# Patient Record
Sex: FEMALE | Race: OTHER | HISPANIC OR LATINO | URBAN - METROPOLITAN AREA
[De-identification: names, ages, dates, MRNs, and addresses within clinical notes are randomized per-mention and may not be internally consistent; named-entity substitution may affect disease eponyms.]

---

## 2022-04-08 ENCOUNTER — INPATIENT (INPATIENT)
Facility: HOSPITAL | Age: 77
LOS: 7 days | Discharge: HOME | DRG: 375 | End: 2022-04-16
Attending: INTERNAL MEDICINE | Admitting: INTERNAL MEDICINE
Payer: MEDICARE

## 2022-04-08 VITALS
SYSTOLIC BLOOD PRESSURE: 119 MMHG | WEIGHT: 164.02 LBS | DIASTOLIC BLOOD PRESSURE: 57 MMHG | OXYGEN SATURATION: 98 % | HEART RATE: 77 BPM | RESPIRATION RATE: 18 BRPM | TEMPERATURE: 98 F

## 2022-04-08 DIAGNOSIS — D64.9 ANEMIA, UNSPECIFIED: ICD-10-CM

## 2022-04-08 LAB
ALBUMIN SERPL ELPH-MCNC: 3.5 G/DL — SIGNIFICANT CHANGE UP (ref 3.5–5)
ALP SERPL-CCNC: 35 U/L — LOW (ref 40–120)
ALT FLD-CCNC: 31 U/L DA — SIGNIFICANT CHANGE UP (ref 10–60)
ANION GAP SERPL CALC-SCNC: 7 MMOL/L — SIGNIFICANT CHANGE UP (ref 5–17)
APTT BLD: 31 SEC — SIGNIFICANT CHANGE UP (ref 27.5–35.5)
AST SERPL-CCNC: 14 U/L — SIGNIFICANT CHANGE UP (ref 10–40)
BASOPHILS # BLD AUTO: 0.03 K/UL — SIGNIFICANT CHANGE UP (ref 0–0.2)
BASOPHILS NFR BLD AUTO: 0.5 % — SIGNIFICANT CHANGE UP (ref 0–2)
BILIRUB SERPL-MCNC: 0.2 MG/DL — SIGNIFICANT CHANGE UP (ref 0.2–1.2)
BUN SERPL-MCNC: 18 MG/DL — SIGNIFICANT CHANGE UP (ref 7–18)
CALCIUM SERPL-MCNC: 9 MG/DL — SIGNIFICANT CHANGE UP (ref 8.4–10.5)
CHLORIDE SERPL-SCNC: 103 MMOL/L — SIGNIFICANT CHANGE UP (ref 96–108)
CO2 SERPL-SCNC: 28 MMOL/L — SIGNIFICANT CHANGE UP (ref 22–31)
CREAT SERPL-MCNC: 1.53 MG/DL — HIGH (ref 0.5–1.3)
EGFR: 35 ML/MIN/1.73M2 — LOW
EOSINOPHIL # BLD AUTO: 0.06 K/UL — SIGNIFICANT CHANGE UP (ref 0–0.5)
EOSINOPHIL NFR BLD AUTO: 1 % — SIGNIFICANT CHANGE UP (ref 0–6)
GLUCOSE SERPL-MCNC: 296 MG/DL — HIGH (ref 70–99)
HCT VFR BLD CALC: 20.7 % — CRITICAL LOW (ref 34.5–45)
HGB BLD-MCNC: 6.2 G/DL — CRITICAL LOW (ref 11.5–15.5)
IMM GRANULOCYTES NFR BLD AUTO: 0.3 % — SIGNIFICANT CHANGE UP (ref 0–1.5)
INR BLD: 1.13 RATIO — SIGNIFICANT CHANGE UP (ref 0.88–1.16)
IRON SATN MFR SERPL: 108 UG/DL — SIGNIFICANT CHANGE UP (ref 40–150)
IRON SATN MFR SERPL: 32 % — SIGNIFICANT CHANGE UP (ref 15–50)
LACTATE SERPL-SCNC: 1.9 MMOL/L — SIGNIFICANT CHANGE UP (ref 0.7–2)
LIDOCAIN IGE QN: 289 U/L — SIGNIFICANT CHANGE UP (ref 73–393)
LYMPHOCYTES # BLD AUTO: 1.61 K/UL — SIGNIFICANT CHANGE UP (ref 1–3.3)
LYMPHOCYTES # BLD AUTO: 26 % — SIGNIFICANT CHANGE UP (ref 13–44)
MCHC RBC-ENTMCNC: 28.2 PG — SIGNIFICANT CHANGE UP (ref 27–34)
MCHC RBC-ENTMCNC: 30 GM/DL — LOW (ref 32–36)
MCV RBC AUTO: 94.1 FL — SIGNIFICANT CHANGE UP (ref 80–100)
MONOCYTES # BLD AUTO: 0.57 K/UL — SIGNIFICANT CHANGE UP (ref 0–0.9)
MONOCYTES NFR BLD AUTO: 9.2 % — SIGNIFICANT CHANGE UP (ref 2–14)
NEUTROPHILS # BLD AUTO: 3.91 K/UL — SIGNIFICANT CHANGE UP (ref 1.8–7.4)
NEUTROPHILS NFR BLD AUTO: 63 % — SIGNIFICANT CHANGE UP (ref 43–77)
NRBC # BLD: 0 /100 WBCS — SIGNIFICANT CHANGE UP (ref 0–0)
PLATELET # BLD AUTO: 330 K/UL — SIGNIFICANT CHANGE UP (ref 150–400)
POTASSIUM SERPL-MCNC: 4.5 MMOL/L — SIGNIFICANT CHANGE UP (ref 3.5–5.3)
POTASSIUM SERPL-SCNC: 4.5 MMOL/L — SIGNIFICANT CHANGE UP (ref 3.5–5.3)
PROT SERPL-MCNC: 6.9 G/DL — SIGNIFICANT CHANGE UP (ref 6–8.3)
PROTHROM AB SERPL-ACNC: 13.5 SEC — HIGH (ref 10.5–13.4)
RBC # BLD: 2.2 M/UL — LOW (ref 3.8–5.2)
RBC # FLD: 16.3 % — HIGH (ref 10.3–14.5)
SARS-COV-2 RNA SPEC QL NAA+PROBE: SIGNIFICANT CHANGE UP
SODIUM SERPL-SCNC: 138 MMOL/L — SIGNIFICANT CHANGE UP (ref 135–145)
TIBC SERPL-MCNC: 339 UG/DL — SIGNIFICANT CHANGE UP (ref 250–450)
UIBC SERPL-MCNC: 231 UG/DL — SIGNIFICANT CHANGE UP (ref 110–370)
WBC # BLD: 6.2 K/UL — SIGNIFICANT CHANGE UP (ref 3.8–10.5)
WBC # FLD AUTO: 6.2 K/UL — SIGNIFICANT CHANGE UP (ref 3.8–10.5)

## 2022-04-08 PROCEDURE — 99285 EMERGENCY DEPT VISIT HI MDM: CPT

## 2022-04-08 NOTE — ED ADULT NURSE NOTE - OBJECTIVE STATEMENT
Patient sent by PCP for abnormal labs. Patient reports weakness for weeks but unable to see doctor until today. NO signs active  bleeding noted.

## 2022-04-08 NOTE — ED PROVIDER NOTE - NS ED ROS FT
CONSTITUTIONAL: no fever  EYES: no eye pain   ENMT: no throat pain  CARDIOVASCULAR: no chest pain   RESPIRATORY: no shortness of breath   GASTROINTESTINAL: no abdominal pain   GENITOURINARY: no dysuria   SKIN: no rashes  NEUROLOGICAL: no headache, +weakness

## 2022-04-08 NOTE — ED PROVIDER NOTE - PHYSICAL EXAMINATION
GENERAL: well appearing, no acute distress   HEAD: atraumatic   EYES: EOMI   ENT: moist oral mucosa   CARDIAC: regular rate  RESPIRATORY: no increased work of breathing   GI: abdo soft nontender nondistended   MUSCULOSKELETAL: no deformity   NEUROLOGICAL: alert, spontaneous movement of extremities   SKIN: no visible rash  PSYCHIATRIC: cooperative

## 2022-04-08 NOTE — ED PROVIDER NOTE - OBJECTIVE STATEMENT
77 yo F pmh of thyroid disease, HTN, HLD, DM, presents from office of Dr Rollins stating Hgb in Dec 2021 11.4 and today is 6.8, requesting admission for EGD/colonsocpy. Pt has empty bottle of xarelto w/ her medications, but does not know the last time she took this or the indication for AC. c/o weakness. Denies bleeding, syncope, other acute complaints.  used.

## 2022-04-09 ENCOUNTER — TRANSCRIPTION ENCOUNTER (OUTPATIENT)
Age: 77
End: 2022-04-09

## 2022-04-09 DIAGNOSIS — I10 ESSENTIAL (PRIMARY) HYPERTENSION: ICD-10-CM

## 2022-04-09 DIAGNOSIS — I48.91 UNSPECIFIED ATRIAL FIBRILLATION: ICD-10-CM

## 2022-04-09 DIAGNOSIS — Z29.9 ENCOUNTER FOR PROPHYLACTIC MEASURES, UNSPECIFIED: ICD-10-CM

## 2022-04-09 DIAGNOSIS — I25.10 ATHEROSCLEROTIC HEART DISEASE OF NATIVE CORONARY ARTERY WITHOUT ANGINA PECTORIS: ICD-10-CM

## 2022-04-09 DIAGNOSIS — D64.9 ANEMIA, UNSPECIFIED: ICD-10-CM

## 2022-04-09 DIAGNOSIS — Z95.1 PRESENCE OF AORTOCORONARY BYPASS GRAFT: Chronic | ICD-10-CM

## 2022-04-09 DIAGNOSIS — E03.9 HYPOTHYROIDISM, UNSPECIFIED: ICD-10-CM

## 2022-04-09 DIAGNOSIS — E11.9 TYPE 2 DIABETES MELLITUS WITHOUT COMPLICATIONS: ICD-10-CM

## 2022-04-09 DIAGNOSIS — Z95.0 PRESENCE OF CARDIAC PACEMAKER: Chronic | ICD-10-CM

## 2022-04-09 DIAGNOSIS — E78.5 HYPERLIPIDEMIA, UNSPECIFIED: ICD-10-CM

## 2022-04-09 LAB
A1C WITH ESTIMATED AVERAGE GLUCOSE RESULT: 7.5 % — HIGH (ref 4–5.6)
ALBUMIN SERPL ELPH-MCNC: 3.2 G/DL — LOW (ref 3.5–5)
ALP SERPL-CCNC: 27 U/L — LOW (ref 40–120)
ALT FLD-CCNC: 30 U/L DA — SIGNIFICANT CHANGE UP (ref 10–60)
ANION GAP SERPL CALC-SCNC: 8 MMOL/L — SIGNIFICANT CHANGE UP (ref 5–17)
AST SERPL-CCNC: 17 U/L — SIGNIFICANT CHANGE UP (ref 10–40)
BASOPHILS # BLD AUTO: 0.04 K/UL — SIGNIFICANT CHANGE UP (ref 0–0.2)
BASOPHILS NFR BLD AUTO: 0.9 % — SIGNIFICANT CHANGE UP (ref 0–2)
BILIRUB SERPL-MCNC: 0.2 MG/DL — SIGNIFICANT CHANGE UP (ref 0.2–1.2)
BUN SERPL-MCNC: 16 MG/DL — SIGNIFICANT CHANGE UP (ref 7–18)
CALCIUM SERPL-MCNC: 9 MG/DL — SIGNIFICANT CHANGE UP (ref 8.4–10.5)
CHLORIDE SERPL-SCNC: 106 MMOL/L — SIGNIFICANT CHANGE UP (ref 96–108)
CHOLEST SERPL-MCNC: 108 MG/DL — SIGNIFICANT CHANGE UP
CO2 SERPL-SCNC: 27 MMOL/L — SIGNIFICANT CHANGE UP (ref 22–31)
CREAT SERPL-MCNC: 1.14 MG/DL — SIGNIFICANT CHANGE UP (ref 0.5–1.3)
DIR ANTIGLOB POLYSPECIFIC INTERPRETATION: SIGNIFICANT CHANGE UP
EGFR: 50 ML/MIN/1.73M2 — LOW
EOSINOPHIL # BLD AUTO: 0.12 K/UL — SIGNIFICANT CHANGE UP (ref 0–0.5)
EOSINOPHIL NFR BLD AUTO: 2.7 % — SIGNIFICANT CHANGE UP (ref 0–6)
ERYTHROCYTE [SEDIMENTATION RATE] IN BLOOD: 42 MM/HR — HIGH (ref 0–20)
ESTIMATED AVERAGE GLUCOSE: 169 MG/DL — HIGH (ref 68–114)
FERRITIN SERPL-MCNC: 27 NG/ML — SIGNIFICANT CHANGE UP (ref 15–150)
GLUCOSE BLDC GLUCOMTR-MCNC: 144 MG/DL — HIGH (ref 70–99)
GLUCOSE BLDC GLUCOMTR-MCNC: 259 MG/DL — HIGH (ref 70–99)
GLUCOSE BLDC GLUCOMTR-MCNC: 293 MG/DL — HIGH (ref 70–99)
GLUCOSE BLDC GLUCOMTR-MCNC: 337 MG/DL — HIGH (ref 70–99)
GLUCOSE SERPL-MCNC: 123 MG/DL — HIGH (ref 70–99)
HAPTOGLOB SERPL-MCNC: 162 MG/DL — SIGNIFICANT CHANGE UP (ref 34–200)
HCT VFR BLD CALC: 23.3 % — LOW (ref 34.5–45)
HCT VFR BLD CALC: 25.3 % — LOW (ref 34.5–45)
HCV AB S/CO SERPL IA: 0.1 S/CO — SIGNIFICANT CHANGE UP (ref 0–0.99)
HCV AB SERPL-IMP: SIGNIFICANT CHANGE UP
HDLC SERPL-MCNC: 36 MG/DL — LOW
HGB BLD-MCNC: 7.2 G/DL — LOW (ref 11.5–15.5)
HGB BLD-MCNC: 7.5 G/DL — LOW (ref 11.5–15.5)
IMM GRANULOCYTES NFR BLD AUTO: 0.2 % — SIGNIFICANT CHANGE UP (ref 0–1.5)
LDH SERPL L TO P-CCNC: 194 U/L — SIGNIFICANT CHANGE UP (ref 120–225)
LIPID PNL WITH DIRECT LDL SERPL: 48 MG/DL — SIGNIFICANT CHANGE UP
LYMPHOCYTES # BLD AUTO: 1.22 K/UL — SIGNIFICANT CHANGE UP (ref 1–3.3)
LYMPHOCYTES # BLD AUTO: 27.1 % — SIGNIFICANT CHANGE UP (ref 13–44)
MAGNESIUM SERPL-MCNC: 1.8 MG/DL — SIGNIFICANT CHANGE UP (ref 1.6–2.6)
MCHC RBC-ENTMCNC: 26.7 PG — LOW (ref 27–34)
MCHC RBC-ENTMCNC: 27.5 PG — SIGNIFICANT CHANGE UP (ref 27–34)
MCHC RBC-ENTMCNC: 29.6 GM/DL — LOW (ref 32–36)
MCHC RBC-ENTMCNC: 30.9 GM/DL — LOW (ref 32–36)
MCV RBC AUTO: 88.9 FL — SIGNIFICANT CHANGE UP (ref 80–100)
MCV RBC AUTO: 90 FL — SIGNIFICANT CHANGE UP (ref 80–100)
MONOCYTES # BLD AUTO: 0.55 K/UL — SIGNIFICANT CHANGE UP (ref 0–0.9)
MONOCYTES NFR BLD AUTO: 12.2 % — SIGNIFICANT CHANGE UP (ref 2–14)
NEUTROPHILS # BLD AUTO: 2.56 K/UL — SIGNIFICANT CHANGE UP (ref 1.8–7.4)
NEUTROPHILS NFR BLD AUTO: 56.9 % — SIGNIFICANT CHANGE UP (ref 43–77)
NON HDL CHOLESTEROL: 72 MG/DL — SIGNIFICANT CHANGE UP
NRBC # BLD: 0 /100 WBCS — SIGNIFICANT CHANGE UP (ref 0–0)
NRBC # BLD: 0 /100 WBCS — SIGNIFICANT CHANGE UP (ref 0–0)
PHOSPHATE SERPL-MCNC: 5.5 MG/DL — HIGH (ref 2.5–4.5)
PLATELET # BLD AUTO: 267 K/UL — SIGNIFICANT CHANGE UP (ref 150–400)
PLATELET # BLD AUTO: 308 K/UL — SIGNIFICANT CHANGE UP (ref 150–400)
POTASSIUM SERPL-MCNC: 4 MMOL/L — SIGNIFICANT CHANGE UP (ref 3.5–5.3)
POTASSIUM SERPL-SCNC: 4 MMOL/L — SIGNIFICANT CHANGE UP (ref 3.5–5.3)
PROT SERPL-MCNC: 6.3 G/DL — SIGNIFICANT CHANGE UP (ref 6–8.3)
RBC # BLD: 2.62 M/UL — LOW (ref 3.8–5.2)
RBC # BLD: 2.63 M/UL — LOW (ref 3.8–5.2)
RBC # BLD: 2.81 M/UL — LOW (ref 3.8–5.2)
RBC # FLD: 18.1 % — HIGH (ref 10.3–14.5)
RBC # FLD: 18.9 % — HIGH (ref 10.3–14.5)
RETICS #: 114.1 K/UL — SIGNIFICANT CHANGE UP (ref 25–125)
RETICS/RBC NFR: 4.3 % — HIGH (ref 0.5–2.5)
SODIUM SERPL-SCNC: 141 MMOL/L — SIGNIFICANT CHANGE UP (ref 135–145)
TRIGL SERPL-MCNC: 118 MG/DL — SIGNIFICANT CHANGE UP
TSH SERPL-MCNC: 3.05 UU/ML — SIGNIFICANT CHANGE UP (ref 0.34–4.82)
WBC # BLD: 4.5 K/UL — SIGNIFICANT CHANGE UP (ref 3.8–10.5)
WBC # BLD: 5.43 K/UL — SIGNIFICANT CHANGE UP (ref 3.8–10.5)
WBC # FLD AUTO: 4.5 K/UL — SIGNIFICANT CHANGE UP (ref 3.8–10.5)
WBC # FLD AUTO: 5.43 K/UL — SIGNIFICANT CHANGE UP (ref 3.8–10.5)

## 2022-04-09 RX ORDER — METOPROLOL TARTRATE 50 MG
50 TABLET ORAL
Refills: 0 | Status: DISCONTINUED | OUTPATIENT
Start: 2022-04-09 | End: 2022-04-16

## 2022-04-09 RX ORDER — GABAPENTIN 400 MG/1
100 CAPSULE ORAL THREE TIMES A DAY
Refills: 0 | Status: DISCONTINUED | OUTPATIENT
Start: 2022-04-09 | End: 2022-04-16

## 2022-04-09 RX ORDER — LEVOTHYROXINE SODIUM 125 MCG
0 TABLET ORAL
Qty: 0 | Refills: 0 | DISCHARGE

## 2022-04-09 RX ORDER — INSULIN LISPRO 100/ML
VIAL (ML) SUBCUTANEOUS
Refills: 0 | Status: DISCONTINUED | OUTPATIENT
Start: 2022-04-09 | End: 2022-04-16

## 2022-04-09 RX ORDER — LEVOTHYROXINE SODIUM 125 MCG
75 TABLET ORAL DAILY
Refills: 0 | Status: DISCONTINUED | OUTPATIENT
Start: 2022-04-09 | End: 2022-04-16

## 2022-04-09 RX ORDER — FENOFIBRATE,MICRONIZED 130 MG
0 CAPSULE ORAL
Qty: 0 | Refills: 0 | DISCHARGE

## 2022-04-09 RX ORDER — SITAGLIPTIN 50 MG/1
0 TABLET, FILM COATED ORAL
Qty: 0 | Refills: 0 | DISCHARGE

## 2022-04-09 RX ORDER — PANTOPRAZOLE SODIUM 20 MG/1
40 TABLET, DELAYED RELEASE ORAL DAILY
Refills: 0 | Status: DISCONTINUED | OUTPATIENT
Start: 2022-04-09 | End: 2022-04-16

## 2022-04-09 RX ORDER — FENOFIBRATE,MICRONIZED 130 MG
145 CAPSULE ORAL DAILY
Refills: 0 | Status: DISCONTINUED | OUTPATIENT
Start: 2022-04-09 | End: 2022-04-16

## 2022-04-09 RX ORDER — HYDRALAZINE HCL 50 MG
0 TABLET ORAL
Qty: 0 | Refills: 0 | DISCHARGE

## 2022-04-09 RX ORDER — GABAPENTIN 400 MG/1
0 CAPSULE ORAL
Qty: 0 | Refills: 0 | DISCHARGE

## 2022-04-09 RX ORDER — HYDRALAZINE HCL 50 MG
25 TABLET ORAL DAILY
Refills: 0 | Status: DISCONTINUED | OUTPATIENT
Start: 2022-04-09 | End: 2022-04-11

## 2022-04-09 RX ORDER — LOSARTAN POTASSIUM 100 MG/1
0 TABLET, FILM COATED ORAL
Qty: 0 | Refills: 0 | DISCHARGE

## 2022-04-09 RX ORDER — RIVAROXABAN 15 MG-20MG
0 KIT ORAL
Qty: 0 | Refills: 0 | DISCHARGE

## 2022-04-09 RX ORDER — ALENDRONATE SODIUM 70 MG/1
0 TABLET ORAL
Qty: 0 | Refills: 0 | DISCHARGE

## 2022-04-09 RX ORDER — ROSUVASTATIN CALCIUM 5 MG/1
0 TABLET ORAL
Qty: 0 | Refills: 0 | DISCHARGE

## 2022-04-09 RX ORDER — ATORVASTATIN CALCIUM 80 MG/1
40 TABLET, FILM COATED ORAL AT BEDTIME
Refills: 0 | Status: DISCONTINUED | OUTPATIENT
Start: 2022-04-09 | End: 2022-04-16

## 2022-04-09 RX ORDER — METOPROLOL TARTRATE 50 MG
0 TABLET ORAL
Qty: 0 | Refills: 0 | DISCHARGE

## 2022-04-09 RX ORDER — INSULIN LISPRO 100/ML
VIAL (ML) SUBCUTANEOUS AT BEDTIME
Refills: 0 | Status: DISCONTINUED | OUTPATIENT
Start: 2022-04-09 | End: 2022-04-16

## 2022-04-09 RX ADMIN — GABAPENTIN 100 MILLIGRAM(S): 400 CAPSULE ORAL at 13:58

## 2022-04-09 RX ADMIN — Medication 75 MICROGRAM(S): at 05:16

## 2022-04-09 RX ADMIN — Medication 4: at 16:26

## 2022-04-09 RX ADMIN — Medication 50 MILLIGRAM(S): at 05:16

## 2022-04-09 RX ADMIN — Medication 3: at 12:25

## 2022-04-09 RX ADMIN — ATORVASTATIN CALCIUM 40 MILLIGRAM(S): 80 TABLET, FILM COATED ORAL at 22:11

## 2022-04-09 RX ADMIN — GABAPENTIN 100 MILLIGRAM(S): 400 CAPSULE ORAL at 05:16

## 2022-04-09 RX ADMIN — Medication 25 MILLIGRAM(S): at 05:16

## 2022-04-09 RX ADMIN — GABAPENTIN 100 MILLIGRAM(S): 400 CAPSULE ORAL at 22:09

## 2022-04-09 RX ADMIN — PANTOPRAZOLE SODIUM 40 MILLIGRAM(S): 20 TABLET, DELAYED RELEASE ORAL at 13:57

## 2022-04-09 RX ADMIN — Medication 145 MILLIGRAM(S): at 13:58

## 2022-04-09 RX ADMIN — Medication 50 MILLIGRAM(S): at 18:20

## 2022-04-09 RX ADMIN — Medication 1: at 22:08

## 2022-04-09 NOTE — DISCHARGE NOTE PROVIDER - NSDCCPCAREPLAN_GEN_ALL_CORE_FT
PRINCIPAL DISCHARGE DIAGNOSIS  Diagnosis: Mass of hepatic flexure of colon  Assessment and Plan of Treatment: You presented to the hospital with anemia, you were followed by a gastroenterologist and had an endoscopy and colonoscopy on 4/14 which found a mass on your colon. A biopsy was taken and sent for pathology, you must follow up with your oncologist for the results. Christofer consulted and reccomend a colon rescection to remove the mass. Follow up with Dr Dee in 2 weeks      SECONDARY DISCHARGE DIAGNOSES  Diagnosis: Symptomatic anemia  Assessment and Plan of Treatment: You were found to have anemia, and required 1 blood transfusion. This is likely caused by the mass that was found in your colon. Follow up with your primary care doctor to have your blood work redrawn. Symptoms to report, bleeding, palpitations, fatigue, pale skin, cold skin, dizziness. Take medications as ordered by PCP      Diagnosis: Atrial fibrillation  Assessment and Plan of Treatment: Atrial fibrillation is the most common heart rhythm problem.  The condition puts you at risk for has stroke and heart attack  It helps if you control your blood pressure, not drink more than 1-2 alcohol drinks per day, cut down on caffeine, getting treatment for over active thyroid gland, and get regular exercise  Call your doctor if you feel your heart racing or beating unusually, chest tightness or pain, lightheaded, faint, shortness of breath especially with exercise  It is important to take your heart medication as prescribed  You may be on anticoagulation which is very important to take as directed - you may need blood work to monitor drug levels      Diagnosis: Diabetes mellitus  Assessment and Plan of Treatment: Your HgA1C was 7.5 this admission.  Make sure you get your HgA1c checked every three months.  If you take oral diabetes medications, check your blood glucose two times a day.  If you take insulin, check your blood glucose before meals and at bedtime.  It's important not to skip any meals.  Keep a log of your blood glucose results and always take it with you to your doctor appointments.  Keep a list of your current medications including injectables and over the counter medications and bring this medication list with you to all your doctor appointments.  If you have not seen your ophthalmologist this year call for appointment.  Check your feet daily for redness, sores, or openings. Do not self treat. If no improvement in two days call your primary care physician for an appointment.  Low blood sugar (hypoglycemia) is a blood sugar below 70mg/dl. Check your blood sugar if you feel signs/symptoms of hypoglycemia. If your blood sugar is below 70 take 15 grams of carbohydrates (ex 4 oz of apple juice, 3-4 glucose tablets, or 4-6 oz of regular soda) wait 15 minutes and repeat blood sugar to make sure it comes up above 70.  If your blood sugar is above 70 and you are due for a meal, have a meal.  If you are not due for a meal have a snack.  This snack helps keeps your blood sugar at a safe range.

## 2022-04-09 NOTE — H&P ADULT - NSHPPHYSICALEXAM_GEN_ALL_CORE
Vital Signs Last 24 Hrs  T(C): 36.6 (08 Apr 2022 23:48), Max: 37.1 (08 Apr 2022 21:00)  T(F): 97.9 (08 Apr 2022 23:48), Max: 98.7 (08 Apr 2022 21:00)  HR: 74 (08 Apr 2022 23:48) (74 - 81)  BP: 173/72 (08 Apr 2022 23:48) (119/57 - 173/72)  BP(mean): 105 (08 Apr 2022 23:48) (105 - 105)  RR: 17 (08 Apr 2022 23:48) (17 - 18)  SpO2: 96% (08 Apr 2022 23:48) (96% - 98%)    GENERAL: NAD, lying in bed comfortably  HEAD:  Atraumatic, Normocephalic  EYES: EOMI, PERRLA, conjunctiva and sclera clear  ENT: Moist mucous membranes  NECK: Supple, No JVD  CHEST/LUNG: Clear to auscultation bilaterally; No rales, rhonchi, wheezing, or rubs. Unlabored respirations  HEART: Regular rate and rhythm; No murmurs, rubs, or gallops  ABDOMEN: Bowel sounds present; Soft, Nontender, Nondistended. No hepatomegaly  EXTREMITIES:  2+ Peripheral Pulses, brisk capillary refill. No clubbing, cyanosis, or edema  NERVOUS SYSTEM:  Alert & Oriented X3, speech clear. No deficits   MSK: FROM all 4 extremities, full and equal strength  SKIN: No rashes or lesions

## 2022-04-09 NOTE — DISCHARGE NOTE PROVIDER - NSDCMRMEDTOKEN_GEN_ALL_CORE_FT
ALENDRONATE SODIUM 70MG TAB: tab(s) orally once a week  RAH LEVOTHYROXINE SODIUM 75MCG TAB: tab(s) orally once a day  FENOFIBRATE 160MG TAB: tab(s) orally once a day  GABAPENTIN 100MG CAP: cap(s) orally 3 times a day  HYDRALAZINE HYDROCHLORIDE 25MG TAB: tab(s) orally once a day  JANUVIA 100MG TAB: tab(s) orally once a day  LEVEMIR FLEXTOUCH 100U/ML PEN:   LOSARTAN POTASSIUM 50MG TAB: tab(s) orally once a day  METFORMIN HYDROCHLORIDE 1000MG TAB:   METOPROLOL SUCCINATE ER 100MG ER TAB: tab(s) orally once a day  PANTOPRAZOLE SODIUM 40MG DR TAB:   ROSUVASTATIN CALCIUM 10MG TAB: tab(s) orally once a day (at bedtime)  XARELTO 20MG TAB: tab(s) orally once a day   ALENDRONATE SODIUM 70MG TAB: tab(s) orally once a week  RAH LEVOTHYROXINE SODIUM 75MCG TAB: tab(s) orally once a day  FENOFIBRATE 160MG TAB: tab(s) orally once a day  GABAPENTIN 100MG CAP: cap(s) orally 3 times a day  HYDRALAZINE HYDROCHLORIDE 25MG TAB: tab(s) orally once a day  JANUVIA 100MG TAB: tab(s) orally once a day  LEVEMIR FLEXTOUCH 100U/ML PEN:   LOSARTAN POTASSIUM 50MG TAB: tab(s) orally once a day  METFORMIN HYDROCHLORIDE 1000MG TAB:   METOPROLOL SUCCINATE ER 100MG ER TAB: tab(s) orally once a day  PANTOPRAZOLE SODIUM 40MG DR TAB: 1 tab(s) orally once a day  ROSUVASTATIN CALCIUM 10MG TAB: tab(s) orally once a day (at bedtime)  XARELTO 20MG TAB: tab(s) orally once a day

## 2022-04-09 NOTE — H&P ADULT - ASSESSMENT
Pt is a 77 yo F from home, walks with a walker, with PMH of CAD (s/p CABG in 2016), Atrial Fibrillation (s/p PPM, on xarelto), Hypothyroidism HTN, HLD, DM, presents to the ED with complaints of fatiigue, weakness and SOB on exertion for 2 weeks. Admitted for symptomatic anemia requiring blood transfusion

## 2022-04-09 NOTE — DISCHARGE NOTE PROVIDER - HOSPITAL COURSE
Pt is a 77 yo F from home, walks with a walker, with PMH of CAD (s/p CABG in 2016), Atrial Fibrillation (s/p PPM, on xarelto), Hypothyroidism HTN, HLD, DM, presents to the ED with complaints of fatiigue, weakness and SOB on exertion for 2 weeks. H/H upon admission was 6.2/20.7. Admitted for symptomatic anemia requiring blood transfusion. Was transfused with 1 Unit of RPBC and post tarnsfusion H/H was 7.5/25.3.      >>>>>>>>>>>>>>>>>>>>>>INCOMPLETE>>>>>>>>>>>>>>>>>>>>>>>>   75 y/o female, from home, walks with a walker, with PMH of CAD (s/p CABG in 2016), Atrial Fibrillation (s/p PPM, on xarelto), Hypothyroidism HTN, HLD, DM, breast cancer s/p mastectomy   (12 years ago, not on chemo) presents to the ED with complaints of fatigue weakness and SOB on exertion for 2 weeks. Admitted to medicine for symptomatic anemia requiring 1U PRBCs.   Heme onc and GI Dr. Tapia following. Pt initially required cardiac workup for anesthesia during EGD and colonoscopy, ekg nsr and troponin negative x2. Colonoscopy revealed mass in hepatic flexure,   surgery Dr. Dee consulted. Pending pathology results from mass, will follow up with heme onc outpatient for results. CT chest and A/P showed no metastasis.   Surgery consulted and reccs outpatient colon resection, patient and family in agreement   Hospital course complicated by hyperglycemia for which dominic ray followed and insulin was adjusted   Discharge discussed with attending, this is only a brief summary of patient's hospital stay, for full course please see EMR

## 2022-04-09 NOTE — PATIENT PROFILE ADULT - FALL HARM RISK - FACTORS
Impaired gait/Impaired vision/IV and/or equipment tethered to patient/Medication side effects/Other medical problems/Poor balance/Weakness/Other

## 2022-04-09 NOTE — H&P ADULT - PROBLEM SELECTOR PLAN 1
Pt presented with fatigue, weakness and SOB on exertion for last two weeks  No h/o lidia, hematochezia, hematuria or any other sources of bleed  Was on xarelto for Afib  Vitals stable  Physical exam unremarkable  Hemoglobin was 6.2 on admission  received 1U PRBC  Repeat after transfusion, hemoglobin is 7.5  follow up FOBT  monitor CBC  GI Dr, *** consulted Pt presented with fatigue, weakness and SOB on exertion for last two weeks  No h/o lidia, hematochezia, hematuria or any other sources of bleed  Was on xarelto for Afib  Vitals stable  Physical exam unremarkable  Hemoglobin was 6.2 on admission  received 1U PRBC  Repeat after transfusion, hemoglobin is 7.5  follow up FOBT  monitor CBC AM  GI Dr, *** consulted Pt presented with fatigue, weakness and SOB on exertion for last two weeks  No h/o lidia, hematochezia, hematuria or any other sources of bleed  Was on xarelto for Afib  Vitals stable  Physical exam unremarkable  Hemoglobin was 6.2 on admission  received 1U PRBC  Repeat after transfusion, hemoglobin is 7.5  follow up FOBT  follow up LDH, fibrinogen, ESR, Direct Parmjit  monitor CBC AM  Heme/Onc Dr. Liu consulted Pt presented with fatigue, weakness and SOB on exertion for last two weeks  No h/o lidia, hematochezia, hematuria or any other sources of bleed  Was on xarelto for Afib  Vitals stable  Physical exam unremarkable  Hemoglobin was 6.2 on admission  received 1U PRBC  Repeat after transfusion, hemoglobin is 7.5  follow up FOBT  follow up LDH, fibrinogen, haptoglobin, ESR, Direct Parmjit  monitor CBC AM  transfuse if hemoglobin <7g/dl  Heme/Onc Dr. Liu consulted

## 2022-04-09 NOTE — DISCHARGE NOTE PROVIDER - PROVIDER TOKENS
PROVIDER:[TOKEN:[[id number]],FOLLOWUP:[2 weeks]],PROVIDER:[TOKEN:[[id number]],FOLLOWUP:[1 week]]

## 2022-04-09 NOTE — H&P ADULT - PROBLEM SELECTOR PLAN 2
Pt has history of atrial fibrillation   at home on xarelto   HELD Xarelto for severe anemia  started on lopressor 100mg BID with parameters

## 2022-04-09 NOTE — H&P ADULT - PROBLEM SELECTOR PLAN 4
Pt has h/o type 2 DM  at home on levemir, metformin and januvia  started on insulin HSS  follow up A1c

## 2022-04-09 NOTE — H&P ADULT - HISTORY OF PRESENT ILLNESS
Pt is a 75 yo F from home, walks with a walker, with PMH of CAD (s/p CABG in 2016), Atrial Fibrillation (s/p PPM, on xarelto), Hypothyroidism HTN, HLD, DM, presents to the ED with complaints of fatiigue, weakness and SOB on exertion for 2 weeks.     Patient was sent from office of Dr Rollins stating Hgb in Dec 2021 11.4 and today is 6.8. No c/o chest pain, fever, headache, N/V, abdominal pain, urinary complaints. No recent travel/sickness/ change in meds.   Pt is a 75 yo F from home, walks with a walker, with PMH of CAD (s/p CABG in 2016), Atrial Fibrillation (s/p PPM, on xarelto), Hypothyroidism HTN, HLD, DM, presents to the ED with complaints of fatiigue, weakness and SOB on exertion for 2 weeks. Pt said she has been feeling fatigue with her usual routine and generalized weakness wherein she is not moving around as much as before. This week she started having SOB on exertion whenever she walked couple of blocks. Today she went to her PCP Dr. Khan's office and her hemoglobin was around 5 there according to patient and was sent here. No c/o chest pain, fever, headache, N/V, abdominal pain, urinary complaints. No recent travel/sickness/ change in meds.   Pt is a 77 yo F from home, walks with a walker, with PMH of CAD (s/p CABG in 2016), Atrial Fibrillation (s/p PPM, on xarelto), Hypothyroidism HTN, HLD, DM, presents to the ED with complaints of fatiigue, weakness and SOB on exertion for 2 weeks. Pt said she has been feeling fatigue with her usual routine and generalized weakness wherein she is not moving around as much as before. This week she started having SOB on exertion whenever she walked couple of blocks. Today she went to her PCP Dr. Christopher's office and her hemoglobin was around 5 there according to patient and was sent here. No c/o chest pain, fever, headache, N/V, abdominal pain, urinary complaints. No recent travel/sickness/ change in meds.   Pt is a 75 yo F from home, walks with a walker, with PMH of CAD (s/p CABG in 2016), Atrial Fibrillation (s/p PPM, on xarelto), Hypothyroidism HTN, HLD, DM, presents to the ED with complaints of fatiigue, weakness and SOB on exertion for 2 weeks. Pt said she has been feeling fatigue with her usual routine and generalized weakness wherein she is not moving around as much as before. This week she started having SOB on exertion whenever she walked couple of blocks. Today she went to her Hematologist Dr. Christopher's office and her hemoglobin was around 5 (baseline [id number]) according to patient and was sent here. No c/o chest pain, fever, headache, N/V, abdominal pain, urinary complaints. No recent travel/sickness/ change in meds.

## 2022-04-09 NOTE — DISCHARGE NOTE PROVIDER - CARE PROVIDER_API CALL
Catrachito Zabala)  Surgery  [address] [facility], Division [place] [address]  Phone: [phone]  Fax: [phone]  Follow Up Time: 2 weeks    Terry Sandhu)  Internal Medicine  [address] [place] [address]  Phone: [phone]  Fax: [phone]  Follow Up Time: 1 week

## 2022-04-09 NOTE — H&P ADULT - NSICDXPASTMEDICALHX_GEN_ALL_CORE_FT
PAST MEDICAL HISTORY:  Atrial fibrillation and flutter     CAD (coronary artery disease)     DM (diabetes mellitus)     Hypertension     Hypothyroidism

## 2022-04-09 NOTE — PATIENT PROFILE ADULT - FALL HARM RISK - HARM RISK INTERVENTIONS
Assistance with ambulation/Assistance OOB with selected safe patient handling equipment/Communicate Risk of Fall with Harm to all staff/Discuss with provider need for PT consult/Monitor gait and stability/Provide patient with walking aids - walker, cane, crutches/Reinforce activity limits and safety measures with patient and family/Review medications for side effects contributing to fall risk/Sit up slowly, dangle for a short time, stand at bedside before walking/Tailored Fall Risk Interventions/Toileting schedule using arm’s reach rule for commode and bathroom/Visual Cue: Yellow wristband and red socks/Bed in lowest position, wheels locked, appropriate side rails in place/Call bell, personal items and telephone in reach/Instruct patient to call for assistance before getting out of bed or chair/Non-slip footwear when patient is out of bed/[place] to call system/Physically safe environment - no spills, clutter or unnecessary equipment/Purposeful Proactive Rounding/Room/bathroom lighting operational, light cord in reach

## 2022-04-10 LAB
ALBUMIN SERPL ELPH-MCNC: 3.6 G/DL — SIGNIFICANT CHANGE UP (ref 3.5–5)
ALP SERPL-CCNC: 30 U/L — LOW (ref 40–120)
ALT FLD-CCNC: 38 U/L DA — SIGNIFICANT CHANGE UP (ref 10–60)
ANION GAP SERPL CALC-SCNC: 5 MMOL/L — SIGNIFICANT CHANGE UP (ref 5–17)
AST SERPL-CCNC: 20 U/L — SIGNIFICANT CHANGE UP (ref 10–40)
BASOPHILS # BLD AUTO: 0.04 K/UL — SIGNIFICANT CHANGE UP (ref 0–0.2)
BASOPHILS NFR BLD AUTO: 0.9 % — SIGNIFICANT CHANGE UP (ref 0–2)
BILIRUB SERPL-MCNC: 0.3 MG/DL — SIGNIFICANT CHANGE UP (ref 0.2–1.2)
BUN SERPL-MCNC: 13 MG/DL — SIGNIFICANT CHANGE UP (ref 7–18)
CALCIUM SERPL-MCNC: 8.8 MG/DL — SIGNIFICANT CHANGE UP (ref 8.4–10.5)
CHLORIDE SERPL-SCNC: 104 MMOL/L — SIGNIFICANT CHANGE UP (ref 96–108)
CO2 SERPL-SCNC: 29 MMOL/L — SIGNIFICANT CHANGE UP (ref 22–31)
CREAT SERPL-MCNC: 1.18 MG/DL — SIGNIFICANT CHANGE UP (ref 0.5–1.3)
EGFR: 48 ML/MIN/1.73M2 — LOW
EOSINOPHIL # BLD AUTO: 0.12 K/UL — SIGNIFICANT CHANGE UP (ref 0–0.5)
EOSINOPHIL NFR BLD AUTO: 2.6 % — SIGNIFICANT CHANGE UP (ref 0–6)
GLUCOSE BLDC GLUCOMTR-MCNC: 202 MG/DL — HIGH (ref 70–99)
GLUCOSE BLDC GLUCOMTR-MCNC: 276 MG/DL — HIGH (ref 70–99)
GLUCOSE BLDC GLUCOMTR-MCNC: 297 MG/DL — HIGH (ref 70–99)
GLUCOSE BLDC GLUCOMTR-MCNC: 330 MG/DL — HIGH (ref 70–99)
GLUCOSE SERPL-MCNC: 254 MG/DL — HIGH (ref 70–99)
HCT VFR BLD CALC: 25.6 % — LOW (ref 34.5–45)
HGB BLD-MCNC: 7.8 G/DL — LOW (ref 11.5–15.5)
IMM GRANULOCYTES NFR BLD AUTO: 0.4 % — SIGNIFICANT CHANGE UP (ref 0–1.5)
LYMPHOCYTES # BLD AUTO: 1.37 K/UL — SIGNIFICANT CHANGE UP (ref 1–3.3)
LYMPHOCYTES # BLD AUTO: 29.8 % — SIGNIFICANT CHANGE UP (ref 13–44)
MAGNESIUM SERPL-MCNC: 1.9 MG/DL — SIGNIFICANT CHANGE UP (ref 1.6–2.6)
MCHC RBC-ENTMCNC: 27.2 PG — SIGNIFICANT CHANGE UP (ref 27–34)
MCHC RBC-ENTMCNC: 30.5 GM/DL — LOW (ref 32–36)
MCV RBC AUTO: 89.2 FL — SIGNIFICANT CHANGE UP (ref 80–100)
MONOCYTES # BLD AUTO: 0.44 K/UL — SIGNIFICANT CHANGE UP (ref 0–0.9)
MONOCYTES NFR BLD AUTO: 9.6 % — SIGNIFICANT CHANGE UP (ref 2–14)
NEUTROPHILS # BLD AUTO: 2.6 K/UL — SIGNIFICANT CHANGE UP (ref 1.8–7.4)
NEUTROPHILS NFR BLD AUTO: 56.7 % — SIGNIFICANT CHANGE UP (ref 43–77)
NRBC # BLD: 0 /100 WBCS — SIGNIFICANT CHANGE UP (ref 0–0)
PHOSPHATE SERPL-MCNC: 4.7 MG/DL — HIGH (ref 2.5–4.5)
PLATELET # BLD AUTO: 278 K/UL — SIGNIFICANT CHANGE UP (ref 150–400)
POTASSIUM SERPL-MCNC: 4.9 MMOL/L — SIGNIFICANT CHANGE UP (ref 3.5–5.3)
POTASSIUM SERPL-SCNC: 4.9 MMOL/L — SIGNIFICANT CHANGE UP (ref 3.5–5.3)
PROT SERPL-MCNC: 7 G/DL — SIGNIFICANT CHANGE UP (ref 6–8.3)
RBC # BLD: 2.87 M/UL — LOW (ref 3.8–5.2)
RBC # FLD: 19 % — HIGH (ref 10.3–14.5)
SODIUM SERPL-SCNC: 138 MMOL/L — SIGNIFICANT CHANGE UP (ref 135–145)
WBC # BLD: 4.59 K/UL — SIGNIFICANT CHANGE UP (ref 3.8–10.5)
WBC # FLD AUTO: 4.59 K/UL — SIGNIFICANT CHANGE UP (ref 3.8–10.5)

## 2022-04-10 RX ORDER — BENZOCAINE AND MENTHOL 5; 1 G/100ML; G/100ML
1 LIQUID ORAL THREE TIMES A DAY
Refills: 0 | Status: DISCONTINUED | OUTPATIENT
Start: 2022-04-10 | End: 2022-04-16

## 2022-04-10 RX ADMIN — GABAPENTIN 100 MILLIGRAM(S): 400 CAPSULE ORAL at 22:19

## 2022-04-10 RX ADMIN — Medication 3: at 07:47

## 2022-04-10 RX ADMIN — Medication 75 MICROGRAM(S): at 06:19

## 2022-04-10 RX ADMIN — GABAPENTIN 100 MILLIGRAM(S): 400 CAPSULE ORAL at 14:29

## 2022-04-10 RX ADMIN — Medication 3: at 11:58

## 2022-04-10 RX ADMIN — Medication 25 MILLIGRAM(S): at 06:20

## 2022-04-10 RX ADMIN — Medication 4: at 16:54

## 2022-04-10 RX ADMIN — Medication 50 MILLIGRAM(S): at 06:20

## 2022-04-10 RX ADMIN — PANTOPRAZOLE SODIUM 40 MILLIGRAM(S): 20 TABLET, DELAYED RELEASE ORAL at 11:58

## 2022-04-10 RX ADMIN — BENZOCAINE AND MENTHOL 1 LOZENGE: 5; 1 LIQUID ORAL at 14:30

## 2022-04-10 RX ADMIN — Medication 50 MILLIGRAM(S): at 17:31

## 2022-04-10 RX ADMIN — Medication 145 MILLIGRAM(S): at 11:59

## 2022-04-10 RX ADMIN — GABAPENTIN 100 MILLIGRAM(S): 400 CAPSULE ORAL at 06:20

## 2022-04-10 RX ADMIN — ATORVASTATIN CALCIUM 40 MILLIGRAM(S): 80 TABLET, FILM COATED ORAL at 22:19

## 2022-04-10 NOTE — PROGRESS NOTE ADULT - SUBJECTIVE AND OBJECTIVE BOX
SUBJECTIVE / OVERNIGHT EVENTS:pt seen and examined    MEDICATIONS  (STANDING):  atorvastatin 40 milliGRAM(s) Oral at bedtime  fenofibrate Tablet 145 milliGRAM(s) Oral daily  gabapentin 100 milliGRAM(s) Oral three times a day  hydrALAZINE 25 milliGRAM(s) Oral daily  insulin lispro (ADMELOG) corrective regimen sliding scale   SubCutaneous three times a day before meals  insulin lispro (ADMELOG) corrective regimen sliding scale   SubCutaneous at bedtime  levothyroxine 75 MICROGram(s) Oral daily  metoprolol tartrate 50 milliGRAM(s) Oral two times a day  pantoprazole  Injectable 40 milliGRAM(s) IV Push daily    MEDICATIONS  (PRN):  benzocaine 15 mG/menthol 3.6 mG Lozenge 1 Lozenge Oral three times a day PRN Sore Throat    T(C): 36.1 (04-10-22 @ 20:02), Max: 37.1 (04-10-22 @ 05:10)  HR: 66 (04-10-22 @ 20:02) (63 - 78)  BP: 152/68 (04-10-22 @ 20:02) (133/77 - 176/85)  RR: 16 (04-10-22 @ 20:02) (16 - 18)  SpO2: 97% (04-10-22 @ 20:02) (94% - 97%)    CAPILLARY BLOOD GLUCOSE      POCT Blood Glucose.: 202 mg/dL (10 Apr 2022 21:08)  POCT Blood Glucose.: 330 mg/dL (10 Apr 2022 16:27)  POCT Blood Glucose.: 297 mg/dL (10 Apr 2022 11:33)  POCT Blood Glucose.: 276 mg/dL (10 Apr 2022 07:40)    I&O's Summary      Constitutional: No fever, fatigue  Skin: No rash.  Eyes: No recent vision problems or eye pain.  ENT: No congestion, ear pain, or sore throat.  Cardiovascular: No chest pain or palpation.  Respiratory: No cough, shortness of breath, congestion, or wheezing.  Gastrointestinal: No abdominal pain, nausea, vomiting, or diarrhea.  Genitourinary: No dysuria.  Musculoskeletal: No joint swelling.  Neurologic: No headache.    PHYSICAL EXAM:  GENERAL: NAD  EYES: EOMI, PERRLA  NECK: Supple, No JVD  CHEST/LUNG: dec breath sounds rt base  HEART:  S1 , S2 +  ABDOMEN: soft, bs+  EXTREMITIES:  no edema  NEUROLOGY:alert awake      LABS:                        7.8    4.59  )-----------( 278      ( 10 Apr 2022 06:47 )             25.6     04-10    138  |  104  |  13  ----------------------------<  254<H>  4.9   |  29  |  1.18    Ca    8.8      10 Apr 2022 06:47  Phos  4.7     04-10  Mg     1.9     04-10    TPro  7.0  /  Alb  3.6  /  TBili  0.3  /  DBili  x   /  AST  20  /  ALT  38  /  AlkPhos  30<L>  04-10              RADIOLOGY & ADDITIONAL TESTS:    Imaging Personally Reviewed:    Consultant(s) Notes Reviewed:      Care Discussed with Consultants/Other Providers:

## 2022-04-10 NOTE — PROGRESS NOTE ADULT - PROBLEM SELECTOR PLAN 1
Pt presented with fatigue, weakness and SOB on exertion for last two weeks  No h/o lidia, hematochezia, hematuria or any other sources of bleed  Was on xarelto for Afib  Vitals stable  Physical exam unremarkable  Hemoglobin was 6.2 on admission  received 1U PRBC  Repeat after transfusion, hemoglobin is 7.5  follow up FOBT  follow up LDH, fibrinogen, haptoglobin, ESR, Direct Parmjit  monitor CBC AM  transfuse if hemoglobin <7g/dl  hem f/u

## 2022-04-10 NOTE — PROGRESS NOTE ADULT - ASSESSMENT
Pt is a 75 yo F from home, walks with a walker, with PMH of CAD (s/p CABG in 2016), Atrial Fibrillation (s/p PPM, on xarelto), Hypothyroidism HTN, HLD, DM, presents to the ED with complaints of fatiigue, weakness and SOB on exertion for 2 weeks. Admitted for symptomatic anemia requiring blood transfusion

## 2022-04-11 DIAGNOSIS — Z02.9 ENCOUNTER FOR ADMINISTRATIVE EXAMINATIONS, UNSPECIFIED: ICD-10-CM

## 2022-04-11 LAB
ALBUMIN SERPL ELPH-MCNC: 3.4 G/DL — LOW (ref 3.5–5)
ALP SERPL-CCNC: 33 U/L — LOW (ref 40–120)
ALT FLD-CCNC: 34 U/L DA — SIGNIFICANT CHANGE UP (ref 10–60)
ANION GAP SERPL CALC-SCNC: 7 MMOL/L — SIGNIFICANT CHANGE UP (ref 5–17)
AST SERPL-CCNC: 15 U/L — SIGNIFICANT CHANGE UP (ref 10–40)
BASOPHILS # BLD AUTO: 0.03 K/UL — SIGNIFICANT CHANGE UP (ref 0–0.2)
BASOPHILS NFR BLD AUTO: 0.8 % — SIGNIFICANT CHANGE UP (ref 0–2)
BILIRUB SERPL-MCNC: 0.3 MG/DL — SIGNIFICANT CHANGE UP (ref 0.2–1.2)
BUN SERPL-MCNC: 10 MG/DL — SIGNIFICANT CHANGE UP (ref 7–18)
CALCIUM SERPL-MCNC: 9.1 MG/DL — SIGNIFICANT CHANGE UP (ref 8.4–10.5)
CHLORIDE SERPL-SCNC: 106 MMOL/L — SIGNIFICANT CHANGE UP (ref 96–108)
CO2 SERPL-SCNC: 26 MMOL/L — SIGNIFICANT CHANGE UP (ref 22–31)
CREAT SERPL-MCNC: 1.05 MG/DL — SIGNIFICANT CHANGE UP (ref 0.5–1.3)
EGFR: 55 ML/MIN/1.73M2 — LOW
EOSINOPHIL # BLD AUTO: 0.11 K/UL — SIGNIFICANT CHANGE UP (ref 0–0.5)
EOSINOPHIL NFR BLD AUTO: 3.1 % — SIGNIFICANT CHANGE UP (ref 0–6)
GLUCOSE BLDC GLUCOMTR-MCNC: 232 MG/DL — HIGH (ref 70–99)
GLUCOSE BLDC GLUCOMTR-MCNC: 264 MG/DL — HIGH (ref 70–99)
GLUCOSE BLDC GLUCOMTR-MCNC: 387 MG/DL — HIGH (ref 70–99)
GLUCOSE BLDC GLUCOMTR-MCNC: 476 MG/DL — CRITICAL HIGH (ref 70–99)
GLUCOSE SERPL-MCNC: 248 MG/DL — HIGH (ref 70–99)
HCT VFR BLD CALC: 25.4 % — LOW (ref 34.5–45)
HGB BLD-MCNC: 7.9 G/DL — LOW (ref 11.5–15.5)
IMM GRANULOCYTES NFR BLD AUTO: 0.6 % — SIGNIFICANT CHANGE UP (ref 0–1.5)
LYMPHOCYTES # BLD AUTO: 0.96 K/UL — LOW (ref 1–3.3)
LYMPHOCYTES # BLD AUTO: 27.2 % — SIGNIFICANT CHANGE UP (ref 13–44)
MAGNESIUM SERPL-MCNC: 1.7 MG/DL — SIGNIFICANT CHANGE UP (ref 1.6–2.6)
MCHC RBC-ENTMCNC: 27.8 PG — SIGNIFICANT CHANGE UP (ref 27–34)
MCHC RBC-ENTMCNC: 31.1 GM/DL — LOW (ref 32–36)
MCV RBC AUTO: 89.4 FL — SIGNIFICANT CHANGE UP (ref 80–100)
MONOCYTES # BLD AUTO: 0.32 K/UL — SIGNIFICANT CHANGE UP (ref 0–0.9)
MONOCYTES NFR BLD AUTO: 9.1 % — SIGNIFICANT CHANGE UP (ref 2–14)
NEUTROPHILS # BLD AUTO: 2.09 K/UL — SIGNIFICANT CHANGE UP (ref 1.8–7.4)
NEUTROPHILS NFR BLD AUTO: 59.2 % — SIGNIFICANT CHANGE UP (ref 43–77)
NRBC # BLD: 0 /100 WBCS — SIGNIFICANT CHANGE UP (ref 0–0)
PHOSPHATE SERPL-MCNC: 4.6 MG/DL — HIGH (ref 2.5–4.5)
PLATELET # BLD AUTO: 266 K/UL — SIGNIFICANT CHANGE UP (ref 150–400)
POTASSIUM SERPL-MCNC: 4.4 MMOL/L — SIGNIFICANT CHANGE UP (ref 3.5–5.3)
POTASSIUM SERPL-SCNC: 4.4 MMOL/L — SIGNIFICANT CHANGE UP (ref 3.5–5.3)
PROT SERPL-MCNC: 6.8 G/DL — SIGNIFICANT CHANGE UP (ref 6–8.3)
RBC # BLD: 2.84 M/UL — LOW (ref 3.8–5.2)
RBC # FLD: 17.9 % — HIGH (ref 10.3–14.5)
SODIUM SERPL-SCNC: 139 MMOL/L — SIGNIFICANT CHANGE UP (ref 135–145)
WBC # BLD: 3.53 K/UL — LOW (ref 3.8–10.5)
WBC # FLD AUTO: 3.53 K/UL — LOW (ref 3.8–10.5)

## 2022-04-11 RX ORDER — IRON SUCROSE 20 MG/ML
200 INJECTION, SOLUTION INTRAVENOUS EVERY 24 HOURS
Refills: 0 | Status: DISCONTINUED | OUTPATIENT
Start: 2022-04-11 | End: 2022-04-11

## 2022-04-11 RX ORDER — HYDRALAZINE HCL 50 MG
50 TABLET ORAL
Refills: 0 | Status: DISCONTINUED | OUTPATIENT
Start: 2022-04-11 | End: 2022-04-12

## 2022-04-11 RX ADMIN — Medication 2: at 17:04

## 2022-04-11 RX ADMIN — GABAPENTIN 100 MILLIGRAM(S): 400 CAPSULE ORAL at 13:37

## 2022-04-11 RX ADMIN — Medication 3: at 08:16

## 2022-04-11 RX ADMIN — Medication 5: at 11:38

## 2022-04-11 RX ADMIN — Medication 4: at 22:41

## 2022-04-11 RX ADMIN — PANTOPRAZOLE SODIUM 40 MILLIGRAM(S): 20 TABLET, DELAYED RELEASE ORAL at 11:39

## 2022-04-11 RX ADMIN — Medication 50 MILLIGRAM(S): at 17:10

## 2022-04-11 RX ADMIN — Medication 50 MILLIGRAM(S): at 05:26

## 2022-04-11 RX ADMIN — GABAPENTIN 100 MILLIGRAM(S): 400 CAPSULE ORAL at 05:25

## 2022-04-11 RX ADMIN — Medication 145 MILLIGRAM(S): at 11:37

## 2022-04-11 RX ADMIN — GABAPENTIN 100 MILLIGRAM(S): 400 CAPSULE ORAL at 22:42

## 2022-04-11 RX ADMIN — ATORVASTATIN CALCIUM 40 MILLIGRAM(S): 80 TABLET, FILM COATED ORAL at 22:42

## 2022-04-11 RX ADMIN — Medication 25 MILLIGRAM(S): at 05:26

## 2022-04-11 RX ADMIN — Medication 75 MICROGRAM(S): at 05:26

## 2022-04-11 NOTE — PROGRESS NOTE ADULT - SUBJECTIVE AND OBJECTIVE BOX
CHIEF COMPLAINT  Anemia    HISTORY OF PRESENT ILLNESS  MALISSA CHAPARRO is a 76y Female who presents with a chief complaint of anemia.    No acute events. No complaints.    REVIEW OF SYSTEMS  A complete review of systems was performed; negative except per HPI    PHYSICAL EXAM  T(C): 36.8 (04-11-22 @ 05:15), Max: 36.8 (04-11-22 @ 05:15)  HR: 70 (04-11-22 @ 06:59) (64 - 78)  BP: 166/68 (04-11-22 @ 06:59) (133/77 - 187/60)  RR: 18 (04-11-22 @ 05:15) (16 - 18)  SpO2: 96% (04-11-22 @ 05:15) (96% - 97%)  Constitutional: alert, awake, in no acute distress  Eyes: PERRL, EOMI  HEENT: normocephalic, atraumatic  Neck: supple, non-tender  Cardiovascular: normal perfusion, no peripheral edema  Respiratory: normal respiratory efforts; no increased use of accessory muscles  Gastrointestinal: soft, non-tender  Musculoskeletal: normal range of motion, no deformities noted  Neurological: alert, CN II to XI grossly intact  Skin: warm, dry    LABORATORY DATA                        7.9    3.53  )-----------( 266      ( 11 Apr 2022 07:14 )             25.4     04-11    139  |  106  |  10  ----------------------------<  248<H>  4.4   |  26  |  1.05    Ca    9.1      11 Apr 2022 07:14  Phos  4.6     04-11  Mg     1.7     04-11    TPro  6.8  /  Alb  3.4<L>  /  TBili  0.3  /  DBili  x   /  AST  15  /  ALT  34  /  AlkPhos  33<L>  04-11

## 2022-04-11 NOTE — PROGRESS NOTE ADULT - PROBLEM SELECTOR PLAN 6
Pt has h/o hypothyroidism  started on home med of levothyroxine 75mcg  follow up TSH Pt has h/o hypothyroidism  continue home dose levothyroxine 75mcg

## 2022-04-11 NOTE — PROGRESS NOTE ADULT - ASSESSMENT
Pt is a 75 yo F from home, walks with a walker, with PMH of CAD (s/p CABG in 2016), Atrial Fibrillation (s/p PPM, on xarelto), Hypothyroidism HTN, HLD, DM, presents to the ED with complaints of fatiigue, weakness and SOB on exertion for 2 weeks. Admitted for symptomatic anemia requiring blood transfusion Pt is a 75 yo F from home, walks with a walker, with PMH of CAD (s/p CABG in 2016), Atrial Fibrillation (s/p PPM, on xarelto), Hypothyroidism HTN, HLD, DM, presents to the ED with complaints of fatigue weakness and SOB on exertion for 2 weeks. Admitted to medicine for symptomatic anemia requiring 1U PRBCs. Hemeonc and GI consulted   Holding all A/C pending GI reccs. Will start Iron IV and trend CBC

## 2022-04-11 NOTE — PROGRESS NOTE ADULT - SUBJECTIVE AND OBJECTIVE BOX
-*-*INCOMPLETE  NP Note discussed with  primary attending    Patient is a 76y old  Female who presents with a chief complaint of Symptomatic anemia (11 Apr 2022 13:38)      INTERVAL HPI/OVERNIGHT EVENTS: HTN -180    MEDICATIONS  (STANDING):  atorvastatin 40 milliGRAM(s) Oral at bedtime  fenofibrate Tablet 145 milliGRAM(s) Oral daily  gabapentin 100 milliGRAM(s) Oral three times a day  hydrALAZINE 25 milliGRAM(s) Oral daily  insulin lispro (ADMELOG) corrective regimen sliding scale   SubCutaneous three times a day before meals  insulin lispro (ADMELOG) corrective regimen sliding scale   SubCutaneous at bedtime  levothyroxine 75 MICROGram(s) Oral daily  metoprolol tartrate 50 milliGRAM(s) Oral two times a day  pantoprazole  Injectable 40 milliGRAM(s) IV Push daily    MEDICATIONS  (PRN):  benzocaine 15 mG/menthol 3.6 mG Lozenge 1 Lozenge Oral three times a day PRN Sore Throat      __________________________________________________  REVIEW OF SYSTEMS:    CONSTITUTIONAL: No fever,   EYES: no acute visual disturbances  NECK: No pain or stiffness  RESPIRATORY: No cough; No shortness of breath  CARDIOVASCULAR: No chest pain, no palpitations  GASTROINTESTINAL: No pain. No nausea or vomiting; No diarrhea   NEUROLOGICAL: No headache or numbness, no tremors  MUSCULOSKELETAL: No joint pain, no muscle pain  GENITOURINARY: no dysuria, no frequency, no hesitancy  PSYCHIATRY: no depression , no anxiety  ALL OTHER  ROS negative        Vital Signs Last 24 Hrs  T(C): 36.8 (11 Apr 2022 05:15), Max: 36.8 (11 Apr 2022 05:15)  T(F): 98.2 (11 Apr 2022 05:15), Max: 98.2 (11 Apr 2022 05:15)  HR: 70 (11 Apr 2022 06:59) (65 - 78)  BP: 166/68 (11 Apr 2022 06:59) (152/68 - 187/60)  BP(mean): --  RR: 18 (11 Apr 2022 05:15) (16 - 18)  SpO2: 96% (11 Apr 2022 05:15) (96% - 97%)    ________________________________________________  PHYSICAL EXAM:  GENERAL: NAD  HEENT: Normocephalic;  conjunctivae and sclerae clear  NECK : supple  CHEST/LUNG: Clear to auscultation bilaterally with good air entry   HEART: S1 S2  regular; no murmurs, gallops or rubs  ABDOMEN: Soft, Nontender, Nondistended; Bowel sounds present  EXTREMITIES: no cyanosis; no edema; no calf tenderness  SKIN: warm and dry; no rash  NERVOUS SYSTEM:  Awake and alert; Oriented  to place, person and time  _________________________________________________  LABS:                        7.9    3.53  )-----------( 266      ( 11 Apr 2022 07:14 )             25.4     04-11    139  |  106  |  10  ----------------------------<  248<H>  4.4   |  26  |  1.05    Ca    9.1      11 Apr 2022 07:14  Phos  4.6     04-11  Mg     1.7     04-11    TPro  6.8  /  Alb  3.4<L>  /  TBili  0.3  /  DBili  x   /  AST  15  /  ALT  34  /  AlkPhos  33<L>  04-11        CAPILLARY BLOOD GLUCOSE      POCT Blood Glucose.: 387 mg/dL (11 Apr 2022 11:13)  POCT Blood Glucose.: 264 mg/dL (11 Apr 2022 07:42)  POCT Blood Glucose.: 202 mg/dL (10 Apr 2022 21:08)  POCT Blood Glucose.: 330 mg/dL (10 Apr 2022 16:27)      Imaging Personally Reviewed:  YES    Consultant(s) Notes Reviewed:   YES      Plan of care was discussed with patient and /or primary care giver; all questions and concerns were addressed and care was aligned with patient's wishes.

## 2022-04-11 NOTE — PROGRESS NOTE ADULT - PROBLEM SELECTOR PLAN 4
Pt has h/o type 2 DM  at home on levemir, metformin and januvia  started on insulin HSS  follow up A1c A1C 7.5   at home on levemir, metformin and januvia  continue insulin HSS  monitor bs

## 2022-04-11 NOTE — PROGRESS NOTE ADULT - ASSESSMENT
MALISSA CHAPARRO is a 76y Female who presents with a chief complaint of anemia.    Anemia  - Patient presented with severe anemia. Likely multifactorial.  - Ferritin is low at 27 indicating iron deficiency. Recommend IV iron sucrose 200 mg for 3 days.  - Evaluate for GI bleed with FOBT. Recommend gastroenterology consultation for possible endoscopy/colonoscopy.  - Monitor CBC. Transfuse to maintain hemoglobin goal of 8 given history of coronary artery disease.    Atrial Fibrillation  - Continue to hold rivaroxaban given concern for acute gastrointestinal bleeding.    Will continue to follow.    Terry Sandhu MD  Hematology/Oncology  C: [phone]  O: [phone] MALISSA CHAPARRO is a 76y Female who presents with a chief complaint of anemia.    Anemia  - Patient presented with severe anemia. Likely multifactorial.  - Ferritin is low at 27 indicating iron deficiency. Recommend IV iron sucrose 200 mg for 3 days.  - Evaluate for GI bleed with FOBT. Recommend gastroenterology consultation for possible endoscopy/colonoscopy.  - Monitor CBC. Transfuse to maintain hemoglobin goal of 8 given history of coronary artery disease.    Atrial Fibrillation  - Continue to hold rivaroxaban given concern for acute gastrointestinal bleeding.    Will continue to follow.    Terry Sandhu MD  Hematology/Oncology  O: [phone]

## 2022-04-11 NOTE — PROGRESS NOTE ADULT - PROBLEM SELECTOR PLAN 2
Pt has history of atrial fibrillation   at home on xarelto   HELD Xarelto   started on lopressor 100mg BID with parameters

## 2022-04-11 NOTE — PROGRESS NOTE ADULT - ASSESSMENT
Pt is a 77 yo F from home, walks with a walker, with PMH of CAD (s/p CABG in 2016), Atrial Fibrillation (s/p PPM, on xarelto), Hypothyroidism HTN, HLD, DM, presents to the ED with complaints of fatigue weakness and SOB on exertion for 2 weeks. Admitted to medicine for symptomatic anemia requiring 1U PRBCs. Hemeonc and GI consulted   Holding all A/C pending GI reccs. Will start Iron IV and trend CBC

## 2022-04-11 NOTE — PROGRESS NOTE ADULT - PROBLEM SELECTOR PLAN 1
normochromic anemia   No h/o melena, hematochezia, hematuria or any other sources of bleed  Was on xarelto for Afib- will hold  s/p 1U PRBC  follow up FOBT  follow up LDH, fibrinogen, haptoglobin, ESR, Direct Parmjit  monitor CBC AM  transfuse if hemoglobin <7g/dl  heme f/u

## 2022-04-11 NOTE — PROGRESS NOTE ADULT - SUBJECTIVE AND OBJECTIVE BOX
Patient is a 76y old  Female who presents with a chief complaint of Symptomatic anemia (11 Apr 2022 13:38)      INTERVAL HPI/OVERNIGHT EVENTS: HTN -180    MEDICATIONS  (STANDING):  atorvastatin 40 milliGRAM(s) Oral at bedtime  fenofibrate Tablet 145 milliGRAM(s) Oral daily  gabapentin 100 milliGRAM(s) Oral three times a day  hydrALAZINE 25 milliGRAM(s) Oral daily  insulin lispro (ADMELOG) corrective regimen sliding scale   SubCutaneous three times a day before meals  insulin lispro (ADMELOG) corrective regimen sliding scale   SubCutaneous at bedtime  levothyroxine 75 MICROGram(s) Oral daily  metoprolol tartrate 50 milliGRAM(s) Oral two times a day  pantoprazole  Injectable 40 milliGRAM(s) IV Push daily    MEDICATIONS  (PRN):  benzocaine 15 mG/menthol 3.6 mG Lozenge 1 Lozenge Oral three times a day PRN Sore Throat      __________________________________________________  REVIEW OF SYSTEMS:    CONSTITUTIONAL: No fever,   EYES: no acute visual disturbances  NECK: No pain or stiffness  RESPIRATORY: No cough; No shortness of breath  CARDIOVASCULAR: No chest pain, no palpitations  GASTROINTESTINAL: No pain. No nausea or vomiting; No diarrhea   NEUROLOGICAL: No headache or numbness, no tremors  MUSCULOSKELETAL: No joint pain, no muscle pain  GENITOURINARY: no dysuria, no frequency, no hesitancy  PSYCHIATRY: no depression , no anxiety  ALL OTHER  ROS negative        Vital Signs Last 24 Hrs  T(C): 36.8 (11 Apr 2022 05:15), Max: 36.8 (11 Apr 2022 05:15)  T(F): 98.2 (11 Apr 2022 05:15), Max: 98.2 (11 Apr 2022 05:15)  HR: 70 (11 Apr 2022 06:59) (65 - 78)  BP: 166/68 (11 Apr 2022 06:59) (152/68 - 187/60)  BP(mean): --  RR: 18 (11 Apr 2022 05:15) (16 - 18)  SpO2: 96% (11 Apr 2022 05:15) (96% - 97%)    ________________________________________________  PHYSICAL EXAM:  GENERAL: NAD  HEENT: Normocephalic;  conjunctivae and sclerae clear  NECK : supple  CHEST/LUNG: Clear to auscultation bilaterally with good air entry   HEART: S1 S2  regular; no murmurs, gallops or rubs  ABDOMEN: Soft, Nontender, Nondistended; Bowel sounds present  EXTREMITIES: no cyanosis; no edema; no calf tenderness  SKIN: warm and dry; no rash  NERVOUS SYSTEM:  Awake and alert; Oriented  to place, person and time  _________________________________________________  LABS:                        7.9    3.53  )-----------( 266      ( 11 Apr 2022 07:14 )             25.4     04-11    139  |  106  |  10  ----------------------------<  248<H>  4.4   |  26  |  1.05    Ca    9.1      11 Apr 2022 07:14  Phos  4.6     04-11  Mg     1.7     04-11    TPro  6.8  /  Alb  3.4<L>  /  TBili  0.3  /  DBili  x   /  AST  15  /  ALT  34  /  AlkPhos  33<L>  04-11        CAPILLARY BLOOD GLUCOSE      POCT Blood Glucose.: 387 mg/dL (11 Apr 2022 11:13)  POCT Blood Glucose.: 264 mg/dL (11 Apr 2022 07:42)  POCT Blood Glucose.: 202 mg/dL (10 Apr 2022 21:08)  POCT Blood Glucose.: 330 mg/dL (10 Apr 2022 16:27)      Imaging Personally Reviewed:  YES    Consultant(s) Notes Reviewed:   YES      Plan of care was discussed with patient and /or primary care giver; all questions and concerns were addressed and care was aligned with patient's wishes.

## 2022-04-11 NOTE — PROGRESS NOTE ADULT - PROBLEM SELECTOR PLAN 2
Pt has history of atrial fibrillation   at home on xarelto   HELD Xarelto for severe anemia  started on lopressor 100mg BID with parameters Pt has history of atrial fibrillation   at home on xarelto   HELD Xarelto   started on lopressor 100mg BID with parameters

## 2022-04-11 NOTE — PROGRESS NOTE ADULT - PROBLEM SELECTOR PLAN 1
Pt presented with fatigue, weakness and SOB on exertion for last two weeks  No h/o lidia, hematochezia, hematuria or any other sources of bleed  Was on xarelto for Afib  Vitals stable  Physical exam unremarkable  Hemoglobin was 6.2 on admission  received 1U PRBC  Repeat after transfusion, hemoglobin is 7.5  follow up FOBT  follow up LDH, fibrinogen, haptoglobin, ESR, Direct Parmjit  monitor CBC AM  transfuse if hemoglobin <7g/dl  Heme/Onc Dr. Liu consulted normochromic anemia   No h/o melena, hematochezia, hematuria or any other sources of bleed  Was on xarelto for Afib- will hold  s/p 1U PRBC  follow up FOBT  follow up LDH, fibrinogen, haptoglobin, ESR, Direct Parmjit  monitor CBC AM  transfuse if hemoglobin <7g/dl  Heme/Onc Dr. Liu follows

## 2022-04-11 NOTE — PROGRESS NOTE ADULT - PROBLEM SELECTOR PLAN 3
Pt has h/o HTN   started on hydralazine and lopressor with parameters uncontrolled  's - 180's   hydralazine increased  continue lopressor with parameters  monitor BP

## 2022-04-11 NOTE — PROGRESS NOTE ADULT - PROBLEM SELECTOR PLAN 5
Pt has HLD  started on home meds of atorvastatin and fenofibrate   follow up lipid profile Pt has HLD  started on home meds of atorvastatin and fenofibrate

## 2022-04-12 LAB
ANION GAP SERPL CALC-SCNC: 9 MMOL/L — SIGNIFICANT CHANGE UP (ref 5–17)
BUN SERPL-MCNC: 17 MG/DL — SIGNIFICANT CHANGE UP (ref 7–18)
CALCIUM SERPL-MCNC: 9.2 MG/DL — SIGNIFICANT CHANGE UP (ref 8.4–10.5)
CHLORIDE SERPL-SCNC: 105 MMOL/L — SIGNIFICANT CHANGE UP (ref 96–108)
CO2 SERPL-SCNC: 25 MMOL/L — SIGNIFICANT CHANGE UP (ref 22–31)
CREAT SERPL-MCNC: 1.23 MG/DL — SIGNIFICANT CHANGE UP (ref 0.5–1.3)
EGFR: 46 ML/MIN/1.73M2 — LOW
FIBRINOGEN AG PPP IA-MCNC: 485 MG/DL — HIGH (ref 180–350)
GLUCOSE BLDC GLUCOMTR-MCNC: 259 MG/DL — HIGH (ref 70–99)
GLUCOSE BLDC GLUCOMTR-MCNC: 269 MG/DL — HIGH (ref 70–99)
GLUCOSE BLDC GLUCOMTR-MCNC: 304 MG/DL — HIGH (ref 70–99)
GLUCOSE BLDC GLUCOMTR-MCNC: 371 MG/DL — HIGH (ref 70–99)
GLUCOSE SERPL-MCNC: 274 MG/DL — HIGH (ref 70–99)
HCT VFR BLD CALC: 26 % — LOW (ref 34.5–45)
HGB BLD-MCNC: 8.2 G/DL — LOW (ref 11.5–15.5)
MCHC RBC-ENTMCNC: 27.8 PG — SIGNIFICANT CHANGE UP (ref 27–34)
MCHC RBC-ENTMCNC: 31.5 GM/DL — LOW (ref 32–36)
MCV RBC AUTO: 88.1 FL — SIGNIFICANT CHANGE UP (ref 80–100)
NRBC # BLD: 0 /100 WBCS — SIGNIFICANT CHANGE UP (ref 0–0)
PLATELET # BLD AUTO: 285 K/UL — SIGNIFICANT CHANGE UP (ref 150–400)
POTASSIUM SERPL-MCNC: 4.5 MMOL/L — SIGNIFICANT CHANGE UP (ref 3.5–5.3)
POTASSIUM SERPL-SCNC: 4.5 MMOL/L — SIGNIFICANT CHANGE UP (ref 3.5–5.3)
RBC # BLD: 2.95 M/UL — LOW (ref 3.8–5.2)
RBC # FLD: 17.1 % — HIGH (ref 10.3–14.5)
SODIUM SERPL-SCNC: 139 MMOL/L — SIGNIFICANT CHANGE UP (ref 135–145)
WBC # BLD: 3.94 K/UL — SIGNIFICANT CHANGE UP (ref 3.8–10.5)
WBC # FLD AUTO: 3.94 K/UL — SIGNIFICANT CHANGE UP (ref 3.8–10.5)

## 2022-04-12 RX ORDER — SOD SULF/SODIUM/NAHCO3/KCL/PEG
4000 SOLUTION, RECONSTITUTED, ORAL ORAL ONCE
Refills: 0 | Status: COMPLETED | OUTPATIENT
Start: 2022-04-12 | End: 2022-04-12

## 2022-04-12 RX ORDER — IOHEXOL 300 MG/ML
30 INJECTION, SOLUTION INTRAVENOUS ONCE
Refills: 0 | Status: DISCONTINUED | OUTPATIENT
Start: 2022-04-12 | End: 2022-04-14

## 2022-04-12 RX ORDER — HYDRALAZINE HCL 50 MG
50 TABLET ORAL THREE TIMES A DAY
Refills: 0 | Status: DISCONTINUED | OUTPATIENT
Start: 2022-04-12 | End: 2022-04-16

## 2022-04-12 RX ADMIN — Medication 3: at 16:50

## 2022-04-12 RX ADMIN — Medication 5: at 11:52

## 2022-04-12 RX ADMIN — Medication 1: at 22:09

## 2022-04-12 RX ADMIN — Medication 50 MILLIGRAM(S): at 18:07

## 2022-04-12 RX ADMIN — Medication 50 MILLIGRAM(S): at 14:04

## 2022-04-12 RX ADMIN — PANTOPRAZOLE SODIUM 40 MILLIGRAM(S): 20 TABLET, DELAYED RELEASE ORAL at 11:53

## 2022-04-12 RX ADMIN — Medication 75 MICROGRAM(S): at 06:23

## 2022-04-12 RX ADMIN — Medication 50 MILLIGRAM(S): at 22:11

## 2022-04-12 RX ADMIN — Medication 20 MILLIGRAM(S): at 22:11

## 2022-04-12 RX ADMIN — ATORVASTATIN CALCIUM 40 MILLIGRAM(S): 80 TABLET, FILM COATED ORAL at 22:11

## 2022-04-12 RX ADMIN — Medication 145 MILLIGRAM(S): at 11:52

## 2022-04-12 RX ADMIN — Medication 50 MILLIGRAM(S): at 06:22

## 2022-04-12 RX ADMIN — Medication 4000 MILLILITER(S): at 14:04

## 2022-04-12 RX ADMIN — GABAPENTIN 100 MILLIGRAM(S): 400 CAPSULE ORAL at 06:22

## 2022-04-12 RX ADMIN — GABAPENTIN 100 MILLIGRAM(S): 400 CAPSULE ORAL at 22:12

## 2022-04-12 RX ADMIN — GABAPENTIN 100 MILLIGRAM(S): 400 CAPSULE ORAL at 14:04

## 2022-04-12 RX ADMIN — Medication 4: at 08:18

## 2022-04-12 NOTE — PROGRESS NOTE ADULT - SUBJECTIVE AND OBJECTIVE BOX
-*-*- INCOMNPLETRE  NP Note discussed with  primary attending    Patient is a 76y old  Female who presents with a chief complaint of Symptomatic anemia (12 Apr 2022 12:23)      INTERVAL HPI/OVERNIGHT EVENTS: no acute medical complaints     MEDICATIONS  (STANDING):  atorvastatin 40 milliGRAM(s) Oral at bedtime  bisacodyl 20 milliGRAM(s) Oral at bedtime  fenofibrate Tablet 145 milliGRAM(s) Oral daily  gabapentin 100 milliGRAM(s) Oral three times a day  hydrALAZINE 50 milliGRAM(s) Oral three times a day  insulin lispro (ADMELOG) corrective regimen sliding scale   SubCutaneous three times a day before meals  insulin lispro (ADMELOG) corrective regimen sliding scale   SubCutaneous at bedtime  iohexol 300 mG (iodine)/mL Oral Solution 30 milliLiter(s) Oral once  levothyroxine 75 MICROGram(s) Oral daily  metoprolol tartrate 50 milliGRAM(s) Oral two times a day  pantoprazole  Injectable 40 milliGRAM(s) IV Push daily    MEDICATIONS  (PRN):  benzocaine 15 mG/menthol 3.6 mG Lozenge 1 Lozenge Oral three times a day PRN Sore Throat      __________________________________________________  REVIEW OF SYSTEMS:    CONSTITUTIONAL: No fever,   EYES: no acute visual disturbances  NECK: No pain or stiffness  RESPIRATORY: No cough; No shortness of breath  CARDIOVASCULAR: No chest pain, no palpitations  GASTROINTESTINAL: No pain. No nausea or vomiting; No diarrhea   NEUROLOGICAL: No headache or numbness, no tremors  MUSCULOSKELETAL: No joint pain, no muscle pain  GENITOURINARY: no dysuria, no frequency, no hesitancy  PSYCHIATRY: no depression , no anxiety  ALL OTHER  ROS negative        Vital Signs Last 24 Hrs  T(C): 36.7 (12 Apr 2022 13:24), Max: 36.7 (11 Apr 2022 20:30)  T(F): 98.1 (12 Apr 2022 13:24), Max: 98.1 (12 Apr 2022 05:25)  HR: 70 (12 Apr 2022 13:24) (64 - 70)  BP: 143/58 (12 Apr 2022 13:24) (143/58 - 179/66)  BP(mean): --  RR: 18 (12 Apr 2022 13:24) (17 - 18)  SpO2: 99% (12 Apr 2022 13:24) (94% - 99%)    ________________________________________________  PHYSICAL EXAM:  GENERAL: NAD  HEENT: Normocephalic;  conjunctivae and sclerae clear  NECK : supple  CHEST/LUNG: Clear to ausculitation bilaterally with good air entry   HEART: S1 S2  regular; no murmurs, gallops or rubs  ABDOMEN: Soft, Nontender, Nondistended; Bowel sounds present  EXTREMITIES: no cyanosis; no edema; no calf tenderness  SKIN: warm and dry; no rash  NERVOUS SYSTEM:  Awake and alert; Oriented  to place, person and time  _________________________________________________  LABS:                        8.2    3.94  )-----------( 285      ( 12 Apr 2022 07:51 )             26.0     04-12    139  |  105  |  17  ----------------------------<  274<H>  4.5   |  25  |  1.23    Ca    9.2      12 Apr 2022 07:51  Phos  4.6     04-11  Mg     1.7     04-11    TPro  6.8  /  Alb  3.4<L>  /  TBili  0.3  /  DBili  x   /  AST  15  /  ALT  34  /  AlkPhos  33<L>  04-11        CAPILLARY BLOOD GLUCOSE      POCT Blood Glucose.: 371 mg/dL (12 Apr 2022 11:15)  POCT Blood Glucose.: 304 mg/dL (12 Apr 2022 07:33)  POCT Blood Glucose.: 476 mg/dL (11 Apr 2022 22:08)  POCT Blood Glucose.: 232 mg/dL (11 Apr 2022 16:31)        Imaging Personally Reviewed:  YES    Consultant(s) Notes Reviewed:   YES    Care Discussed with Consultants : GI    Plan of care was discussed with patient and /or primary care giver; all questions and concerns were addressed and care was aligned with patient's wishes.

## 2022-04-12 NOTE — PROGRESS NOTE ADULT - PROBLEM SELECTOR PLAN 1
normochromic anemia   No h/o melena, hematochezia, hematuria or any other sources of bleed  Was on xarelto for Afib- will hold  s/p 1U PRBC  follow up FOBT  follow up LDH, fibrinogen, haptoglobin, ESR, Direct Parmjit  monitor CBC AM  transfuse if hemoglobin <7g/dl  Heme/Onc Dr. Liu follows normochromic anemia   No h/o melena, hematochezia, hematuria or any other sources of bleed  Was on xarelto for Afib- will hold  s/p 1U PRBC  now stable   trend CBC   transfuse if hemoglobin <7g/dl  Heme/Onc Dr. Liu follows

## 2022-04-12 NOTE — PROGRESS NOTE ADULT - PROBLEM SELECTOR PLAN 8
Pending clinical course   f/u GI consult   trend CBC F/U CT Abd  pending EGD / colonoscopy in AM   will likely d/c back home if stable

## 2022-04-12 NOTE — PROGRESS NOTE ADULT - PROBLEM SELECTOR PLAN 2
Pt has history of atrial fibrillation   at home on xarelto   HELD Xarelto   started on lopressor 100mg BID with parameters rate controlled  at home on xarelto   hold Xarelto   started on lopressor 100mg BID with parameters

## 2022-04-12 NOTE — PROGRESS NOTE ADULT - ASSESSMENT
Pt is a 77 yo F from home, walks with a walker, with PMH of CAD (s/p CABG in 2016), Atrial Fibrillation (s/p PPM, on xarelto), Hypothyroidism HTN, HLD, DM, presents to the ED with complaints of fatigue weakness and SOB on exertion for 2 weeks. Admitted to medicine for symptomatic anemia requiring 1U PRBCs. Hemeonc and GI consulted   Holding all A/C, plan for CT abd, EGD/colonoscopy in AM

## 2022-04-12 NOTE — PROGRESS NOTE ADULT - PROBLEM SELECTOR PLAN 1
normochromic anemia   No h/o melena, hematochezia, hematuria or any other sources of bleed  Was on xarelto for Afib- will hold  s/p 1U PRBC  now stable   trend CBC   transfuse if hemoglobin <7g/dl

## 2022-04-12 NOTE — PROGRESS NOTE ADULT - ASSESSMENT
Pt is a 77 yo F from home, walks with a walker, with PMH of CAD (s/p CABG in 2016), Atrial Fibrillation (s/p PPM, on xarelto), Hypothyroidism HTN, HLD, DM, presents to the ED with complaints of fatigue weakness and SOB on exertion for 2 weeks. Admitted to medicine for symptomatic anemia requiring 1U PRBCs. Hemeonc and GI consulted   Holding all A/C pending GI reccs. Will start Iron IV and trend CBC  Pt is a 75 yo F from home, walks with a walker, with PMH of CAD (s/p CABG in 2016), Atrial Fibrillation (s/p PPM, on xarelto), Hypothyroidism HTN, HLD, DM, presents to the ED with complaints of fatigue weakness and SOB on exertion for 2 weeks. Admitted to medicine for symptomatic anemia requiring 1U PRBCs. Hemeonc and GI consulted   Holding all A/C, plan for CT abd, EGD/colonoscopy in AM

## 2022-04-12 NOTE — PROGRESS NOTE ADULT - SUBJECTIVE AND OBJECTIVE BOX
Patient is a 76y old  Female who presents with a chief complaint of Symptomatic anemia (12 Apr 2022 12:23)      INTERVAL HPI/OVERNIGHT EVENTS: pt seen and examined    MEDICATIONS  (STANDING):  atorvastatin 40 milliGRAM(s) Oral at bedtime  bisacodyl 20 milliGRAM(s) Oral at bedtime  fenofibrate Tablet 145 milliGRAM(s) Oral daily  gabapentin 100 milliGRAM(s) Oral three times a day  hydrALAZINE 50 milliGRAM(s) Oral three times a day  insulin lispro (ADMELOG) corrective regimen sliding scale   SubCutaneous three times a day before meals  insulin lispro (ADMELOG) corrective regimen sliding scale   SubCutaneous at bedtime  iohexol 300 mG (iodine)/mL Oral Solution 30 milliLiter(s) Oral once  levothyroxine 75 MICROGram(s) Oral daily  metoprolol tartrate 50 milliGRAM(s) Oral two times a day  pantoprazole  Injectable 40 milliGRAM(s) IV Push daily    MEDICATIONS  (PRN):  benzocaine 15 mG/menthol 3.6 mG Lozenge 1 Lozenge Oral three times a day PRN Sore Throat      __________________________________________________  REVIEW OF SYSTEMS:    CONSTITUTIONAL: No fever,   EYES: no acute visual disturbances  NECK: No pain or stiffness  RESPIRATORY: No cough; No shortness of breath  CARDIOVASCULAR: No chest pain, no palpitations  GASTROINTESTINAL: No pain. No nausea or vomiting; No diarrhea   NEUROLOGICAL: No headache or numbness, no tremors  MUSCULOSKELETAL: No joint pain, no muscle pain  GENITOURINARY: no dysuria, no frequency, no hesitancy  PSYCHIATRY: no depression , no anxiety  ALL OTHER  ROS negative        Vital Signs Last 24 Hrs  T(C): 36.7 (12 Apr 2022 13:24), Max: 36.7 (11 Apr 2022 20:30)  T(F): 98.1 (12 Apr 2022 13:24), Max: 98.1 (12 Apr 2022 05:25)  HR: 70 (12 Apr 2022 13:24) (64 - 70)  BP: 143/58 (12 Apr 2022 13:24) (143/58 - 179/66)  BP(mean): --  RR: 18 (12 Apr 2022 13:24) (17 - 18)  SpO2: 99% (12 Apr 2022 13:24) (94% - 99%)    ________________________________________________  PHYSICAL EXAM:  GENERAL: NAD  HEENT: Normocephalic;  conjunctivae and sclerae clear  NECK : supple  CHEST/LUNG: dec breath sounds at bases  HEART: S1 S2  regular; no murmurs, gallops or rubs  ABDOMEN: Soft, Nontender, Nondistended; Bowel sounds present  EXTREMITIES: no cyanosis; no edema; no calf tenderness  SKIN: warm and dry; no rash  NERVOUS SYSTEM:  Awake and alert; Oriented  to place, person and time  _________________________________________________  LABS:                        8.2    3.94  )-----------( 285      ( 12 Apr 2022 07:51 )             26.0     04-12    139  |  105  |  17  ----------------------------<  274<H>  4.5   |  25  |  1.23    Ca    9.2      12 Apr 2022 07:51  Phos  4.6     04-11  Mg     1.7     04-11    TPro  6.8  /  Alb  3.4<L>  /  TBili  0.3  /  DBili  x   /  AST  15  /  ALT  34  /  AlkPhos  33<L>  04-11        CAPILLARY BLOOD GLUCOSE      POCT Blood Glucose.: 371 mg/dL (12 Apr 2022 11:15)  POCT Blood Glucose.: 304 mg/dL (12 Apr 2022 07:33)  POCT Blood Glucose.: 476 mg/dL (11 Apr 2022 22:08)  POCT Blood Glucose.: 232 mg/dL (11 Apr 2022 16:31)        Imaging Personally Reviewed:  YES    Consultant(s) Notes Reviewed:   YES    Care Discussed with Consultants : GI    Plan of care was discussed with patient and /or primary care giver; all questions and concerns were addressed and care was aligned with patient's wishes.

## 2022-04-12 NOTE — PROGRESS NOTE ADULT - PROBLEM SELECTOR PLAN 3
uncontrolled  's - 180's   hydralazine increased  continue lopressor with parameters  monitor BP uncontrolled - improving  hydralazine increased  continue lopressor with parameters  monitor BP

## 2022-04-13 LAB
ANION GAP SERPL CALC-SCNC: 9 MMOL/L — SIGNIFICANT CHANGE UP (ref 5–17)
BUN SERPL-MCNC: 16 MG/DL — SIGNIFICANT CHANGE UP (ref 7–18)
CALCIUM SERPL-MCNC: 9.1 MG/DL — SIGNIFICANT CHANGE UP (ref 8.4–10.5)
CHLORIDE SERPL-SCNC: 105 MMOL/L — SIGNIFICANT CHANGE UP (ref 96–108)
CO2 SERPL-SCNC: 26 MMOL/L — SIGNIFICANT CHANGE UP (ref 22–31)
CREAT SERPL-MCNC: 1.25 MG/DL — SIGNIFICANT CHANGE UP (ref 0.5–1.3)
EGFR: 45 ML/MIN/1.73M2 — LOW
GLUCOSE BLDC GLUCOMTR-MCNC: 208 MG/DL — HIGH (ref 70–99)
GLUCOSE BLDC GLUCOMTR-MCNC: 267 MG/DL — HIGH (ref 70–99)
GLUCOSE BLDC GLUCOMTR-MCNC: 272 MG/DL — HIGH (ref 70–99)
GLUCOSE BLDC GLUCOMTR-MCNC: 320 MG/DL — HIGH (ref 70–99)
GLUCOSE SERPL-MCNC: 279 MG/DL — HIGH (ref 70–99)
HCT VFR BLD CALC: 26.8 % — LOW (ref 34.5–45)
HGB BLD-MCNC: 8.3 G/DL — LOW (ref 11.5–15.5)
MCHC RBC-ENTMCNC: 27.9 PG — SIGNIFICANT CHANGE UP (ref 27–34)
MCHC RBC-ENTMCNC: 31 GM/DL — LOW (ref 32–36)
MCV RBC AUTO: 89.9 FL — SIGNIFICANT CHANGE UP (ref 80–100)
NRBC # BLD: 0 /100 WBCS — SIGNIFICANT CHANGE UP (ref 0–0)
PLATELET # BLD AUTO: 297 K/UL — SIGNIFICANT CHANGE UP (ref 150–400)
POTASSIUM SERPL-MCNC: 4.7 MMOL/L — SIGNIFICANT CHANGE UP (ref 3.5–5.3)
POTASSIUM SERPL-SCNC: 4.7 MMOL/L — SIGNIFICANT CHANGE UP (ref 3.5–5.3)
RBC # BLD: 2.98 M/UL — LOW (ref 3.8–5.2)
RBC # FLD: 17 % — HIGH (ref 10.3–14.5)
SARS-COV-2 RNA SPEC QL NAA+PROBE: SIGNIFICANT CHANGE UP
SODIUM SERPL-SCNC: 140 MMOL/L — SIGNIFICANT CHANGE UP (ref 135–145)
TROPONIN I, HIGH SENSITIVITY RESULT: 16.5 NG/L — SIGNIFICANT CHANGE UP
TROPONIN I, HIGH SENSITIVITY RESULT: 17.4 NG/L — SIGNIFICANT CHANGE UP
WBC # BLD: 3.97 K/UL — SIGNIFICANT CHANGE UP (ref 3.8–10.5)
WBC # FLD AUTO: 3.97 K/UL — SIGNIFICANT CHANGE UP (ref 3.8–10.5)

## 2022-04-13 RX ORDER — LOSARTAN POTASSIUM 100 MG/1
50 TABLET, FILM COATED ORAL DAILY
Refills: 0 | Status: DISCONTINUED | OUTPATIENT
Start: 2022-04-13 | End: 2022-04-16

## 2022-04-13 RX ORDER — INSULIN GLARGINE 100 [IU]/ML
15 INJECTION, SOLUTION SUBCUTANEOUS AT BEDTIME
Refills: 0 | Status: DISCONTINUED | OUTPATIENT
Start: 2022-04-13 | End: 2022-04-14

## 2022-04-13 RX ADMIN — Medication 75 MICROGRAM(S): at 06:12

## 2022-04-13 RX ADMIN — Medication 50 MILLIGRAM(S): at 22:16

## 2022-04-13 RX ADMIN — GABAPENTIN 100 MILLIGRAM(S): 400 CAPSULE ORAL at 06:12

## 2022-04-13 RX ADMIN — Medication 4: at 08:01

## 2022-04-13 RX ADMIN — Medication 50 MILLIGRAM(S): at 06:12

## 2022-04-13 RX ADMIN — Medication 3: at 16:47

## 2022-04-13 RX ADMIN — Medication 145 MILLIGRAM(S): at 14:20

## 2022-04-13 RX ADMIN — GABAPENTIN 100 MILLIGRAM(S): 400 CAPSULE ORAL at 22:11

## 2022-04-13 RX ADMIN — Medication 50 MILLIGRAM(S): at 18:45

## 2022-04-13 RX ADMIN — ATORVASTATIN CALCIUM 40 MILLIGRAM(S): 80 TABLET, FILM COATED ORAL at 22:11

## 2022-04-13 RX ADMIN — Medication 1: at 22:10

## 2022-04-13 RX ADMIN — Medication 50 MILLIGRAM(S): at 14:21

## 2022-04-13 RX ADMIN — GABAPENTIN 100 MILLIGRAM(S): 400 CAPSULE ORAL at 14:20

## 2022-04-13 RX ADMIN — PANTOPRAZOLE SODIUM 40 MILLIGRAM(S): 20 TABLET, DELAYED RELEASE ORAL at 14:20

## 2022-04-13 RX ADMIN — INSULIN GLARGINE 15 UNIT(S): 100 INJECTION, SOLUTION SUBCUTANEOUS at 22:11

## 2022-04-13 NOTE — PROGRESS NOTE ADULT - SUBJECTIVE AND OBJECTIVE BOX
[   ] ICU                                          [   ] CCU                                      [  X ] Medical Floor    Patient is a 76 year old female with symptomatic anemia, GI consulted to evaluate.        Pt is a 76 year old female from home, walks with a walker, with past medical history significant for HTN, CAD (s/p CABG in 2016), Atrial Fibrillation (s/p PPM, on Xarelto, Hypothyroidism, HLD, DM, presented to emergency room with 2 weeks history of worsening fatigue and weakness associated with sever anemia.    Patient c/o constipation but denies abdominal pain, nausea, vomiting, hematemesis, hematochezia, melena, fever, chills, chest pain, cough, epistaxis, hemoptysis, hematuria, dysuria or diarrhea.        Patient appears comfortable. No new complaints reported, No abdominal pain, N/V, hematemesis, hematochezia, melena, fever, chills, chest pain, SOB, cough or diarrhea reported.      PAIN MANAGEMENT:  Pain Scale:                 0/10  Pain Location:        Prior Colonoscopy:  No prior colonoscopy      PAST MEDICAL HISTORY  Atrial fibrillation and flutter  CAD   Hypertension  DM   Hypothyroidism  Hyperlipidemia        PAST SURGICAL HISTORY  CABG x 1  Cardiac pacemaker        Allergies    No Known Allergies    Intolerances  None       MEDICATIONS  (STANDING):  atorvastatin 40 milliGRAM(s) Oral at bedtime  fenofibrate Tablet 145 milliGRAM(s) Oral daily  gabapentin 100 milliGRAM(s) Oral three times a day  hydrALAZINE 50 milliGRAM(s) Oral three times a day  insulin lispro (ADMELOG) corrective regimen sliding scale   SubCutaneous three times a day before meals  insulin lispro (ADMELOG) corrective regimen sliding scale   SubCutaneous at bedtime  levothyroxine 75 MICROGram(s) Oral daily  metoprolol tartrate 50 milliGRAM(s) Oral two times a day  pantoprazole  Injectable 40 milliGRAM(s) IV Push daily    MEDICATIONS  (PRN):  benzocaine 15 mG/menthol 3.6 mG Lozenge 1 Lozenge Oral three times a day PRN Sore Throat      SOCIAL HISTORY  Advanced Directives:       [ X ] Full Code       [  ] DNR  Marital Status:         [  ] M      [ X ] S      [  ] D       [  ] W  Children:       [ X ] Yes      [  ] No  Occupation:        [  ] Employed       [ X ] Unemployed       [  ] Retired  Diet:       [ X ] Regular       [  ] PEG feeding          [  ] NG tube feeding  Drug Use:           [ X ] Patient denied          [  ] Yes  Alcohol:           [ X ] No             [  ] Yes (socially)         [  ] Yes (chronic)  Tobacco:           [  ] Yes           [ X ] No      FAMILY HISTORY  [ X ] Heart Disease            [ X ] Diabetes             [ X ] HTN             [  ] Colon Cancer             [  ] Stomach Cancer              [  ] Pancreatic Cancer         VITALS  Vital Signs Last 24 Hrs  T(C): 36.8 (13 Apr 2022 05:46), Max: 36.8 (13 Apr 2022 05:46)  T(F): 98.3 (13 Apr 2022 05:46), Max: 98.3 (13 Apr 2022 05:46)  HR: 83 (13 Apr 2022 05:46) (61 - 83)  BP: 156/57 (13 Apr 2022 05:46) (143/58 - 159/63)  BP(mean): 80 (12 Apr 2022 20:52) (80 - 87)  RR: 17 (13 Apr 2022 05:46) (17 - 18)  SpO2: 95% (13 Apr 2022 05:46) (95% - 99%)       MEDICATIONS  (STANDING):  atorvastatin 40 milliGRAM(s) Oral at bedtime  fenofibrate Tablet 145 milliGRAM(s) Oral daily  gabapentin 100 milliGRAM(s) Oral three times a day  hydrALAZINE 50 milliGRAM(s) Oral three times a day  insulin lispro (ADMELOG) corrective regimen sliding scale   SubCutaneous three times a day before meals  insulin lispro (ADMELOG) corrective regimen sliding scale   SubCutaneous at bedtime  iohexol 300 mG (iodine)/mL Oral Solution 30 milliLiter(s) Oral once  levothyroxine 75 MICROGram(s) Oral daily  metoprolol tartrate 50 milliGRAM(s) Oral two times a day  pantoprazole  Injectable 40 milliGRAM(s) IV Push daily    MEDICATIONS  (PRN):  benzocaine 15 mG/menthol 3.6 mG Lozenge 1 Lozenge Oral three times a day PRN Sore Throat                            8.3    3.97  )-----------( 297      ( 13 Apr 2022 06:33 )             26.8       04-13    140  |  105  |  16  ----------------------------<  279<H>  4.7   |  26  |  1.25    Ca    9.1      13 Apr 2022 06:33

## 2022-04-13 NOTE — PROGRESS NOTE ADULT - PROBLEM SELECTOR PLAN 1
normochromic anemia   No h/o melena, hematochezia, hematuria or any other sources of bleed  Was on xarelto for Afib- will hold  s/p 1U PRBC  now stable   trend CBC   f/u troponin to r/o demand ischemia  transfuse if hemoglobin <7g/dl  Heme/Onc Dr. Liu follows

## 2022-04-13 NOTE — PROGRESS NOTE ADULT - ASSESSMENT
Pt is a 75 yo F from home, walks with a walker, with PMH of CAD (s/p CABG in 2016), Atrial Fibrillation (s/p PPM, on xarelto), Hypothyroidism HTN, HLD, DM, presents to the ED with complaints of fatigue weakness and SOB on exertion for 2 weeks. Admitted to medicine for symptomatic anemia requiring 1U PRBCs. Heme onc and GI Dr. Tapia following.     Pt is aox3, afebrile, c/o dizziness, denies any shortness of breath, chest pain, nausea, vomiting or any bloody bowel movements. EGD and colonoscopy on hold due to pending cardiac workup. EKG NSR. Pending troponin results. Pt is also pending for CT abd/pelv.  Pt is a 77 yo F from home, walks with a walker, with PMH of CAD (s/p CABG in 2016), Atrial Fibrillation (s/p PPM, on xarelto), Hypothyroidism HTN, HLD, DM, presents to the ED with complaints of fatigue weakness and SOB on exertion for 2 weeks. Admitted to medicine for symptomatic anemia requiring 1U PRBCs. Heme onc and GI Dr. Tapia following.     Pt is aox3, afebrile, c/o dizziness, denies any shortness of breath, chest pain, nausea, vomiting or any bloody bowel movements. EGD and colonoscopy moved to tomorrow, due to pending cardiac workup. Cardiology Dr. Sharif consulted. EKG NSR. Pending troponin results. Pt is also pending for CT abd/pelv.  Pt is a 77 yo F from home, walks with a walker, with PMH of CAD (s/p CABG in 2016), Atrial Fibrillation (s/p PPM, on xarelto), Hypothyroidism HTN, HLD, DM, presents to the ED with complaints of fatigue weakness and SOB on exertion for 2 weeks. Admitted to medicine for symptomatic anemia requiring 1U PRBCs. Heme onc and GI Dr. Tapia following.     Pt is aox3, afebrile, c/o dizziness, denies any shortness of breath, chest pain, nausea, vomiting or any bloody bowel movements. EGD and colonoscopy moved to tomorrow due to pending cardiac workup. Continue Clear liquid diet. Cardiology Dr. Sharif consulted. EKG NSR. Pending troponin results. Pt is also pending for CT abd/pelv.

## 2022-04-13 NOTE — PROGRESS NOTE ADULT - SUBJECTIVE AND OBJECTIVE BOX
Patient is a 76y old  Female who presents with a chief complaint of Symptomatic anemia (13 Apr 2022 12:24)    OVERNIGHT EVENTS: no acute changes    REVIEW OF SYSTEMS:  used Eugene #[id number]  CONSTITUTIONAL: No fever, chills  NECK: No pain or stiffness  RESPIRATORY: No cough, SOB  CARDIOVASCULAR: No chest pain, palpitations  GASTROINTESTINAL: No abdominal pain. No nausea, vomiting, or diarrhea  GENITOURINARY: No dysuria  NEUROLOGICAL: +dizziness. No HA  SKIN: No itching, burning, rashes, or lesions   MUSCULOSKELETAL: No joint pain or swelling; No muscle, back, or extremity pain    T(C): 36.8 (04-13-22 @ 05:46), Max: 36.8 (04-13-22 @ 05:46)  HR: 83 (04-13-22 @ 05:46) (61 - 83)  BP: 156/57 (04-13-22 @ 05:46) (143/58 - 159/63)  RR: 17 (04-13-22 @ 05:46) (17 - 18)  SpO2: 95% (04-13-22 @ 05:46) (95% - 99%)  Wt(kg): --Vital Signs Last 24 Hrs  T(C): 36.8 (13 Apr 2022 05:46), Max: 36.8 (13 Apr 2022 05:46)  T(F): 98.3 (13 Apr 2022 05:46), Max: 98.3 (13 Apr 2022 05:46)  HR: 83 (13 Apr 2022 05:46) (61 - 83)  BP: 156/57 (13 Apr 2022 05:46) (143/58 - 159/63)  BP(mean): 80 (12 Apr 2022 20:52) (80 - 87)  RR: 17 (13 Apr 2022 05:46) (17 - 18)  SpO2: 95% (13 Apr 2022 05:46) (95% - 99%)    MEDICATIONS  (STANDING):  atorvastatin 40 milliGRAM(s) Oral at bedtime  fenofibrate Tablet 145 milliGRAM(s) Oral daily  gabapentin 100 milliGRAM(s) Oral three times a day  hydrALAZINE 50 milliGRAM(s) Oral three times a day  insulin lispro (ADMELOG) corrective regimen sliding scale   SubCutaneous three times a day before meals  insulin lispro (ADMELOG) corrective regimen sliding scale   SubCutaneous at bedtime  iohexol 300 mG (iodine)/mL Oral Solution 30 milliLiter(s) Oral once  levothyroxine 75 MICROGram(s) Oral daily  metoprolol tartrate 50 milliGRAM(s) Oral two times a day  pantoprazole  Injectable 40 milliGRAM(s) IV Push daily    MEDICATIONS  (PRN):  benzocaine 15 mG/menthol 3.6 mG Lozenge 1 Lozenge Oral three times a day PRN Sore Throat      PHYSICAL EXAM:  GENERAL: NAD  EYES: clear conjunctiva  ENMT: Moist mucous membranes  NECK: Supple, No JVD  CHEST/LUNG: Clear to auscultation bilaterally; No rales, rhonchi, wheezing, or rubs  HEART: S1, S2, Regular rate and rhythm  ABDOMEN: Soft, Nontender, Nondistended; Bowel sounds present  NEURO: Alert & Oriented X3  EXTREMITIES: No LE edema, no calf tenderness  SKIN: No rashes or lesions    Consultant(s) Notes Reviewed:  [x ] YES  [ ] NO  Care Discussed with Consultants/Other Providers [ x] YES  [ ] NO    LABS:                        8.3    3.97  )-----------( 297      ( 13 Apr 2022 06:33 )             26.8     04-13    140  |  105  |  16  ----------------------------<  279<H>  4.7   |  26  |  1.25    Ca    9.1      13 Apr 2022 06:33        CAPILLARY BLOOD GLUCOSE      POCT Blood Glucose.: 208 mg/dL (13 Apr 2022 11:21)  POCT Blood Glucose.: 320 mg/dL (13 Apr 2022 07:47)  POCT Blood Glucose.: 269 mg/dL (12 Apr 2022 21:29)  POCT Blood Glucose.: 259 mg/dL (12 Apr 2022 16:25)            RADIOLOGY & ADDITIONAL TESTS:      Imaging Personally Reviewed:  [ ] YES  [ ] NO

## 2022-04-13 NOTE — PROGRESS NOTE ADULT - PROBLEM SELECTOR PLAN 2
rate controlled  ekg- shows sinus rhythm  at home on xarelto   hold Xarelto   continue metoprolol 50 mg BID with parameters

## 2022-04-13 NOTE — PROGRESS NOTE ADULT - PROBLEM SELECTOR PLAN 8
F/U CT Abd  pending EGD / colonoscopy in AM   will likely d/c back home if stable F/U CT Abd  pending EGD / colonoscopy after cardiac workup   will likely d/c back home if stable

## 2022-04-13 NOTE — PROGRESS NOTE ADULT - SUBJECTIVE AND OBJECTIVE BOX
Patient is a 76y old  Female who presents with a chief complaint of Symptomatic anemia (13 Apr 2022 12:24)    OVERNIGHT EVENTS: no acute changes    MEDICATIONS  (STANDING):  atorvastatin 40 milliGRAM(s) Oral at bedtime  fenofibrate Tablet 145 milliGRAM(s) Oral daily  gabapentin 100 milliGRAM(s) Oral three times a day  hydrALAZINE 50 milliGRAM(s) Oral three times a day  insulin glargine Injectable (LANTUS) 15 Unit(s) SubCutaneous at bedtime  insulin lispro (ADMELOG) corrective regimen sliding scale   SubCutaneous three times a day before meals  insulin lispro (ADMELOG) corrective regimen sliding scale   SubCutaneous at bedtime  iohexol 300 mG (iodine)/mL Oral Solution 30 milliLiter(s) Oral once  levothyroxine 75 MICROGram(s) Oral daily  losartan 50 milliGRAM(s) Oral daily  metoprolol tartrate 50 milliGRAM(s) Oral two times a day  pantoprazole  Injectable 40 milliGRAM(s) IV Push daily    MEDICATIONS  (PRN):  benzocaine 15 mG/menthol 3.6 mG Lozenge 1 Lozenge Oral three times a day PRN Sore Throat    Vital Signs Last 24 Hrs  T(C): 37.1 (04-13-22 @ 13:55), Max: 37.1 (04-13-22 @ 13:55)  T(F): 98.8 (04-13-22 @ 13:55), Max: 98.8 (04-13-22 @ 13:55)  HR: 71 (04-13-22 @ 13:55) (61 - 83)  BP: 154/54 (04-13-22 @ 13:55) (150/49 - 159/63)  BP(mean): 79 (04-13-22 @ 13:55) (79 - 87)  RR: 17 (04-13-22 @ 13:55) (17 - 18)  SpO2: 96% (04-13-22 @ 13:55) (95% - 97%)        REVIEW OF SYSTEMS:  used Eugene #[id number]  CONSTITUTIONAL: No fever, chills  NECK: No pain or stiffness  RESPIRATORY: No cough, SOB  CARDIOVASCULAR: No chest pain, palpitations  GASTROINTESTINAL: No abdominal pain. No nausea, vomiting, or diarrhea  GENITOURINARY: No dysuria  NEUROLOGICAL: +dizziness. No HA  SKIN: No itching, burning, rashes, or lesions   MUSCULOSKELETAL: No joint pain or swelling; No muscle, back, or extremity pain    PHYSICAL EXAM:  GENERAL: NAD  EYES: clear conjunctiva  ENMT: Moist mucous membranes  NECK: Supple, No JVD  CHEST/LUNG: Clear to auscultation bilaterally; No rales, rhonchi, wheezing, or rubs  HEART: S1, S2, Regular rate and rhythm  ABDOMEN: Soft, Nontender, Nondistended; Bowel sounds present  NEURO: Alert & Oriented X3  EXTREMITIES: No LE edema, no calf tenderness  SKIN: No rashes or lesions    Consultant(s) Notes Reviewed:  [x ] YES  [ ] NO  Care Discussed with Consultants/Other Providers [ x] YES  [ ] NO    LABS:                        8.3    3.97  )-----------( 297      ( 13 Apr 2022 06:33 )             26.8     04-13    140  |  105  |  16  ----------------------------<  279<H>  4.7   |  26  |  1.25    Ca    9.1      13 Apr 2022 06:33        CAPILLARY BLOOD GLUCOSE      POCT Blood Glucose.: 208 mg/dL (13 Apr 2022 11:21)  POCT Blood Glucose.: 320 mg/dL (13 Apr 2022 07:47)  POCT Blood Glucose.: 269 mg/dL (12 Apr 2022 21:29)  POCT Blood Glucose.: 259 mg/dL (12 Apr 2022 16:25)            RADIOLOGY & ADDITIONAL TESTS:      Imaging Personally Reviewed:  [ ] YES  [ ] NO

## 2022-04-13 NOTE — PROGRESS NOTE ADULT - PROBLEM SELECTOR PLAN 4
A1C 7.5   at home on levemir, metformin and januvia  hold home med while inpatient  glucose 200-300s  FS ACHS  continue ISS  start lantus 15 units daily  glucose goal 140-180  diabetic diet

## 2022-04-13 NOTE — PROGRESS NOTE ADULT - PROBLEM SELECTOR PLAN 3
uncontrolled - improving  continue hydralazine 50 mg TID  continue lopressor with parameters  continue home med losartan 50 mg daily  DM goal <140/90  monitor BP

## 2022-04-13 NOTE — PROGRESS NOTE ADULT - ASSESSMENT
1. Symptomatic anemia  2. No evidence of acute GI bleeding  3. R/o chronic GI bleeding  4. R/o Colorectal neoplasm  5. R/o silent peptic ulcer disease  6. R/o AVM's    Suggestions:    1. Monitor H/H  2. Transfuse PRBC as needed  3. Protonix daily  4. EGD and colonoscopy cancelled today by anesthesia (require cardiac work up)  5. CT-Scan of abdomen and pelvis  6. Avoid NSAID  7. DVT prophylaxis

## 2022-04-13 NOTE — PROGRESS NOTE ADULT - ASSESSMENT
Pt is a 75 yo F from home, walks with a walker, with PMH of CAD (s/p CABG in 2016), Atrial Fibrillation (s/p PPM, on xarelto), Hypothyroidism HTN, HLD, DM, presents to the ED with complaints of fatigue weakness and SOB on exertion for 2 weeks. Admitted to medicine for symptomatic anemia requiring 1U PRBCs. Heme onc and GI Dr. Tapia following.     Pt is aox3, afebrile, c/o dizziness, denies any shortness of breath, chest pain, nausea, vomiting or any bloody bowel movements. EGD and colonoscopy moved to tomorrow due to pending cardiac workup. Continue Clear liquid diet. Cardiology Dr. Sharif consulted. EKG NSR. Pending troponin results. Pt is also pending for CT abd/pelv.

## 2022-04-13 NOTE — PROGRESS NOTE ADULT - PROBLEM SELECTOR PLAN 4
A1C 7.5   at home on levemir, metformin and januvia  continue insulin HSS  monitor bs A1C 7.5   at home on levemir, metformin and januvia  hold home med while inpatient  glucose 200-300s  FS ACHS  continue ISS  start lantus 15 units daily  glucose goal 140-180  diabetic diet

## 2022-04-14 ENCOUNTER — RESULT REVIEW (OUTPATIENT)
Age: 77
End: 2022-04-14

## 2022-04-14 ENCOUNTER — TRANSCRIPTION ENCOUNTER (OUTPATIENT)
Age: 77
End: 2022-04-14

## 2022-04-14 DIAGNOSIS — K63.89 OTHER SPECIFIED DISEASES OF INTESTINE: ICD-10-CM

## 2022-04-14 LAB
ALBUMIN SERPL ELPH-MCNC: 3.6 G/DL — SIGNIFICANT CHANGE UP (ref 3.5–5)
ALP SERPL-CCNC: 32 U/L — LOW (ref 40–120)
ALT FLD-CCNC: 25 U/L DA — SIGNIFICANT CHANGE UP (ref 10–60)
ANION GAP SERPL CALC-SCNC: 6 MMOL/L — SIGNIFICANT CHANGE UP (ref 5–17)
AST SERPL-CCNC: 8 U/L — LOW (ref 10–40)
BILIRUB SERPL-MCNC: 0.3 MG/DL — SIGNIFICANT CHANGE UP (ref 0.2–1.2)
BUN SERPL-MCNC: 15 MG/DL — SIGNIFICANT CHANGE UP (ref 7–18)
CALCIUM SERPL-MCNC: 9 MG/DL — SIGNIFICANT CHANGE UP (ref 8.4–10.5)
CHLORIDE SERPL-SCNC: 104 MMOL/L — SIGNIFICANT CHANGE UP (ref 96–108)
CO2 SERPL-SCNC: 28 MMOL/L — SIGNIFICANT CHANGE UP (ref 22–31)
CREAT SERPL-MCNC: 1.33 MG/DL — HIGH (ref 0.5–1.3)
EGFR: 42 ML/MIN/1.73M2 — LOW
GLUCOSE BLDC GLUCOMTR-MCNC: 156 MG/DL — HIGH (ref 70–99)
GLUCOSE BLDC GLUCOMTR-MCNC: 201 MG/DL — HIGH (ref 70–99)
GLUCOSE BLDC GLUCOMTR-MCNC: 373 MG/DL — HIGH (ref 70–99)
GLUCOSE BLDC GLUCOMTR-MCNC: 401 MG/DL — HIGH (ref 70–99)
GLUCOSE BLDC GLUCOMTR-MCNC: 415 MG/DL — HIGH (ref 70–99)
GLUCOSE SERPL-MCNC: 213 MG/DL — HIGH (ref 70–99)
HCT VFR BLD CALC: 27.7 % — LOW (ref 34.5–45)
HGB BLD-MCNC: 8.5 G/DL — LOW (ref 11.5–15.5)
MCHC RBC-ENTMCNC: 27.5 PG — SIGNIFICANT CHANGE UP (ref 27–34)
MCHC RBC-ENTMCNC: 30.7 GM/DL — LOW (ref 32–36)
MCV RBC AUTO: 89.6 FL — SIGNIFICANT CHANGE UP (ref 80–100)
NRBC # BLD: 0 /100 WBCS — SIGNIFICANT CHANGE UP (ref 0–0)
PLATELET # BLD AUTO: 285 K/UL — SIGNIFICANT CHANGE UP (ref 150–400)
POTASSIUM SERPL-MCNC: 4.4 MMOL/L — SIGNIFICANT CHANGE UP (ref 3.5–5.3)
POTASSIUM SERPL-SCNC: 4.4 MMOL/L — SIGNIFICANT CHANGE UP (ref 3.5–5.3)
PROT SERPL-MCNC: 6.9 G/DL — SIGNIFICANT CHANGE UP (ref 6–8.3)
RBC # BLD: 3.09 M/UL — LOW (ref 3.8–5.2)
RBC # FLD: 16.4 % — HIGH (ref 10.3–14.5)
SODIUM SERPL-SCNC: 138 MMOL/L — SIGNIFICANT CHANGE UP (ref 135–145)
WBC # BLD: 3.5 K/UL — LOW (ref 3.8–10.5)
WBC # FLD AUTO: 3.5 K/UL — LOW (ref 3.8–10.5)

## 2022-04-14 PROCEDURE — 88312 SPECIAL STAINS GROUP 1: CPT | Mod: 26

## 2022-04-14 PROCEDURE — 99223 1ST HOSP IP/OBS HIGH 75: CPT

## 2022-04-14 PROCEDURE — 74177 CT ABD & PELVIS W/CONTRAST: CPT | Mod: 26

## 2022-04-14 PROCEDURE — 88305 TISSUE EXAM BY PATHOLOGIST: CPT | Mod: 26

## 2022-04-14 PROCEDURE — 71260 CT THORAX DX C+: CPT | Mod: 26

## 2022-04-14 DEVICE — CATH ESOPH DIL 8 ATM 6FR10-12M: Type: IMPLANTABLE DEVICE | Status: FUNCTIONAL

## 2022-04-14 DEVICE — CATH BALLOON DIL 6-8MM: Type: IMPLANTABLE DEVICE | Status: FUNCTIONAL

## 2022-04-14 DEVICE — CATH ESOPH DIL 9 ATM 6FR 8-10MM: Type: IMPLANTABLE DEVICE | Status: FUNCTIONAL

## 2022-04-14 RX ORDER — SODIUM CHLORIDE 9 MG/ML
1000 INJECTION, SOLUTION INTRAVENOUS
Refills: 0 | Status: DISCONTINUED | OUTPATIENT
Start: 2022-04-14 | End: 2022-04-16

## 2022-04-14 RX ORDER — INSULIN LISPRO 100/ML
7 VIAL (ML) SUBCUTANEOUS
Refills: 0 | Status: DISCONTINUED | OUTPATIENT
Start: 2022-04-14 | End: 2022-04-16

## 2022-04-14 RX ORDER — IOHEXOL 300 MG/ML
30 INJECTION, SOLUTION INTRAVENOUS ONCE
Refills: 0 | Status: COMPLETED | OUTPATIENT
Start: 2022-04-14 | End: 2022-04-14

## 2022-04-14 RX ORDER — INSULIN GLARGINE 100 [IU]/ML
22 INJECTION, SOLUTION SUBCUTANEOUS AT BEDTIME
Refills: 0 | Status: DISCONTINUED | OUTPATIENT
Start: 2022-04-14 | End: 2022-04-15

## 2022-04-14 RX ADMIN — Medication 50 MILLIGRAM(S): at 05:53

## 2022-04-14 RX ADMIN — SODIUM CHLORIDE 75 MILLILITER(S): 9 INJECTION, SOLUTION INTRAVENOUS at 22:50

## 2022-04-14 RX ADMIN — Medication 7 UNIT(S): at 17:09

## 2022-04-14 RX ADMIN — ATORVASTATIN CALCIUM 40 MILLIGRAM(S): 80 TABLET, FILM COATED ORAL at 22:50

## 2022-04-14 RX ADMIN — Medication 50 MILLIGRAM(S): at 16:36

## 2022-04-14 RX ADMIN — GABAPENTIN 100 MILLIGRAM(S): 400 CAPSULE ORAL at 22:50

## 2022-04-14 RX ADMIN — Medication 50 MILLIGRAM(S): at 17:10

## 2022-04-14 RX ADMIN — Medication 145 MILLIGRAM(S): at 11:12

## 2022-04-14 RX ADMIN — INSULIN GLARGINE 22 UNIT(S): 100 INJECTION, SOLUTION SUBCUTANEOUS at 22:50

## 2022-04-14 RX ADMIN — Medication 50 MILLIGRAM(S): at 22:50

## 2022-04-14 RX ADMIN — Medication 5: at 16:37

## 2022-04-14 RX ADMIN — GABAPENTIN 100 MILLIGRAM(S): 400 CAPSULE ORAL at 16:37

## 2022-04-14 RX ADMIN — Medication 75 MICROGRAM(S): at 05:56

## 2022-04-14 RX ADMIN — Medication 6: at 11:28

## 2022-04-14 RX ADMIN — GABAPENTIN 100 MILLIGRAM(S): 400 CAPSULE ORAL at 05:53

## 2022-04-14 RX ADMIN — SODIUM CHLORIDE 75 MILLILITER(S): 9 INJECTION, SOLUTION INTRAVENOUS at 14:05

## 2022-04-14 RX ADMIN — IOHEXOL 30 MILLILITER(S): 300 INJECTION, SOLUTION INTRAVENOUS at 14:01

## 2022-04-14 RX ADMIN — PANTOPRAZOLE SODIUM 40 MILLIGRAM(S): 20 TABLET, DELAYED RELEASE ORAL at 11:12

## 2022-04-14 RX ADMIN — LOSARTAN POTASSIUM 50 MILLIGRAM(S): 100 TABLET, FILM COATED ORAL at 05:53

## 2022-04-14 NOTE — PROGRESS NOTE ADULT - SUBJECTIVE AND OBJECTIVE BOX
Patient is a 76y old  Female who presents with a chief complaint of Symptomatic anemia (2022 13:41)    OVERNIGHT EVENTS: no acute changes    REVIEW OF SYSTEMS:  CONSTITUTIONAL: No fever, chills  ENMT:  No difficulty hearing, no change in vision  NECK: No pain or stiffness  RESPIRATORY: No cough, SOB  CARDIOVASCULAR: No chest pain, palpitations  GASTROINTESTINAL: No abdominal pain. No nausea, vomiting, or diarrhea  GENITOURINARY: No dysuria  NEUROLOGICAL: No HA  SKIN: No itching, burning, rashes, or lesions   MUSCULOSKELETAL: No joint pain or swelling; No muscle, back, or extremity pain  HEME/LYMPH: No easy bruising, or bleeding gums    T(C): 36.8 (22 @ 13:05), Max: 36.8 (22 @ 05:30)  HR: 70 (22 @ 13:05) (65 - 74)  BP: 133/46 (22 @ 13:05) (128/48 - 151/61)  RR: 17 (22 @ 13:05) (16 - 17)  SpO2: 98% (22 @ 13:05) (94% - 100%)  Wt(kg): --Vital Signs Last 24 Hrs  T(C): 36.8 (2022 13:05), Max: 36.8 (2022 05:30)  T(F): 98.2 (2022 13:05), Max: 98.2 (2022 05:30)  HR: 70 (2022 13:05) (65 - 74)  BP: 133/46 (2022 13:05) (128/48 - 151/61)  BP(mean): 67 (2022 13:05) (67 - 69)  RR: 17 (2022 13:05) (16 - 17)  SpO2: 98% (2022 13:05) (94% - 100%)    MEDICATIONS  (STANDING):  atorvastatin 40 milliGRAM(s) Oral at bedtime  fenofibrate Tablet 145 milliGRAM(s) Oral daily  gabapentin 100 milliGRAM(s) Oral three times a day  hydrALAZINE 50 milliGRAM(s) Oral three times a day  insulin glargine Injectable (LANTUS) 15 Unit(s) SubCutaneous at bedtime  insulin lispro (ADMELOG) corrective regimen sliding scale   SubCutaneous three times a day before meals  insulin lispro (ADMELOG) corrective regimen sliding scale   SubCutaneous at bedtime  lactated ringers. 1000 milliLiter(s) (75 mL/Hr) IV Continuous <Continuous>  levothyroxine 75 MICROGram(s) Oral daily  losartan 50 milliGRAM(s) Oral daily  metoprolol tartrate 50 milliGRAM(s) Oral two times a day  pantoprazole  Injectable 40 milliGRAM(s) IV Push daily    MEDICATIONS  (PRN):  benzocaine 15 mG/menthol 3.6 mG Lozenge 1 Lozenge Oral three times a day PRN Sore Throat    PHYSICAL EXAM:  GENERAL: well-appearing, NAD  EYES: clear conjunctiva  ENMT: Moist mucous membranes  NECK: Supple, No JVD  CHEST/LUNG: dec breath sounds at bases  HEART: S1, S2, Regular rate and rhythm  ABDOMEN: Soft, Nontender, Nondistended; Bowel sounds present  NEURO: Alert awake  EXTREMITIES: No LE edema, no calf tenderness  SKIN: No rashes or lesions    Consultant(s) Notes Reviewed:  [x ] YES  [ ] NO  Care Discussed with Consultants/Other Providers [ x] YES  [ ] NO    LABS:                        8.5    3.50  )-----------( 285      ( 2022 07:23 )             27.7         138  |  104  |  15  ----------------------------<  213<H>  4.4   |  28  |  1.33<H>    Ca    9.0      2022 07:23    TPro  6.9  /  Alb  3.6  /  TBili  0.3  /  DBili  x   /  AST  8<L>  /  ALT  25  /  AlkPhos  32<L>        CAPILLARY BLOOD GLUCOSE      POCT Blood Glucose.: 415 mg/dL (2022 11:25)  POCT Blood Glucose.: 401 mg/dL (2022 11:23)  POCT Blood Glucose.: 272 mg/dL (2022 21:25)  POCT Blood Glucose.: 267 mg/dL (2022 16:29)            RADIOLOGY & ADDITIONAL TESTS:  < from: EGD-Colonoscopy (22 @ 08:15) >       EGD-Colonoscopy Report Date: 2022 8:15 AM     Patient Name: MALISSA CHAPARRO     MRN: [id number]     Account Number: [id number]    Gender: Female      (age): 1945 (76)     EGD Instrument(s): [id number] ([id number])([id number])    Colonoscopy Instrument(s): [id number] ([id number])([id number])        Attending/Fellow:     Alejandro Tapia         Technician:     Elyssa Viera RN             Procedure Room #: PROCEDURE ROOM 2    PROCEDURE ROOM 2        Nurse(s):     Maria T Vanegas RN                     History of Present Illness:     H&P was previously reviewed.             Administered Medications: As per Anesthesiology Record         EGD Indications: Anemia and GI bleeding    Colonoscopy Indications: Anemia and GI bleeding        General Procedure:    The procedure, indications, preparation and potential complications were    explained to the patient, who indicated understanding and signed the    corresponding consent forms. MAC was administered by anesthesiologist.    Continuous pulse oximetry and blood pressure monitoring were used throughout the    procedure. Supplemental oxygen was used.            EGDEGD Procedure:     Patient was placed in left lateral decubitus position. The endoscope was    introduced through mouth and advanced under direct visualization until second    part of the duodenum reached. Patient tolerance to the procedure was good. The    procedure was not difficult. Blood loss was none.        EGD Findings:     Esophagus Mucosa Erythema of the mucosa with no bleeding was noted in the    gastroesophageal junction.     Stomach Lumen A medium size hiatal hernia was seen. Retroflexion view in the    stomach confirmed the size and morphology of the hernia.     Mucosa Patchy erythema of the mucosa with no bleeding was noted in the antrum. A    cold forceps biopsy was performed for histology in the antrum.     Duodenum Mucosa Normal mucosa was noted in the whole examined duodenum.         EGD Impressions:      Erythema in the gastroesophageal junction.      HiatalHernia.      Erythema in the antrum. (Biopsy).      Normal mucosa in the whole examined duodenum.             EGD Limitations/Complications:     There were no apparent limitations or complications    Colonoscopy                  Colonoscopy Procedure:     This is a  average risk patient  undergoing diagnostic colonoscopy. The quality    of preparation was fair. Patient was placed in left lateral decubitus position.    The colonoscope was introduced through rectum and advanced under direct    visualization until cecum reached. The appendiceal orifice and the ileo-cecal    valve were identified. The colonoscope was retroflexed within the rectum.    Careful visualization was performed as the instrument was withdrawn. Patient    tolerance to the procedure was excellent. The procedure was not difficult.    Digital exam was normal. Blood loss was none.        Colonoscopy Findings:     Excavated lesions A few non-bleeding diverticula with small openings were seen    in the sigmoid colon. Diverticulosis appeared to be of mild severity.     Protruding lesions A single sessile 4 mm non-bleeding polyp of benign appearance    was found in the rectum. A single-piece polypectomy was performed using a cold    forceps. The polyp was completely removed.     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 Multiple cold forceps biopsies were performed for histology.     Medium non-bleeding internal hemorrhoids were noted.         Colonoscopy Impressions:      Mild diverticulosis of the sigmoid colon.      Polyp (4 mm) in the rectum. (Polypectomy).      Mass in the hepatic flexure. (Biopsy).      Internal hemorrhoids.         Colonoscopy Limitations/Complications:     There were no apparent limitations or complications        Plan: Await pathology results.    High Fiber Diet    CT-Scan of abdomen and pelvis    Surgical evaluation    Oncology evaluation    Protonix 40mg PO daily    Treat for H. Pylori if positive    Avoid NSAID    Anti reflux measure                     Alejandro Tapia      Version 1, Electronically signed on 2022 8:34:02 AM by Alejandro Tapia    < end of copied text >      Imaging Personally Reviewed:  [ ] YES  [ ] NO

## 2022-04-14 NOTE — PROGRESS NOTE ADULT - PROBLEM SELECTOR PLAN 3
pt p/w uncontrolled htn  now improving  continue hydralazine 50 mg TID  continue lopressor with parameters  continue home med losartan 50 mg daily  DM goal <140/90  monitor BP rate controlled  ekg- shows sinus rhythm  home xarelto currently on hold  continue metoprolol 50 mg BID with parameters

## 2022-04-14 NOTE — PROGRESS NOTE ADULT - PROBLEM SELECTOR PLAN 7
on SCD for DVT ppx  on PPI for GI ppx Pt has h/o hypothyroidism  continue home dose levothyroxine 75mcg

## 2022-04-14 NOTE — PROGRESS NOTE ADULT - PROBLEM SELECTOR PLAN 2
colonoscopy reveal mass near hepatic flexure  surgery consulted  f/u CT abd and chest  plan as above

## 2022-04-14 NOTE — PROGRESS NOTE ADULT - PROBLEM SELECTOR PLAN 5
A1C 7.5   at home on levemir, metformin and januvia  hold home med while inpatient  FS ACHS  glucose 400s  continue ISS  change to lantus 22 u and 7 unit TID before meals  glucose goal 140-180  diabetic diet

## 2022-04-14 NOTE — PROGRESS NOTE ADULT - PROBLEM SELECTOR PLAN 4
A1C 7.5   at home on levemir, metformin and januvia  hold home med while inpatient  glucose 200-300s  FS ACHS  continue ISS  start lantus 15 units daily  glucose goal 140-180  diabetic diet A1C 7.5   at home on levemir, metformin and januvia  hold home med while inpatient  FS ACHS  glucose 400s  continue ISS  change to lantus 22 u and 7 unit TID before meals  glucose goal 140-180  diabetic diet pt p/w uncontrolled htn  now improving  continue hydralazine 50 mg TID  continue lopressor with parameters  continue home med losartan 50 mg daily  DM goal <140/90  monitor BP

## 2022-04-14 NOTE — PROGRESS NOTE ADULT - PROBLEM SELECTOR PLAN 5
Pt has HLD  started on home meds of atorvastatin and fenofibrate A1C 7.5   at home on levemir, metformin and januvia  hold home med while inpatient  FS ACHS  glucose 400s  continue ISS  change to lantus 22 u and 7 unit TID before meals  glucose goal 140-180  diabetic diet

## 2022-04-14 NOTE — DIETITIAN INITIAL EVALUATION ADULT - OTHER INFO
Patient from home with walker. Visited pt. alert but weak, offered  phone #ID [phone], declined, stated in English "I have a cold & still coughing", requesting "meds", referred RN. Also pt. was able to state having poor po inake  Patient from home with walker. Visited pt. alert but weak, offered  phone #ID [phone], declined, stated in English "I have a cold & still coughing", requesting "meds", referred RN. Also pt. was able to stated with "no appetite " & "always tired" at home, attempted to obtained food preferences, provided "few choices", encourage po intake w/meal set up, observed breakfast tray at beside, intake >50% & tolerating, s/p EGD w/colonoscopy in am, GI team following, d/w NP recommend to add consistent carbohydrate to diet as ordered.  Patient from home with walker. Visited pt. alert but weak, offered  phone #ID [phone], declined, stated in English "I have a cold & still coughing", requesting "meds", referred RN. Also pt. was able to stated with "no appetite " & "always tired" at home, attempted to obtained food preferences, provided "few choices", encourage po intake w/meal set up, observed breakfast tray at beside, intake >50% & tolerating, s/p EGD w/colonoscopy in am, GI team following, d/w NP recommend to add consistent carbohydrate to diet as ordered. Heme/Oncology team following.

## 2022-04-14 NOTE — DIETITIAN INITIAL EVALUATION ADULT - NS FNS DIET ORDER
Diet, Consistent Carbohydrate w/Evening Snack:   High Fiber  DASH/TLC {Sodium & Cholesterol Restricted} (04-14-22 @ 13:10)

## 2022-04-14 NOTE — PROGRESS NOTE ADULT - PROBLEM SELECTOR PLAN 3
rate controlled  ekg- shows sinus rhythm  home xarelto currently on hold  continue metoprolol 50 mg BID with parameters

## 2022-04-14 NOTE — PROGRESS NOTE ADULT - ASSESSMENT
MALISSA CHAPARRO is a 76y Female who presents with a chief complaint of anemia.    Anemia  - Patient presented with severe anemia, now s/p PRBC  - Ferritin is low at 27 indicating iron deficiency. S/p IV iron sucrose 200 mg for 3 days.  - hgb now stable  -- likely due to erythematous GEJ and hepatic flexure mass    GEJ erythema and colon mass -- noted on EGD/colonoscopy  -- f/u bx results  -- surg eval noted  -- pls check CT c/a/p with PO and IV contrast. With mild TAYA at this time, may need renal eval for IV contrast dosing possibility    Atrial Fibrillation  - Continue to hold rivaroxaban given concern for acute gastrointestinal bleeding  - defer to GI and primary team    Will follow.

## 2022-04-14 NOTE — CONSULT NOTE ADULT - SUBJECTIVE AND OBJECTIVE BOX
HPI:  76 year old female with PMH CAD, DM, Afib on Xarelto, HTN, HLD, breast CA presented to the hospital with 2 weeks of dyspnea on exertion, fatigue and weakness and was found to be anemic. Patient was transfused 1 unit PRBC and responded appropriately. Patient was suspected to be anemic from possible GI bleed and underwent endoscopy and colonoscopy with GI. Colonoscopy revealed hepatic flexure lesion which was biopsied and surgical consult was called. She denies constipation, diarrhea, melena, or hematochezia, denies nausea, vomiting, abdominal pain, family history of colon cancers.     Pt is a 75 yo F from home, walks with a walker, with PMH of CAD (s/p CABG in 2016), Atrial Fibrillation (s/p PPM, on xarelto), Hypothyroidism HTN, HLD, DM, presents to the ED with complaints of fatiigue, weakness and SOB on exertion for 2 weeks. Pt said she has been feeling fatigue with her usual routine and generalized weakness wherein she is not moving around as much as before. This week she started having SOB on exertion whenever she walked couple of blocks. Today she went to her Hematologist Dr. Christopher's office and her hemoglobin was around 5 (baseline [id number]) according to patient and was sent here. No c/o chest pain, fever, headache, N/V, abdominal pain, urinary complaints. No recent travel/sickness/ change in meds.   (09 Apr 2022 00:09)    PAST MEDICAL & SURGICAL HISTORY:  Atrial fibrillation and flutter  CAD (coronary artery disease)  Hypertension  DM (diabetes mellitus)  Hypothyroidism  S/P CABG x 1  Cardiac pacemaker    Review of Systems: Contained within HPI     MEDICATIONS  (STANDING):  atorvastatin 40 milliGRAM(s) Oral at bedtime  fenofibrate Tablet 145 milliGRAM(s) Oral daily  gabapentin 100 milliGRAM(s) Oral three times a day  hydrALAZINE 50 milliGRAM(s) Oral three times a day  insulin glargine Injectable (LANTUS) 15 Unit(s) SubCutaneous at bedtime  insulin lispro (ADMELOG) corrective regimen sliding scale   SubCutaneous three times a day before meals  insulin lispro (ADMELOG) corrective regimen sliding scale   SubCutaneous at bedtime  iohexol 300 mG (iodine)/mL Oral Solution 30 milliLiter(s) Oral once  lactated ringers. 1000 milliLiter(s) (75 mL/Hr) IV Continuous <Continuous>  levothyroxine 75 MICROGram(s) Oral daily  losartan 50 milliGRAM(s) Oral daily  metoprolol tartrate 50 milliGRAM(s) Oral two times a day  pantoprazole  Injectable 40 milliGRAM(s) IV Push daily    MEDICATIONS  (PRN):  benzocaine 15 mG/menthol 3.6 mG Lozenge 1 Lozenge Oral three times a day PRN Sore Throat    Allergies  No Known Allergies    FAMILY HISTORY:    Vital Signs Last 24 Hrs  T(C): 36.8 (14 Apr 2022 13:05), Max: 37.1 (13 Apr 2022 13:55)  T(F): 98.2 (14 Apr 2022 13:05), Max: 98.8 (13 Apr 2022 13:55)  HR: 70 (14 Apr 2022 13:05) (65 - 74)  BP: 133/46 (14 Apr 2022 13:05) (128/48 - 154/54)  BP(mean): 67 (14 Apr 2022 13:05) (67 - 79)  RR: 17 (14 Apr 2022 13:05) (16 - 17)  SpO2: 98% (14 Apr 2022 13:05) (94% - 100%)    Physical Exam:  General:  Appears stated age, well-groomed, well-nourished, no distress  Eyes: EOMI  HENT:  WNL, no JVD  Chest: respirations nonlabored  Cardiovascular:  Regular rate & rhythm  Abdomen: mild distention, soft, nontender, mild distention. No scars noted  Extremities: no edema bilaterally  Skin: warm and dry  Musculoskeletal: no calf tenderness  Neuro:  Alert, oriented to time, place and person   Psych: normal affect    LABS:                        8.5    3.50  )-----------( 285      ( 14 Apr 2022 07:23 )             27.7     04-14    138  |  104  |  15  ----------------------------<  213<H>  4.4   |  28  |  1.33<H>    Ca    9.0      14 Apr 2022 07:23    TPro  6.9  /  Alb  3.6  /  TBili  0.3  /  DBili  x   /  AST  8<L>  /  ALT  25  /  AlkPhos  32<L>  04-14    RADIOLOGY & ADDITIONAL STUDIES:  CT scan of chest and abdomen pending    < from: EGD-Colonoscopy (04.14.22 @ 08:15) >  EGD Impressions:    Erythema in the gastroesophageal junction.    HiatalHernia.    Erythema in the antrum. (Biopsy).    Normal mucosa in the whole examined duodenum.   < end of copied text >    Colonoscopy Findings:   Excavated lesions A few non-bleeding diverticula with small openings were seen  in the sigmoid colon. Diverticulosis appeared to be of mild severity.   Protruding lesions A single sessile 4 mm non-bleeding polyp of benign appearance  was found in the rectum. A single-piece polypectomy was performed using a cold  forceps. The polyp was completely removed.  A large semi circumferential mass of malignant appearance was found in the  hepatic flexure. Multiple cold forceps biopsies were performed for histology.   Medium non-bleeding internal hemorrhoids were noted.     Colonoscopy Impressions:    Mild diverticulosis of the sigmoid colon.    Polyp (4 mm) in the rectum. (Polypectomy).    Mass in the hepatic flexure. (Biopsy).    Internal hemorrhoids.   < end of copied text >  
Pt known to our service, pt of Dr Rollins. Briefly, 75 yo F from home, walks with a walker, with PMH of CAD (s/p CABG in 2016), Atrial Fibrillation (s/p PPM, on xarelto), Hypothyroidism HTN, HLD, DM, presents to the ED with complaints of fatiigue, weakness and SOB on exertion for 2 weeks. Pt said she has been feeling fatigue with her usual routine and generalized weakness wherein she is not moving around as much as before. This week she started having SOB on exertion whenever she walked couple of blocks. Today she went to her Hematologist Dr. Christopher's office and her hemoglobin was around 5 (baseline [id number]) according to patient and was sent here. No c/o chest pain, fever, headache, N/V, abdominal pain, urinary complaints. No recent travel/sickness/ change in meds.          PAST MEDICAL & SURGICAL HISTORY:  Atrial fibrillation and flutter    CAD (coronary artery disease)    Hypertension    DM (diabetes mellitus)    Hypothyroidism    S/P CABG x 1    Cardiac pacemaker        FAMILY HISTORY:      Alochol: Denied  Smoking: Nonsmoker  Drug Use: Denied  Marital Status:         Allergies    No Known Allergies    Intolerances        MEDICATIONS  (STANDING):  atorvastatin 40 milliGRAM(s) Oral at bedtime  fenofibrate Tablet 145 milliGRAM(s) Oral daily  gabapentin 100 milliGRAM(s) Oral three times a day  hydrALAZINE 25 milliGRAM(s) Oral daily  insulin lispro (ADMELOG) corrective regimen sliding scale   SubCutaneous three times a day before meals  insulin lispro (ADMELOG) corrective regimen sliding scale   SubCutaneous at bedtime  levothyroxine 75 MICROGram(s) Oral daily  metoprolol tartrate 50 milliGRAM(s) Oral two times a day  pantoprazole  Injectable 40 milliGRAM(s) IV Push daily    MEDICATIONS  (PRN):      ROS  No fever, sweats, chills  No epistaxis, HA, sore throat  No CP, SOB, cough, sputum  No n/v/d, abd pain, melena, hematochezia  No edema  No rash  No anxiety  No back pain, joint pain  No bleeding, bruising  No dysuria, hematuria    T(C): 37.1 (04-09-22 @ 14:26), Max: 37.1 (04-08-22 @ 21:00)  HR: 72 (04-09-22 @ 16:33) (71 - 81)  BP: 140/51 (04-09-22 @ 16:33) (140/51 - 181/57)  RR: 16 (04-09-22 @ 16:33) (16 - 18)  SpO2: 100% (04-09-22 @ 16:33) (96% - 100%)  Wt(kg): --    PE  NAD  Awake, alert  Anicteric  limited 2/2 covid pandemic                          7.2    4.50  )-----------( 267      ( 09 Apr 2022 07:08 )             23.3       04-09    141  |  106  |  16  ----------------------------<  123<H>  4.0   |  27  |  1.14    Ca    9.0      09 Apr 2022 07:08  Phos  5.5     04-09  Mg     1.8     04-09    TPro  6.3  /  Alb  3.2<L>  /  TBili  0.2  /  DBili  x   /  AST  17  /  ALT  30  /  AlkPhos  27<L>  04-09  
[  ] STAT REQUEST              [ X ] ROUTINE REQUEST    Patient is a 76 year old female with symptomatic anemia, GI consulted to evaluate.       HPI:  Pt is a 76 year old female from home, walks with a walker, with past medical history significant for HTN, CAD (s/p CABG in 2016), Atrial Fibrillation (s/p PPM, on Xarelto, Hypothyroidism, HLD, DM, presented to emergency room with 2 weeks history of worsening fatigue and weakness associated with sever anemia.    Patient c/o constipation but denies abdominal pain, nausea, vomiting, hematemesis, hematochezia, melena, fever, chills, chest pain, cough, epistaxis, hemoptysis, hematuria, dysuria or diarrhea.          PAIN MANAGEMENT:  Pain Scale:                 0/10  Pain Location:        Prior Colonoscopy:  No prior colonoscopy      PAST MEDICAL HISTORY  Atrial fibrillation and flutter  CAD   Hypertension  DM   Hypothyroidism  Hyperlipidemia        PAST SURGICAL HISTORY  CABG x 1  Cardiac pacemaker        Allergies    No Known Allergies    Intolerances  None       MEDICATIONS  (STANDING):  atorvastatin 40 milliGRAM(s) Oral at bedtime  fenofibrate Tablet 145 milliGRAM(s) Oral daily  gabapentin 100 milliGRAM(s) Oral three times a day  hydrALAZINE 50 milliGRAM(s) Oral three times a day  insulin lispro (ADMELOG) corrective regimen sliding scale   SubCutaneous three times a day before meals  insulin lispro (ADMELOG) corrective regimen sliding scale   SubCutaneous at bedtime  levothyroxine 75 MICROGram(s) Oral daily  metoprolol tartrate 50 milliGRAM(s) Oral two times a day  pantoprazole  Injectable 40 milliGRAM(s) IV Push daily    MEDICATIONS  (PRN):  benzocaine 15 mG/menthol 3.6 mG Lozenge 1 Lozenge Oral three times a day PRN Sore Throat      SOCIAL HISTORY  Advanced Directives:       [ X ] Full Code       [  ] DNR  Marital Status:         [  ] M      [ X ] S      [  ] D       [  ] W  Children:       [ X ] Yes      [  ] No  Occupation:        [  ] Employed       [ X ] Unemployed       [  ] Retired  Diet:       [ X ] Regular       [  ] PEG feeding          [  ] NG tube feeding  Drug Use:           [ X ] Patient denied          [  ] Yes  Alcohol:           [ X ] No             [  ] Yes (socially)         [  ] Yes (chronic)  Tobacco:           [  ] Yes           [ X ] No      FAMILY HISTORY  [ X ] Heart Disease            [ X ] Diabetes             [ X ] HTN             [  ] Colon Cancer             [  ] Stomach Cancer              [  ] Pancreatic Cancer       VITAL SIGNS   Vital Signs Last 24 Hrs  T(C): 36.7 (12 Apr 2022 05:25), Max: 36.7 (11 Apr 2022 20:30)  T(F): 98.1 (12 Apr 2022 05:25), Max: 98.1 (12 Apr 2022 05:25)  HR: 69 (12 Apr 2022 05:25) (64 - 69)  BP: 179/66 (12 Apr 2022 05:25) (162/61 - 179/66)   RR: 17 (12 Apr 2022 05:25) (17 - 18)  SpO2: 99% (12 Apr 2022 05:25) (94% - 99%)        CBC Full  -  ( 12 Apr 2022 07:51 )  WBC Count : 3.94 K/uL  RBC Count : 2.95 M/uL  Hemoglobin : 8.2 g/dL  Hematocrit : 26.0 %  Platelet Count - Automated : 285 K/uL  Mean Cell Volume : 88.1 fl  Mean Cell Hemoglobin : 27.8 pg  Mean Cell Hemoglobin Concentration : 31.5 gm/dL  Auto Neutrophil # : x  Auto Lymphocyte # : x  Auto Monocyte # : x  Auto Eosinophil # : x  Auto Basophil # : x  Auto Neutrophil % : x  Auto Lymphocyte % : x  Auto Monocyte % : x  Auto Eosinophil % : x  Auto Basophil % : x      04-12    139  |  105  |  17  ----------------------------<  274<H>  4.5   |  25  |  1.23    Ca    9.2      12 Apr 2022 07:51  Phos  4.6     04-11  Mg     1.7     04-11    TPro  6.8  /  Alb  3.4<L>  /  TBili  0.3  /  DBili  x   /  AST  15  /  ALT  34  /  AlkPhos  33<L>  04-11      Iron with Total Binding Capacity (04.08.22 @ 19:09)   Iron - Total Binding Capacity.: 339 ug/dL   % Saturation, Iron: 32 %   Iron Total, Serum: 108 ug/dL   Unsaturated Iron Binding Capacity: 231 ug/dL

## 2022-04-14 NOTE — PROGRESS NOTE ADULT - ASSESSMENT
75 y/o female, from home, walks with a walker, with PMH of CAD (s/p CABG in 2016), Atrial Fibrillation (s/p PPM, on xarelto), Hypothyroidism HTN, HLD, DM, breast cancer s/p mastectomy (12 years ago, not on chemo) presents to the ED with complaints of fatigue weakness and SOB on exertion for 2 weeks. Admitted to medicine for symptomatic anemia requiring 1U PRBCs. Heme onc and GI Dr. Tapia following. EGD and colonoscopy revealed mass in hepatic flexure, surgery consulted. Pt is pending CT Chest and Abd for r/o metastasis. Pending pathology results from mass.     Pt is aox3, afebrile, denies any shortness of breath, chest pain, nausea, vomiting or any bloody bowel movements. daughter and patient both updated on results and current plan.  75 y/o female, from home, walks with a walker, with PMH of CAD (s/p CABG in 2016), Atrial Fibrillation (s/p PPM, on xarelto), Hypothyroidism HTN, HLD, DM, breast cancer s/p mastectomy (12 years ago, not on chemo) presents to the ED with complaints of fatigue weakness and SOB on exertion for 2 weeks. Admitted to medicine for symptomatic anemia requiring 1U PRBCs. Heme onc and GI Dr. Tapia following. Pt initially required cardiac clearance for anesthesia during EGD and colonoscopy, ekg nsr and troponin negative x2. Colonoscopy revealed mass in hepatic flexure, surgery consulted. Pt is pending CT Chest and Abd for r/o metastasis. Pending pathology results from mass.     Pt is aox3, afebrile, denies any shortness of breath, chest pain, nausea, vomiting or any bloody bowel movements. daughter and patient both updated on results and current plan.

## 2022-04-14 NOTE — PROGRESS NOTE ADULT - PROBLEM SELECTOR PLAN 8
F/U CT Abd  pending EGD / colonoscopy after cardiac workup   will likely d/c back home if stable on SCD for DVT ppx  on PPI for GI ppx

## 2022-04-14 NOTE — DIETITIAN INITIAL EVALUATION ADULT - DIET TYPE
DASH/TLC (sodium and cholesterol restricted diet)/high fiber/consistent carbohydrate (evening snack)

## 2022-04-14 NOTE — DIETITIAN INITIAL EVALUATION ADULT - PERTINENT MEDS FT
MEDICATIONS  (STANDING):  atorvastatin 40 milliGRAM(s) Oral at bedtime  fenofibrate Tablet 145 milliGRAM(s) Oral daily  gabapentin 100 milliGRAM(s) Oral three times a day  hydrALAZINE 50 milliGRAM(s) Oral three times a day  insulin glargine Injectable (LANTUS) 15 Unit(s) SubCutaneous at bedtime  insulin lispro (ADMELOG) corrective regimen sliding scale   SubCutaneous three times a day before meals  insulin lispro (ADMELOG) corrective regimen sliding scale   SubCutaneous at bedtime  iohexol 300 mG (iodine)/mL Oral Solution 30 milliLiter(s) Oral once  lactated ringers. 1000 milliLiter(s) (75 mL/Hr) IV Continuous <Continuous>  levothyroxine 75 MICROGram(s) Oral daily  losartan 50 milliGRAM(s) Oral daily  metoprolol tartrate 50 milliGRAM(s) Oral two times a day  pantoprazole  Injectable 40 milliGRAM(s) IV Push daily    MEDICATIONS  (PRN):  benzocaine 15 mG/menthol 3.6 mG Lozenge 1 Lozenge Oral three times a day PRN Sore Throat

## 2022-04-14 NOTE — PROGRESS NOTE ADULT - PROBLEM SELECTOR PLAN 6
Pt has h/o hypothyroidism  continue home dose levothyroxine 75mcg Pt has HLD  continue home meds of atorvastatin and fenofibrate

## 2022-04-14 NOTE — PROGRESS NOTE ADULT - PROBLEM SELECTOR PLAN 2
rate controlled  ekg- shows sinus rhythm  home xarelto currently on hold  continue metoprolol 50 mg BID with parameters colonoscopy reveal mass near hepatic flexure  surgery consulted  f/u CT abd and chest  plan as above

## 2022-04-14 NOTE — PROGRESS NOTE ADULT - PROBLEM SELECTOR PLAN 1
normochromic anemia   No h/o melena, hematochezia, hematuria or any other sources of bleed  xarelto currently on hold  s/p 1U PRBC  now stable   trend CBC   transfuse if hemoglobin <7g/dl  Heme/Onc Dr. Sandhu following

## 2022-04-14 NOTE — PROGRESS NOTE ADULT - SUBJECTIVE AND OBJECTIVE BOX
Pt had EGD and colonoscopy this am that found erythema of the GEJ and a mass at the hepatic flexure, both bx. Pt sleepy this am    MEDICATIONS  (STANDING):  atorvastatin 40 milliGRAM(s) Oral at bedtime  fenofibrate Tablet 145 milliGRAM(s) Oral daily  gabapentin 100 milliGRAM(s) Oral three times a day  hydrALAZINE 50 milliGRAM(s) Oral three times a day  insulin glargine Injectable (LANTUS) 22 Unit(s) SubCutaneous at bedtime  insulin lispro (ADMELOG) corrective regimen sliding scale   SubCutaneous three times a day before meals  insulin lispro (ADMELOG) corrective regimen sliding scale   SubCutaneous at bedtime  insulin lispro Injectable (ADMELOG) 7 Unit(s) SubCutaneous three times a day before meals  lactated ringers. 1000 milliLiter(s) (75 mL/Hr) IV Continuous <Continuous>  levothyroxine 75 MICROGram(s) Oral daily  losartan 50 milliGRAM(s) Oral daily  metoprolol tartrate 50 milliGRAM(s) Oral two times a day  pantoprazole  Injectable 40 milliGRAM(s) IV Push daily    MEDICATIONS  (PRN):  benzocaine 15 mG/menthol 3.6 mG Lozenge 1 Lozenge Oral three times a day PRN Sore Throat      ROS  UTO 2/2 lethargy    Vital Signs Last 24 Hrs  T(C): 36.8 (14 Apr 2022 13:05), Max: 36.8 (14 Apr 2022 05:30)  T(F): 98.2 (14 Apr 2022 13:05), Max: 98.2 (14 Apr 2022 05:30)  HR: 70 (14 Apr 2022 13:05) (65 - 71)  BP: 133/46 (14 Apr 2022 13:05) (128/48 - 151/61)  BP(mean): 67 (14 Apr 2022 13:05) (67 - 69)  RR: 17 (14 Apr 2022 13:05) (16 - 17)  SpO2: 98% (14 Apr 2022 13:05) (94% - 100%)    PE  NAD  asleep  limited 2/2 participation                          8.5    3.50  )-----------( 285      ( 14 Apr 2022 07:23 )             27.7       04-14    138  |  104  |  15  ----------------------------<  213<H>  4.4   |  28  |  1.33<H>    Ca    9.0      14 Apr 2022 07:23    TPro  6.9  /  Alb  3.6  /  TBili  0.3  /  DBili  x   /  AST  8<L>  /  ALT  25  /  AlkPhos  32<L>  04-14

## 2022-04-14 NOTE — CONSULT NOTE ADULT - ASSESSMENT
77 yo F from home, walks with a walker, with PMH of CAD (s/p CABG in 2016), Atrial Fibrillation (s/p PPM, on xarelto), Hypothyroidism HTN, HLD, DM, here with acute on chronic anemia    anemia -- ferritin 27, with iron def  -- hgb was nml in the 11s in the past  -- need to eval for GI loss, check FOBT  -- Dr Rollins requested for inpt GI eval, pls consult GI  -- hgb improved s/p transfusion  -- start venofer 200mg daily x3 for GERALDINE    CAD -- s/p CABG  -- would aim for higher hgb goal given CAD, goal hgb >8  -- monitor clinically    afib -- xarelto on hold given suspicion for GIB    D/w pt, will follow.
76 year old female with hepatic flexure mass seen on colonoscopy.   Presented with anemia possibly secondary to acute blood loss from GI bleed. Anemia improved s/p 1U PRBC.   PMH DM, FS currently 415. CAD, HTN, Afib on Eliquis which has been held  Patient does not appear to be currently bleeding    - CT scan chest, abdomen, pelvis pending: will follow up results  - follow up biopsy results from colonoscopy  - Please obtain tumor markers including CEA, CA 19-9, .   - From surgical standpoint no need to hold AC at this time, please evaluate from medical POV when safe to resume  - continue to trend H/H, and transfuse further if needed  - please provide medical and cardiac workup/clearance to determine if patient surgical candidate and stable for surgery  - continue medical management  - will follow  - discussed with Dr. Dee    
1. Symptomatic anemia  2. No evidence of acute GI bleeding  3. R/o chronic GI bleeding  4. R/o Colorectal neoplasm  5. R/o silent peptic ulcer disease  6. R/o AVM's    Suggestions:    1. Monitor H/H  2. Transfuse PRBC as needed  3. Protonix daily  4. EGD and colonoscopy  5. CT-Scan of abdomen and pelvis  6. Avoid NSAID  7. DVT prophylaxis

## 2022-04-14 NOTE — PROGRESS NOTE ADULT - SUBJECTIVE AND OBJECTIVE BOX
Patient is a 76y old  Female who presents with a chief complaint of Symptomatic anemia (2022 13:41)    OVERNIGHT EVENTS: no acute changes    REVIEW OF SYSTEMS:  CONSTITUTIONAL: No fever, chills  ENMT:  No difficulty hearing, no change in vision  NECK: No pain or stiffness  RESPIRATORY: No cough, SOB  CARDIOVASCULAR: No chest pain, palpitations  GASTROINTESTINAL: No abdominal pain. No nausea, vomiting, or diarrhea  GENITOURINARY: No dysuria  NEUROLOGICAL: No HA  SKIN: No itching, burning, rashes, or lesions   MUSCULOSKELETAL: No joint pain or swelling; No muscle, back, or extremity pain  HEME/LYMPH: No easy bruising, or bleeding gums    T(C): 36.8 (22 @ 13:05), Max: 36.8 (22 @ 05:30)  HR: 70 (22 @ 13:05) (65 - 74)  BP: 133/46 (22 @ 13:05) (128/48 - 151/61)  RR: 17 (22 @ 13:05) (16 - 17)  SpO2: 98% (22 @ 13:05) (94% - 100%)  Wt(kg): --Vital Signs Last 24 Hrs  T(C): 36.8 (2022 13:05), Max: 36.8 (2022 05:30)  T(F): 98.2 (2022 13:05), Max: 98.2 (2022 05:30)  HR: 70 (2022 13:05) (65 - 74)  BP: 133/46 (2022 13:05) (128/48 - 151/61)  BP(mean): 67 (2022 13:05) (67 - 69)  RR: 17 (2022 13:05) (16 - 17)  SpO2: 98% (2022 13:05) (94% - 100%)    MEDICATIONS  (STANDING):  atorvastatin 40 milliGRAM(s) Oral at bedtime  fenofibrate Tablet 145 milliGRAM(s) Oral daily  gabapentin 100 milliGRAM(s) Oral three times a day  hydrALAZINE 50 milliGRAM(s) Oral three times a day  insulin glargine Injectable (LANTUS) 15 Unit(s) SubCutaneous at bedtime  insulin lispro (ADMELOG) corrective regimen sliding scale   SubCutaneous three times a day before meals  insulin lispro (ADMELOG) corrective regimen sliding scale   SubCutaneous at bedtime  lactated ringers. 1000 milliLiter(s) (75 mL/Hr) IV Continuous <Continuous>  levothyroxine 75 MICROGram(s) Oral daily  losartan 50 milliGRAM(s) Oral daily  metoprolol tartrate 50 milliGRAM(s) Oral two times a day  pantoprazole  Injectable 40 milliGRAM(s) IV Push daily    MEDICATIONS  (PRN):  benzocaine 15 mG/menthol 3.6 mG Lozenge 1 Lozenge Oral three times a day PRN Sore Throat    PHYSICAL EXAM:  GENERAL: well-appearing, NAD  EYES: clear conjunctiva  ENMT: Moist mucous membranes  NECK: Supple, No JVD  CHEST/LUNG: Clear to auscultation bilaterally; No rales, rhonchi, wheezing, or rubs  HEART: S1, S2, Regular rate and rhythm  ABDOMEN: Soft, Nontender, Nondistended; Bowel sounds present  NEURO: Alert & Oriented X3  EXTREMITIES: No LE edema, no calf tenderness  SKIN: No rashes or lesions    Consultant(s) Notes Reviewed:  [x ] YES  [ ] NO  Care Discussed with Consultants/Other Providers [ x] YES  [ ] NO    LABS:                        8.5    3.50  )-----------( 285      ( 2022 07:23 )             27.7     -14    138  |  104  |  15  ----------------------------<  213<H>  4.4   |  28  |  1.33<H>    Ca    9.0      2022 07:23    TPro  6.9  /  Alb  3.6  /  TBili  0.3  /  DBili  x   /  AST  8<L>  /  ALT  25  /  AlkPhos  32<L>        CAPILLARY BLOOD GLUCOSE      POCT Blood Glucose.: 415 mg/dL (2022 11:25)  POCT Blood Glucose.: 401 mg/dL (2022 11:23)  POCT Blood Glucose.: 272 mg/dL (2022 21:25)  POCT Blood Glucose.: 267 mg/dL (2022 16:29)            RADIOLOGY & ADDITIONAL TESTS:  < from: EGD-Colonoscopy (22 @ 08:15) >       EGD-Colonoscopy Report Date: 2022 8:15 AM     Patient Name: MALISSA CHAPARRO     MRN: [id number]     Account Number: [id number]    Gender: Female      (age): 1945 (76)     EGD Instrument(s): [id number] ([id number])([id number])    Colonoscopy Instrument(s): [id number] 190 ([id number])([id number])        Attending/Fellow:     Alejandro Tapia         Technician:     Elyssa Viera RN             Procedure Room #: PROCEDURE ROOM 2    PROCEDURE ROOM 2        Nurse(s):     Maria T Vanegas RN                     History of Present Illness:     H&P was previously reviewed.             Administered Medications: As per Anesthesiology Record         EGD Indications: Anemia and GI bleeding    Colonoscopy Indications: Anemia and GI bleeding        General Procedure:    The procedure, indications, preparation and potential complications were    explained to the patient, who indicated understanding and signed the    corresponding consent forms. MAC was administered by anesthesiologist.    Continuous pulse oximetry and blood pressure monitoring were used throughout the    procedure. Supplemental oxygen was used.            EGDEGD Procedure:     Patient was placed in left lateral decubitus position. The endoscope was    introduced through mouth and advanced under direct visualization until second    part of the duodenum reached. Patient tolerance to the procedure was good. The    procedure was not difficult. Blood loss was none.        EGD Findings:     Esophagus Mucosa Erythema of the mucosa with no bleeding was noted in the    gastroesophageal junction.     Stomach Lumen A medium size hiatal hernia was seen. Retroflexion view in the    stomach confirmed the size and morphology of the hernia.     Mucosa Patchy erythema of the mucosa with no bleeding was noted in the antrum. A    cold forceps biopsy was performed for histology in the antrum.     Duodenum Mucosa Normal mucosa was noted in the whole examined duodenum.         EGD Impressions:      Erythema in the gastroesophageal junction.      HiatalHernia.      Erythema in the antrum. (Biopsy).      Normal mucosa in the whole examined duodenum.             EGD Limitations/Complications:     There were no apparent limitations or complications    Colonoscopy                  Colonoscopy Procedure:     This is a  average risk patient  undergoing diagnostic colonoscopy. The quality    of preparation was fair. Patient was placed in left lateral decubitus position.    The colonoscope was introduced through rectum and advanced under direct    visualization until cecum reached. The appendiceal orifice and the ileo-cecal    valve were identified. The colonoscope was retroflexed within the rectum.    Careful visualization was performed as the instrument was withdrawn. Patient    tolerance to the procedure was excellent. The procedure was not difficult.    Digital exam was normal. Blood loss was none.        Colonoscopy Findings:     Excavated lesions A few non-bleeding diverticula with small openings were seen    in the sigmoid colon. Diverticulosis appeared to be of mild severity.     Protruding lesions A single sessile 4 mm non-bleeding polyp of benign appearance    was found in the rectum. A single-piece polypectomy was performed using a cold    forceps. The polyp was completely removed.     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 Multiple cold forceps biopsies were performed for histology.     Medium non-bleeding internal hemorrhoids were noted.         Colonoscopy Impressions:      Mild diverticulosis of the sigmoid colon.      Polyp (4 mm) in the rectum. (Polypectomy).      Mass in the hepatic flexure. (Biopsy).      Internal hemorrhoids.         Colonoscopy Limitations/Complications:     There were no apparent limitations or complications        Plan: Await pathology results.    High Fiber Diet    CT-Scan of abdomen and pelvis    Surgical evaluation    Oncology evaluation    Protonix 40mg PO daily    Treat for H. Pylori if positive    Avoid NSAID    Anti reflux measure                     Alejandro Tapia      Version 1, Electronically signed on 2022 8:34:02 AM by Alejandro Tapia    < end of copied text >      Imaging Personally Reviewed:  [ ] YES  [ ] NO

## 2022-04-14 NOTE — PROGRESS NOTE ADULT - ASSESSMENT
75 y/o female, from home, walks with a walker, with PMH of CAD (s/p CABG in 2016), Atrial Fibrillation (s/p PPM, on xarelto), Hypothyroidism HTN, HLD, DM, breast cancer s/p mastectomy (12 years ago, not on chemo) presents to the ED with complaints of fatigue weakness and SOB on exertion for 2 weeks. Admitted to medicine for symptomatic anemia requiring 1U PRBCs. Heme onc and GI Dr. Tapia following. Pt initially required cardiac clearance for anesthesia during EGD and colonoscopy, ekg nsr and troponin negative x2. Colonoscopy revealed mass in hepatic flexure, surgery consulted. Pt is pending CT Chest and Abd for r/o metastasis. Pending pathology results from mass.     Pt is aox3, afebrile, denies any shortness of breath, chest pain, nausea, vomiting or any bloody bowel movements. daughter and patient both updated on results and current plan.

## 2022-04-14 NOTE — DIETITIAN INITIAL EVALUATION ADULT - PERTINENT LABORATORY DATA
04-14    138  |  104  |  15  ----------------------------<  213<H>  4.4   |  28  |  1.33<H>    Ca    9.0      14 Apr 2022 07:23    TPro  6.9  /  Alb  3.6  /  TBili  0.3  /  DBili  x   /  AST  8<L>  /  ALT  25  /  AlkPhos  32<L>  04-14  POCT Blood Glucose.: 415 mg/dL (04-14-22 @ 11:25)  A1C with Estimated Average Glucose Result: 7.5 % (04-09-22 @ 09:45)

## 2022-04-14 NOTE — PROGRESS NOTE ADULT - PROBLEM SELECTOR PLAN 4
pt p/w uncontrolled htn  now improving  continue hydralazine 50 mg TID  continue lopressor with parameters  continue home med losartan 50 mg daily  DM goal <140/90  monitor BP

## 2022-04-15 LAB
CANCER AG125 SERPL-ACNC: 78 U/ML — HIGH
CANCER AG19-9 SERPL-ACNC: 41 U/ML — HIGH
CEA SERPL-MCNC: 4.9 NG/ML — HIGH (ref 0–3.8)
GLUCOSE BLDC GLUCOMTR-MCNC: 229 MG/DL — HIGH (ref 70–99)
GLUCOSE BLDC GLUCOMTR-MCNC: 240 MG/DL — HIGH (ref 70–99)
GLUCOSE BLDC GLUCOMTR-MCNC: 256 MG/DL — HIGH (ref 70–99)
GLUCOSE BLDC GLUCOMTR-MCNC: 323 MG/DL — HIGH (ref 70–99)
GLUCOSE BLDC GLUCOMTR-MCNC: 341 MG/DL — HIGH (ref 70–99)
HCT VFR BLD CALC: 29.2 % — LOW (ref 34.5–45)
HGB BLD-MCNC: 8.8 G/DL — LOW (ref 11.5–15.5)
MCHC RBC-ENTMCNC: 27.3 PG — SIGNIFICANT CHANGE UP (ref 27–34)
MCHC RBC-ENTMCNC: 30.1 GM/DL — LOW (ref 32–36)
MCV RBC AUTO: 90.7 FL — SIGNIFICANT CHANGE UP (ref 80–100)
NRBC # BLD: 0 /100 WBCS — SIGNIFICANT CHANGE UP (ref 0–0)
PLATELET # BLD AUTO: 289 K/UL — SIGNIFICANT CHANGE UP (ref 150–400)
RBC # BLD: 3.22 M/UL — LOW (ref 3.8–5.2)
RBC # FLD: 16.4 % — HIGH (ref 10.3–14.5)
WBC # BLD: 7.47 K/UL — SIGNIFICANT CHANGE UP (ref 3.8–10.5)
WBC # FLD AUTO: 7.47 K/UL — SIGNIFICANT CHANGE UP (ref 3.8–10.5)

## 2022-04-15 PROCEDURE — 99232 SBSQ HOSP IP/OBS MODERATE 35: CPT

## 2022-04-15 RX ORDER — INSULIN GLARGINE 100 [IU]/ML
35 INJECTION, SOLUTION SUBCUTANEOUS AT BEDTIME
Refills: 0 | Status: DISCONTINUED | OUTPATIENT
Start: 2022-04-15 | End: 2022-04-16

## 2022-04-15 RX ORDER — DEXTROSE 50 % IN WATER 50 %
15 SYRINGE (ML) INTRAVENOUS ONCE
Refills: 0 | Status: DISCONTINUED | OUTPATIENT
Start: 2022-04-15 | End: 2022-04-16

## 2022-04-15 RX ORDER — ENOXAPARIN SODIUM 100 MG/ML
75 INJECTION SUBCUTANEOUS EVERY 12 HOURS
Refills: 0 | Status: DISCONTINUED | OUTPATIENT
Start: 2022-04-15 | End: 2022-04-16

## 2022-04-15 RX ORDER — SODIUM CHLORIDE 9 MG/ML
1000 INJECTION, SOLUTION INTRAVENOUS
Refills: 0 | Status: DISCONTINUED | OUTPATIENT
Start: 2022-04-15 | End: 2022-04-16

## 2022-04-15 RX ADMIN — Medication 50 MILLIGRAM(S): at 06:49

## 2022-04-15 RX ADMIN — Medication 2: at 12:51

## 2022-04-15 RX ADMIN — Medication 7 UNIT(S): at 08:02

## 2022-04-15 RX ADMIN — Medication 4: at 08:01

## 2022-04-15 RX ADMIN — GABAPENTIN 100 MILLIGRAM(S): 400 CAPSULE ORAL at 22:45

## 2022-04-15 RX ADMIN — LOSARTAN POTASSIUM 50 MILLIGRAM(S): 100 TABLET, FILM COATED ORAL at 06:50

## 2022-04-15 RX ADMIN — Medication 75 MICROGRAM(S): at 06:49

## 2022-04-15 RX ADMIN — Medication 50 MILLIGRAM(S): at 22:45

## 2022-04-15 RX ADMIN — ENOXAPARIN SODIUM 75 MILLIGRAM(S): 100 INJECTION SUBCUTANEOUS at 17:38

## 2022-04-15 RX ADMIN — Medication 4: at 16:35

## 2022-04-15 RX ADMIN — Medication 7 UNIT(S): at 12:51

## 2022-04-15 RX ADMIN — PANTOPRAZOLE SODIUM 40 MILLIGRAM(S): 20 TABLET, DELAYED RELEASE ORAL at 14:02

## 2022-04-15 RX ADMIN — Medication 50 MILLIGRAM(S): at 14:02

## 2022-04-15 RX ADMIN — ATORVASTATIN CALCIUM 40 MILLIGRAM(S): 80 TABLET, FILM COATED ORAL at 22:45

## 2022-04-15 RX ADMIN — GABAPENTIN 100 MILLIGRAM(S): 400 CAPSULE ORAL at 14:03

## 2022-04-15 RX ADMIN — GABAPENTIN 100 MILLIGRAM(S): 400 CAPSULE ORAL at 06:49

## 2022-04-15 RX ADMIN — Medication 145 MILLIGRAM(S): at 12:50

## 2022-04-15 RX ADMIN — Medication 50 MILLIGRAM(S): at 17:38

## 2022-04-15 RX ADMIN — SODIUM CHLORIDE 75 MILLILITER(S): 9 INJECTION, SOLUTION INTRAVENOUS at 22:46

## 2022-04-15 RX ADMIN — Medication 7 UNIT(S): at 16:36

## 2022-04-15 RX ADMIN — INSULIN GLARGINE 35 UNIT(S): 100 INJECTION, SOLUTION SUBCUTANEOUS at 22:45

## 2022-04-15 NOTE — PROGRESS NOTE ADULT - SUBJECTIVE AND OBJECTIVE BOX
CAse was discussed with Dr Dee. He examined pt and reviewed chart. He was available to discuss case with pt's family today but daughter had stepped out and unable to reach her.  Surgery RAMA funez later called daughter and discussed recommended plan with her and patient.  Explained to daughter Shira at [phone]  that pt will need partial colon resection for the mass noted on colonoscopy.  Pt is surgically stable with no evidence of obstruction, and no GI bleeding.  Recommend pt to follow up in office with Dr Dee in 10-14 days and will schedule as OP for same day admit surgery.  Please include Dr Dee's office info from [place] discharge summary.  Daughter also given number for Dr Dee's office to schedule appt.

## 2022-04-15 NOTE — PROGRESS NOTE ADULT - PROBLEM SELECTOR PLAN 2
colonoscopy reveal mass near hepatic flexure  CT Chest and A/P showed no additional metastasis  surg onc f/u

## 2022-04-15 NOTE — PROGRESS NOTE ADULT - ASSESSMENT
77 y/o female, from home, walks with a walker, with PMH of CAD (s/p CABG in 2016), Atrial Fibrillation (s/p PPM, on xarelto), Hypothyroidism HTN, HLD, DM, breast cancer s/p mastectomy (12 years ago, not on chemo) presents to the ED with complaints of fatigue weakness and SOB on exertion for 2 weeks. Admitted to medicine for symptomatic anemia requiring 1U PRBCs. Heme onc and GI Dr. Tapia following. Pt initially required cardiac workup for anesthesia during EGD and colonoscopy, ekg nsr and troponin negative x2. Colonoscopy revealed mass in hepatic flexure, surgery Dr. Dee consulted. Pending pathology results from mass. CT chest and A/P showed no metastasis.     Pt is aox3, afebrile, reports only mild abdominal pain. Pt is a possible surgical candidate. Pt awaiting additional cardiac clearance from Cardiology Dr. Sharif. Endocrine Dr. Barfield consulted for elevated glucose.

## 2022-04-15 NOTE — PROGRESS NOTE ADULT - ASSESSMENT
1. Symptomatic anemia  2. No evidence of acute GI bleeding  3. S/p EGD and colonoscopy  4. Hepatic flexure mass  5. Gastritis    Suggestions:    1. Monitor H/H  2. Transfuse PRBC as needed  3. Protonix daily  4. Follow up path  5. Surgical follow up  6. Oncology evaluation  7. Avoid NSAID  8. DVT prophylaxis

## 2022-04-15 NOTE — PROGRESS NOTE ADULT - SUBJECTIVE AND OBJECTIVE BOX
Patient is a 76y old  Female who presents with a chief complaint of Symptomatic anemia (15 Apr 2022 13:29)    OVERNIGHT EVENTS: no acute changes    REVIEW OF SYSTEMS:  used Carly #[id number]  CONSTITUTIONAL: No fever, chills  NECK: No pain or stiffness  RESPIRATORY: No cough, SOB  CARDIOVASCULAR: No chest pain, palpitations  GASTROINTESTINAL: +mild abdominal pain. No nausea, vomiting, diarrhea  GENITOURINARY: No dysuria  NEUROLOGICAL: No HA  SKIN: No itching, burning, rashes, or lesions   MUSCULOSKELETAL: No joint pain or swelling; No muscle, back, or extremity pain    T(C): 37.3 (04-15-22 @ 14:00), Max: 37.3 (04-15-22 @ 14:00)  HR: 69 (04-15-22 @ 14:00) (69 - 83)  BP: 136/69 (04-15-22 @ 14:00) (124/64 - 156/62)  RR: 16 (04-15-22 @ 14:00) (16 - 17)  SpO2: 95% (04-15-22 @ 14:00) (94% - 97%)  Wt(kg): --Vital Signs Last 24 Hrs  T(C): 37.3 (15 Apr 2022 14:00), Max: 37.3 (15 Apr 2022 14:00)  T(F): 99.2 (15 Apr 2022 14:00), Max: 99.2 (15 Apr 2022 14:00)  HR: 69 (15 Apr 2022 14:00) (69 - 83)  BP: 136/69 (15 Apr 2022 14:00) (124/64 - 156/62)  BP(mean): --  RR: 16 (15 Apr 2022 14:00) (16 - 17)  SpO2: 95% (15 Apr 2022 14:00) (94% - 97%)    MEDICATIONS  (STANDING):  atorvastatin 40 milliGRAM(s) Oral at bedtime  enoxaparin Injectable 75 milliGRAM(s) SubCutaneous every 12 hours  fenofibrate Tablet 145 milliGRAM(s) Oral daily  gabapentin 100 milliGRAM(s) Oral three times a day  hydrALAZINE 50 milliGRAM(s) Oral three times a day  insulin glargine Injectable (LANTUS) 22 Unit(s) SubCutaneous at bedtime  insulin lispro (ADMELOG) corrective regimen sliding scale   SubCutaneous three times a day before meals  insulin lispro (ADMELOG) corrective regimen sliding scale   SubCutaneous at bedtime  insulin lispro Injectable (ADMELOG) 7 Unit(s) SubCutaneous three times a day before meals  lactated ringers. 1000 milliLiter(s) (75 mL/Hr) IV Continuous <Continuous>  levothyroxine 75 MICROGram(s) Oral daily  losartan 50 milliGRAM(s) Oral daily  metoprolol tartrate 50 milliGRAM(s) Oral two times a day  pantoprazole  Injectable 40 milliGRAM(s) IV Push daily    MEDICATIONS  (PRN):  benzocaine 15 mG/menthol 3.6 mG Lozenge 1 Lozenge Oral three times a day PRN Sore Throat    PHYSICAL EXAM:  GENERAL: well-appearing, NAD  EYES: clear conjunctiva  ENMT: Moist mucous membranes  NECK: Supple, No JVD  CHEST/LUNG: Clear to auscultation bilaterally; No rales, rhonchi, wheezing, or rubs  HEART: S1, S2, Regular rate and rhythm  ABDOMEN: Soft, Nontender, Nondistended; Bowel sounds present  NEURO: Alert & Oriented X3  EXTREMITIES: No LE edema, no calf tenderness  SKIN: No rashes or lesions    Consultant(s) Notes Reviewed:  [x ] YES  [ ] NO  Care Discussed with Consultants/Other Providers [ x] YES  [ ] NO    LABS:                        8.8    7.47  )-----------( 289      ( 15 Apr 2022 13:36 )             29.2     04-14    138  |  104  |  15  ----------------------------<  213<H>  4.4   |  28  |  1.33<H>    Ca    9.0      14 Apr 2022 07:23    TPro  6.9  /  Alb  3.6  /  TBili  0.3  /  DBili  x   /  AST  8<L>  /  ALT  25  /  AlkPhos  32<L>  04-14      CAPILLARY BLOOD GLUCOSE      POCT Blood Glucose.: 240 mg/dL (15 Apr 2022 12:46)  POCT Blood Glucose.: 256 mg/dL (15 Apr 2022 11:21)  POCT Blood Glucose.: 323 mg/dL (15 Apr 2022 07:36)  POCT Blood Glucose.: 201 mg/dL (14 Apr 2022 22:41)  POCT Blood Glucose.: 156 mg/dL (14 Apr 2022 21:03)  POCT Blood Glucose.: 373 mg/dL (14 Apr 2022 16:31)            RADIOLOGY & ADDITIONAL TESTS:  < from: CT Abdomen and Pelvis w/ Oral Cont and w/ IV Cont (04.14.22 @ 15:38) >  ACC: [id number] EXAM:  CT CHEST IC                        ACC: [id number] EXAM:  CT ABDOMEN AND PELVIS OC IC                          PROCEDURE DATE:  04/14/2022          INTERPRETATION:  CLINICAL INFORMATION: Known mass near hepatic flexure.   Historyof breast cancer 12 years prior    COMPARISON: None.    CONTRAST/COMPLICATIONS:  IV Contrast: IV contrast documented in associated exam (accession   [id number]), Omnipaque 350 (accession [id number])  90 cc administered   10 cc   discarded  Oral Contrast:NONE (accession [id number]), Omnipaque 300 (accession   [id number])  Complications: None reported at time of study completion    PROCEDURE:  CT of the Chest, Abdomen and Pelvis was performed.  Sagittal and coronal reformats were performed.    FINDINGS:  CHEST:  LUNGS AND LARGE AIRWAYS: Patent central airways. No pulmonary nodules.  PLEURA: No pleural effusion.  VESSELS: Within normal limits.  HEART: Sternotomy. Mild cardiomegaly. Pacemaker. Coronary artery   calcifications. No pericardial effusion.  MEDIASTINUM AND DENEEN: No lymphadenopathy.  CHEST WALL AND LOWER NECK: Small thyroid nodules and coarse   calcifications in the right lobe. The left lobe is not well visualized   and may be absent. Recommend nonemergent ultrasound for further   characterization.  Left mastectomy.  Small subcentimeter cervical lymph nodes.    ABDOMEN AND PELVIS:  LIVER: Within normal limits.  BILE DUCTS: Normal caliber.  GALLBLADDER: Contracted. Small gallstones.  SPLEEN: Within normal limits.  PANCREAS: Within normal limits.  ADRENALS: Within normal limits.  KIDNEYS/URETERS: Small Left renal cyst.    BLADDER: Within normal limits.  REPRODUCTIVE ORGANS: Uterus and adnexa within normal limits.    BOWEL: A known hepatic flexure lesion recently biopsied on colonoscopy is   not discretely visualized on this exam. There is thickening versus under   distention at the hepatic flexure. There are no inflammatory changes or   adjacent lymph nodes. No bowel obstruction. Appendix is normal.  PERITONEUM: No ascites.  VESSELS: Atherosclerotic changes.  RETROPERITONEUM/LYMPH NODES: No lymphadenopathy.  ABDOMINAL WALL: Minimal induration of the skin and fat just to the left   of the umbilicus, possibly an injection site.  BONES: Degenerative changes. Sternotomy. Changeslikely related to old   trauma at the left pubic symphysis. Marked compression deformity of the   L1 vertebral body of uncertain chronicity but appears chronic.    IMPRESSION:    A known hepatic flexure lesion recently biopsied on colonoscopy is not   discretely visualized on this exam. There is thickening versus under   distention at the hepatic flexure. There are no inflammatory changes or   adjacent lymph nodes. No evidence for distant metastases.      --- End of Report ---            SUMI WEINSTEIN MD; Attending Radiologist  This document has been electronically signed. Apr 14 2022  8:43PM    < end of copied text >      Imaging Personally Reviewed:  [ ] YES  [ ] NO

## 2022-04-15 NOTE — PROGRESS NOTE ADULT - GASTROINTESTINAL DETAILS
soft/nontender/no distention/no masses palpable/bowel sounds normal/no rebound tenderness/no guarding/no rigidity/no organomegaly
soft/nontender/no distention/no masses palpable/bowel sounds normal/no rebound tenderness/no guarding/no rigidity

## 2022-04-15 NOTE — CHART NOTE - NSCHARTNOTEFT_GEN_A_CORE
Received telephone call from day NP Vinnie Liu to change Lantus 22U to Lantus 35 Units which is pt's home dose and that endocrinologist Md Barfield will see pt in Am. Same was done.

## 2022-04-15 NOTE — PROGRESS NOTE ADULT - PROBLEM SELECTOR PLAN 5
A1C 7.5   at home on levemir, metformin and januvia  hold home med while inpatient  FS ACHS  glucose 400s  continue ISS  change to lantus 22 u and 7 unit TID before meals  glucose goal 140-180  diabetic diet  endocrine Dr. Barfield consulted

## 2022-04-15 NOTE — PROGRESS NOTE ADULT - SUBJECTIVE AND OBJECTIVE BOX
INTERVAL HPI/OVERNIGHT EVENTS:    Pt seen and examined at bedside. Offers no acute complaints at this time. Pt is NPO.    +BM/Flatus     Vital Signs Last 24 Hrs  T(C): 36.7 (15 Apr 2022 05:33), Max: 36.8 (14 Apr 2022 13:05)  T(F): 98 (15 Apr 2022 05:33), Max: 98.3 (14 Apr 2022 20:28)  HR: 83 (15 Apr 2022 05:33) (65 - 83)  BP: 156/62 (15 Apr 2022 05:33) (124/64 - 156/62)  BP(mean): 67 (14 Apr 2022 13:05) (67 - 67)  RR: 17 (15 Apr 2022 05:33) (16 - 17)  SpO2: 94% (15 Apr 2022 05:33) (94% - 100%)  I&O's Detail    pantoprazole  Injectable 40 milliGRAM(s) IV Push daily      Physical Exam  General: AAOx3, No acute distress  Skin: No jaundice, no icterus  Abdomen: soft,  nondistended, nontender, no rebound tenderness, no guarding, no palpable masses  Extremities: non edematous, no calf pain bilaterally        Labs:                        8.5    3.50  )-----------( 285      ( 14 Apr 2022 07:23 )             27.7     04-14    138  |  104  |  15  ----------------------------<  213<H>  4.4   |  28  |  1.33<H>    Ca    9.0      14 Apr 2022 07:23    TPro  6.9  /  Alb  3.6  /  TBili  0.3  /  DBili  x   /  AST  8<L>  /  ALT  25  /  AlkPhos  32<L>  04-14        RADIOLOGY & ADDITIONAL STUDIES:  < from: CT Abdomen and Pelvis w/ Oral Cont and w/ IV Cont (04.14.22 @ 15:38) >  FINDINGS:  CHEST:  LUNGS AND LARGE AIRWAYS: Patent central airways. No pulmonary nodules.  PLEURA: No pleural effusion.  VESSELS: Within normal limits.  HEART: Sternotomy. Mild cardiomegaly. Pacemaker. Coronary artery   calcifications. No pericardial effusion.  MEDIASTINUM AND DENEEN: No lymphadenopathy.  CHEST WALL AND LOWER NECK: Small thyroid nodules and coarse   calcifications in the right lobe. The left lobe is not well visualized   and may be absent. Recommend nonemergent ultrasound for further   characterization.  Left mastectomy.  Small subcentimeter cervical lymph nodes.    ABDOMEN AND PELVIS:  LIVER: Within normal limits.  BILE DUCTS: Normal caliber.  GALLBLADDER: Contracted. Small gallstones.  SPLEEN: Within normal limits.  PANCREAS: Within normal limits.  ADRENALS: Within normal limits.  KIDNEYS/URETERS: Small Left renal cyst.    BLADDER: Within normal limits.  REPRODUCTIVE ORGANS: Uterus and adnexa within normal limits.    BOWEL: A known hepatic flexure lesion recently biopsied on colonoscopy is   not discretely visualized on this exam. There is thickening versus under   distention at the hepatic flexure. There are no inflammatory changes or   adjacent lymph nodes. No bowel obstruction. Appendix is normal.  PERITONEUM: No ascites.  VESSELS: Atherosclerotic changes.  RETROPERITONEUM/LYMPH NODES: No lymphadenopathy.  ABDOMINAL WALL: Minimal induration of the skin and fat just to the left   of the umbilicus, possibly an injection site.  BONES: Degenerative changes. Sternotomy. Changeslikely related to old   trauma at the left pubic symphysis. Marked compression deformity of the   L1 vertebral body of uncertain chronicity but appears chronic.    IMPRESSION:    A known hepatic flexure lesion recently biopsied on colonoscopy is not   discretely visualized on this exam. There is thickening versus under   distention at the hepatic flexure. There are no inflammatory changes or   adjacent lymph nodes. No evidence for distant metastases.      --- End of Report ---      < end of copied text >   Patient's father notified and order is in Epic for the Ct Scan

## 2022-04-15 NOTE — PROGRESS NOTE ADULT - RS GEN PE MLT RESP DETAILS PC
airway patent/breath sounds equal/good air movement/respirations non-labored/clear to auscultation bilaterally/no rales/no rhonchi
airway patent/breath sounds equal/good air movement/respirations non-labored/clear to auscultation bilaterally/no rales/no rhonchi

## 2022-04-15 NOTE — PROGRESS NOTE ADULT - NEGATIVE GASTROINTESTINAL SYMPTOMS
no nausea/no vomiting/no diarrhea/no abdominal pain/no melena/no hematochezia/no steatorrhea/no jaundice/no hiccoughs
no nausea/no vomiting/no diarrhea/no abdominal pain/no melena/no hematochezia/no steatorrhea/no jaundice/no hiccoughs

## 2022-04-15 NOTE — PROGRESS NOTE ADULT - NEGATIVE ENMT SYMPTOMS
no hearing difficulty/no ear pain/no tinnitus/no vertigo/no nose bleeds/no gum bleeding/no dry mouth/no throat pain/no dysphagia
no hearing difficulty/no ear pain/no tinnitus/no vertigo/no nose bleeds/no gum bleeding/no dry mouth/no throat pain/no dysphagia

## 2022-04-15 NOTE — PROGRESS NOTE ADULT - PROBLEM SELECTOR PLAN 2
colonoscopy reveal mass near hepatic flexure  CT Chest and A/P showed no additional metastasis  surgery Dr. Dee following, possible surgery  cardiology Dr. Sharif consulted for cardiac clearance  f/u echo  plan as above

## 2022-04-15 NOTE — PROGRESS NOTE ADULT - SUBJECTIVE AND OBJECTIVE BOX
CHIEF COMPLAINT  Anemia    HISTORY OF PRESENT ILLNESS  MALISSA CHAPARRO is a 76y Female who presents with a chief complaint of anemia.    No acute events. No complaints.    REVIEW OF SYSTEMS  A complete review of systems was performed; negative except per HPI    PHYSICAL EXAM  T(C): 36.7 (04-15-22 @ 05:33), Max: 36.8 (04-14-22 @ 13:05)  HR: 83 (04-15-22 @ 05:33) (65 - 83)  BP: 156/62 (04-15-22 @ 05:33) (124/64 - 156/62)  RR: 17 (04-15-22 @ 05:33) (16 - 17)  SpO2: 94% (04-15-22 @ 05:33) (94% - 100%)  Constitutional: alert, awake, in no acute distress  Eyes: PERRL, EOMI  HEENT: normocephalic, atraumatic  Neck: supple, non-tender  Cardiovascular: normal perfusion, no peripheral edema  Respiratory: normal respiratory efforts; no increased use of accessory muscles  Gastrointestinal: soft, non-tender  Musculoskeletal: normal range of motion, no deformities noted  Neurological: alert, CN II to XI grossly intact  Skin: warm, dry    LABORATORY DATA                        8.5    3.50  )-----------( 285      ( 14 Apr 2022 07:23 )             27.7     04-14    138  |  104  |  15  ----------------------------<  213<H>  4.4   |  28  |  1.33<H>    Ca    9.0      14 Apr 2022 07:23    TPro  6.9  /  Alb  3.6  /  TBili  0.3  /  DBili  x   /  AST  8<L>  /  ALT  25  /  AlkPhos  32<L>  04-14     CHIEF COMPLAINT  Anemia    HISTORY OF PRESENT ILLNESS  MALISSA CHAPARRO is a 76y Female who presents with a chief complaint of anemia.    No acute events. She endorses mild abdominal pain.     REVIEW OF SYSTEMS  A complete review of systems was performed; negative except per HPI    PHYSICAL EXAM  T(C): 36.7 (04-15-22 @ 05:33), Max: 36.8 (04-14-22 @ 13:05)  HR: 83 (04-15-22 @ 05:33) (65 - 83)  BP: 156/62 (04-15-22 @ 05:33) (124/64 - 156/62)  RR: 17 (04-15-22 @ 05:33) (16 - 17)  SpO2: 94% (04-15-22 @ 05:33) (94% - 100%)  Constitutional: alert, awake, in no acute distress  Eyes: PERRL, EOMI  HEENT: normocephalic, atraumatic  Neck: supple, non-tender  Cardiovascular: normal perfusion, no peripheral edema  Respiratory: normal respiratory efforts; no increased use of accessory muscles  Gastrointestinal: soft, non-tender  Musculoskeletal: normal range of motion, no deformities noted  Neurological: alert, CN II to XI grossly intact  Skin: warm, dry    LABORATORY DATA                        8.5    3.50  )-----------( 285      ( 14 Apr 2022 07:23 )             27.7     04-14    138  |  104  |  15  ----------------------------<  213<H>  4.4   |  28  |  1.33<H>    Ca    9.0      14 Apr 2022 07:23    TPro  6.9  /  Alb  3.6  /  TBili  0.3  /  DBili  x   /  AST  8<L>  /  ALT  25  /  AlkPhos  32<L>  04-14

## 2022-04-15 NOTE — PROGRESS NOTE ADULT - NEGATIVE PSYCHIATRIC SYMPTOMS
no suicidal ideation/no depression/no anxiety/no insomnia/no memory loss/no paranoia
no suicidal ideation/no depression/no anxiety/no insomnia/no memory loss/no paranoia

## 2022-04-15 NOTE — PROGRESS NOTE ADULT - ASSESSMENT
75 y/o female, from home, walks with a walker, with PMH of CAD (s/p CABG in 2016), Atrial Fibrillation (s/p PPM, on xarelto), Hypothyroidism HTN, HLD, DM, breast cancer s/p mastectomy (12 years ago, not on chemo) presents to the ED with complaints of fatigue weakness and SOB on exertion for 2 weeks. Admitted to medicine for symptomatic anemia requiring 1U PRBCs. Heme onc and GI Dr. Tapia following. Pt initially required cardiac workup for anesthesia during EGD and colonoscopy, ekg nsr and troponin negative x2. Colonoscopy revealed mass in hepatic flexure, surgery Dr. Dee consulted. Pending pathology results from mass. CT chest and A/P showed no metastasis.     Pt is aox3, afebrile, reports only mild abdominal pain. Pt is a possible surgical candidate. Pt awaiting additional cardiac clearance from Cardiology Dr. Sharif. Endocrine Dr. Barfield consulted for elevated glucose.

## 2022-04-15 NOTE — PROGRESS NOTE ADULT - SUBJECTIVE AND OBJECTIVE BOX
[   ] ICU                                          [   ] CCU                                      [ X  ] Medical Floor    Patient is a 76 year old female with symptomatic anemia, GI consulted to evaluate.        Pt is a 76 year old female from home, walks with a walker, with past medical history significant for HTN, CAD (s/p CABG in 2016), Atrial Fibrillation (s/p PPM, on Xarelto, Hypothyroidism, HLD, DM, presented to emergency room with 2 weeks history of worsening fatigue and weakness associated with sever anemia.    Patient c/o constipation but denies abdominal pain, nausea, vomiting, hematemesis, hematochezia, melena, fever, chills, chest pain, cough, epistaxis, hemoptysis, hematuria, dysuria or diarrhea.        Patient appears comfortable. No new complaints reported, No abdominal pain, N/V, hematemesis, hematochezia, melena, fever, chills, chest pain, SOB, cough or diarrhea reported.      PAIN MANAGEMENT:  Pain Scale:                 0/10  Pain Location:        Prior Colonoscopy:  No prior colonoscopy      PAST MEDICAL HISTORY  Atrial fibrillation and flutter  CAD   Hypertension  DM   Hypothyroidism  Hyperlipidemia        PAST SURGICAL HISTORY  CABG x 1  Cardiac pacemaker        Allergies    No Known Allergies    Intolerances  None       MEDICATIONS  (STANDING):  atorvastatin 40 milliGRAM(s) Oral at bedtime  fenofibrate Tablet 145 milliGRAM(s) Oral daily  gabapentin 100 milliGRAM(s) Oral three times a day  hydrALAZINE 50 milliGRAM(s) Oral three times a day  insulin lispro (ADMELOG) corrective regimen sliding scale   SubCutaneous three times a day before meals  insulin lispro (ADMELOG) corrective regimen sliding scale   SubCutaneous at bedtime  levothyroxine 75 MICROGram(s) Oral daily  metoprolol tartrate 50 milliGRAM(s) Oral two times a day  pantoprazole  Injectable 40 milliGRAM(s) IV Push daily    MEDICATIONS  (PRN):  benzocaine 15 mG/menthol 3.6 mG Lozenge 1 Lozenge Oral three times a day PRN Sore Throat      SOCIAL HISTORY  Advanced Directives:       [ X ] Full Code       [  ] DNR  Marital Status:         [  ] M      [ X ] S      [  ] D       [  ] W  Children:       [ X ] Yes      [  ] No  Occupation:        [  ] Employed       [ X ] Unemployed       [  ] Retired  Diet:       [ X ] Regular       [  ] PEG feeding          [  ] NG tube feeding  Drug Use:           [ X ] Patient denied          [  ] Yes  Alcohol:           [ X ] No             [  ] Yes (socially)         [  ] Yes (chronic)  Tobacco:           [  ] Yes           [ X ] No      FAMILY HISTORY  [ X ] Heart Disease            [ X ] Diabetes             [ X ] HTN             [  ] Colon Cancer             [  ] Stomach Cancer              [  ] Pancreatic Cancer      VITALS  Vital Signs Last 24 Hrs  T(C): 36.7 (15 Apr 2022 05:33), Max: 36.8 (14 Apr 2022 20:28)  T(F): 98 (15 Apr 2022 05:33), Max: 98.3 (14 Apr 2022 20:28)  HR: 83 (15 Apr 2022 05:33) (73 - 83)  BP: 156/62 (15 Apr 2022 05:33) (124/64 - 156/62)   RR: 17 (15 Apr 2022 05:33) (17 - 17)  SpO2: 94% (15 Apr 2022 05:33) (94% - 97%)       MEDICATIONS  (STANDING):  atorvastatin 40 milliGRAM(s) Oral at bedtime  enoxaparin Injectable 75 milliGRAM(s) SubCutaneous every 12 hours  fenofibrate Tablet 145 milliGRAM(s) Oral daily  gabapentin 100 milliGRAM(s) Oral three times a day  hydrALAZINE 50 milliGRAM(s) Oral three times a day  insulin glargine Injectable (LANTUS) 22 Unit(s) SubCutaneous at bedtime  insulin lispro (ADMELOG) corrective regimen sliding scale   SubCutaneous three times a day before meals  insulin lispro (ADMELOG) corrective regimen sliding scale   SubCutaneous at bedtime  insulin lispro Injectable (ADMELOG) 7 Unit(s) SubCutaneous three times a day before meals  lactated ringers. 1000 milliLiter(s) (75 mL/Hr) IV Continuous <Continuous>  levothyroxine 75 MICROGram(s) Oral daily  losartan 50 milliGRAM(s) Oral daily  metoprolol tartrate 50 milliGRAM(s) Oral two times a day  pantoprazole  Injectable 40 milliGRAM(s) IV Push daily    MEDICATIONS  (PRN):  benzocaine 15 mG/menthol 3.6 mG Lozenge 1 Lozenge Oral three times a day PRN Sore Throat                            8.5    3.50  )-----------( 285      ( 14 Apr 2022 07:23 )             27.7       04-14    138  |  104  |  15  ----------------------------<  213<H>  4.4   |  28  |  1.33<H>    Ca    9.0      14 Apr 2022 07:23    TPro  6.9  /  Alb  3.6  /  TBili  0.3  /  DBili  x   /  AST  8<L>  /  ALT  25  /  AlkPhos  32<L>  04-14

## 2022-04-15 NOTE — PROGRESS NOTE ADULT - PROBLEM SELECTOR PLAN 3
rate controlled  ekg- shows sinus rhythm  home xarelto currently on hold  start lovenox 75 mg BID  continue metoprolol 50 mg BID with parameters

## 2022-04-15 NOTE — PROGRESS NOTE ADULT - SUBJECTIVE AND OBJECTIVE BOX
Patient is a 76y old  Female who presents with a chief complaint of Symptomatic anemia (15 Apr 2022 13:29)    OVERNIGHT EVENTS: pt seen and examined    REVIEW OF SYSTEMS:  used Carly #[id number]  CONSTITUTIONAL: No fever, chills  NECK: No pain or stiffness  RESPIRATORY: No cough, SOB  CARDIOVASCULAR: No chest pain, palpitations  GASTROINTESTINAL: +mild abdominal pain. No nausea, vomiting, diarrhea  GENITOURINARY: No dysuria  NEUROLOGICAL: No HA  SKIN: No itching, burning, rashes, or lesions   MUSCULOSKELETAL: No joint pain or swelling; No muscle, back, or extremity pain    MEDICATIONS  (STANDING):  atorvastatin 40 milliGRAM(s) Oral at bedtime  enoxaparin Injectable 75 milliGRAM(s) SubCutaneous every 12 hours  fenofibrate Tablet 145 milliGRAM(s) Oral daily  gabapentin 100 milliGRAM(s) Oral three times a day  hydrALAZINE 50 milliGRAM(s) Oral three times a day  insulin glargine Injectable (LANTUS) 22 Unit(s) SubCutaneous at bedtime  insulin lispro (ADMELOG) corrective regimen sliding scale   SubCutaneous three times a day before meals  insulin lispro (ADMELOG) corrective regimen sliding scale   SubCutaneous at bedtime  insulin lispro Injectable (ADMELOG) 7 Unit(s) SubCutaneous three times a day before meals  lactated ringers. 1000 milliLiter(s) (75 mL/Hr) IV Continuous <Continuous>  levothyroxine 75 MICROGram(s) Oral daily  losartan 50 milliGRAM(s) Oral daily  metoprolol tartrate 50 milliGRAM(s) Oral two times a day  pantoprazole  Injectable 40 milliGRAM(s) IV Push daily    MEDICATIONS  (PRN):  benzocaine 15 mG/menthol 3.6 mG Lozenge 1 Lozenge Oral three times a day PRN Sore Throat    Vital Signs Last 24 Hrs  T(C): 37.3 (04-15-22 @ 14:00), Max: 37.3 (04-15-22 @ 14:00)  T(F): 99.2 (04-15-22 @ 14:00), Max: 99.2 (04-15-22 @ 14:00)  HR: 73 (04-15-22 @ 17:40) (69 - 83)  BP: 154/66 (04-15-22 @ 17:40) (124/64 - 156/62)  BP(mean): --  RR: 16 (04-15-22 @ 14:00) (16 - 17)  SpO2: 95% (04-15-22 @ 14:00) (94% - 97%)      PHYSICAL EXAM:  GENERAL: well-appearing, NAD  EYES: clear conjunctiva  ENMT: Moist mucous membranes  NECK: Supple, No JVD  CHEST/LUNG: Clear to auscultation bilaterally; No rales, rhonchi, wheezing, or rubs  HEART: S1, S2, Regular rate and rhythm  ABDOMEN: Soft, Nontender, Nondistended; Bowel sounds present  NEURO: Alert & Oriented X3  EXTREMITIES: No LE edema, no calf tenderness  SKIN: No rashes or lesions  LABS:  04-14    138  |  104  |  15  ----------------------------<  213<H>  4.4   |  28  |  1.33<H>    Ca    9.0      14 Apr 2022 07:23    TPro  6.9  /  Alb  3.6  /  TBili  0.3  /  DBili      /  AST  8<L>  /  ALT  25  /  AlkPhos  32<L>  04-14    Creatinine Trend: 1.33 <--, 1.25 <--, 1.23 <--, 1.05 <--, 1.18 <--, 1.14 <--                        8.8    7.47  )-----------( 289      ( 15 Apr 2022 13:36 )             29.2     Urine Studies:            LIVER FUNCTIONS - ( 14 Apr 2022 07:23 )  Alb: 3.6 g/dL / Pro: 6.9 g/dL / ALK PHOS: 32 U/L / ALT: 25 U/L DA / AST: 8 U/L / GGT: x                       RADIOLOGY & ADDITIONAL TESTS:  < from: CT Abdomen and Pelvis w/ Oral Cont and w/ IV Cont (04.14.22 @ 15:38) >  ACC: [id number] EXAM:  CT CHEST IC                        ACC: [id number] EXAM:  CT ABDOMEN AND PELVIS OC IC                          PROCEDURE DATE:  04/14/2022          INTERPRETATION:  CLINICAL INFORMATION: Known mass near hepatic flexure.   Historyof breast cancer 12 years prior    COMPARISON: None.    CONTRAST/COMPLICATIONS:  IV Contrast: IV contrast documented in associated exam (accession   [id number]), Omnipaque 350 (accession [id number])  90 cc administered   10 cc   discarded  Oral Contrast:NONE (accession [id number]), Omnipaque 300 (accession   [id number])  Complications: None reported at time of study completion    PROCEDURE:  CT of the Chest, Abdomen and Pelvis was performed.  Sagittal and coronal reformats were performed.    FINDINGS:  CHEST:  LUNGS AND LARGE AIRWAYS: Patent central airways. No pulmonary nodules.  PLEURA: No pleural effusion.  VESSELS: Within normal limits.  HEART: Sternotomy. Mild cardiomegaly. Pacemaker. Coronary artery   calcifications. No pericardial effusion.  MEDIASTINUM AND DENEEN: No lymphadenopathy.  CHEST WALL AND LOWER NECK: Small thyroid nodules and coarse   calcifications in the right lobe. The left lobe is not well visualized   and may be absent. Recommend nonemergent ultrasound for further   characterization.  Left mastectomy.  Small subcentimeter cervical lymph nodes.    ABDOMEN AND PELVIS:  LIVER: Within normal limits.  BILE DUCTS: Normal caliber.  GALLBLADDER: Contracted. Small gallstones.  SPLEEN: Within normal limits.  PANCREAS: Within normal limits.  ADRENALS: Within normal limits.  KIDNEYS/URETERS: Small Left renal cyst.    BLADDER: Within normal limits.  REPRODUCTIVE ORGANS: Uterus and adnexa within normal limits.    BOWEL: A known hepatic flexure lesion recently biopsied on colonoscopy is   not discretely visualized on this exam. There is thickening versus under   distention at the hepatic flexure. There are no inflammatory changes or   adjacent lymph nodes. No bowel obstruction. Appendix is normal.  PERITONEUM: No ascites.  VESSELS: Atherosclerotic changes.  RETROPERITONEUM/LYMPH NODES: No lymphadenopathy.  ABDOMINAL WALL: Minimal induration of the skin and fat just to the left   of the umbilicus, possibly an injection site.  BONES: Degenerative changes. Sternotomy. Changeslikely related to old   trauma at the left pubic symphysis. Marked compression deformity of the   L1 vertebral body of uncertain chronicity but appears chronic.    IMPRESSION:    A known hepatic flexure lesion recently biopsied on colonoscopy is not   discretely visualized on this exam. There is thickening versus under   distention at the hepatic flexure. There are no inflammatory changes or   adjacent lymph nodes. No evidence for distant metastases.      --- End of Report ---            SUMI WEINSTEIN MD; Attending Radiologist  This document has been electronically signed. Apr 14 2022  8:43PM    < end of copied text >      Imaging Personally Reviewed:  [ ] YES  [ ] NO

## 2022-04-15 NOTE — PROGRESS NOTE ADULT - PROBLEM SELECTOR PROBLEM 1
Symptomatic anemia

## 2022-04-15 NOTE — PROGRESS NOTE ADULT - ASSESSMENT
MALISSA CHAPARRO is a 76y Female who presents with a chief complaint of anemia.    Iron Deficiency Anemia  - Patient presented with severe anemia requiring blood transfusions. Lab work showed iron deficiency; patient been given IV iron supplementation.  - Monitor CBC. Transfuse to hemoglobin goal of 7.  - Etiology likely gastrointestinal bleed.    Colon Mass  - Mass seen on colonoscopy at the hepatic flexure.   - Surgery consulted.  - Pending tumor markers.  - Follow-up pathology results.    Atrial Fibrillation  - If there is potential surgery planned for this hospitalization, can utilize enoxaparin. Otherwise given severe anemia from presumed gastrointestinal bleed will have to weigh risks vs benefits of near/intermediate term anticoagulation with mass. Defer to primary team/cardiology.    Will continue to follow.    Terry Sandhu MD  Hematology/Oncology  O: [phone] MALISSA CHAPARRO is a 76y Female who presents with a chief complaint of anemia.    Iron Deficiency Anemia  - Patient presented with severe anemia requiring blood transfusions. Lab work showed iron deficiency; patient been given IV iron supplementation.  - Monitor CBC. Transfuse to hemoglobin goal of 7.  - Etiology likely gastrointestinal bleed.    Colon Mass  - Mass seen on colonoscopy at the hepatic flexure.   - Surgery consulted.  - Pending tumor markers. CT imaging with no evidence of metastatic disease.  - Follow-up pathology results.    Atrial Fibrillation  - If there is potential surgery planned for this hospitalization, can utilize enoxaparin. Otherwise given severe anemia from presumed gastrointestinal bleed will have to weigh risks vs benefits of near/intermediate term anticoagulation with mass. Defer to primary team/cardiology.    Will continue to follow.    Terry Sandhu MD  Hematology/Oncology  O: [phone]

## 2022-04-15 NOTE — PROGRESS NOTE ADULT - PROBLEM SELECTOR PLAN 1
likely due to colonic mass  normochromic anemia   No h/o melena, hematochezia, hematuria or any other sources of bleed  xarelto currently on hold  s/p 1U PRBC  now stable   trend CBC   transfuse if hemoglobin <7g/dl  Heme/Onc Dr. Sandhu following

## 2022-04-15 NOTE — PROGRESS NOTE ADULT - ASSESSMENT
76 year old female with hepatic flexure mass seen on colonoscopy.   Anemia secondary to acute blood loss from GI bleed, s/p 1U PRBC.       -CT scan chest, abdomen, pelvis results noted   -F/u biopsy results from colonoscopy  -F/u tumor markers including CEA, CA 19-9, .   -From surgical standpoint no need to hold AC at this time, please evaluate from medical POV when safe to resume  -Trend H/H, and transfuse further if needed  -Medical and cardiac workup/clearance to determine if patient surgical candidate and stable for surgery  -Will follow

## 2022-04-15 NOTE — PROGRESS NOTE ADULT - PROBLEM SELECTOR PLAN 9
possible surgery, awaiting cardiac clearance
F/U CT Abd and chest
possible surgery, awaiting cardiac clearance
F/U CT Abd and chest

## 2022-04-16 ENCOUNTER — TRANSCRIPTION ENCOUNTER (OUTPATIENT)
Age: 77
End: 2022-04-16

## 2022-04-16 VITALS
SYSTOLIC BLOOD PRESSURE: 155 MMHG | TEMPERATURE: 99 F | RESPIRATION RATE: 18 BRPM | DIASTOLIC BLOOD PRESSURE: 66 MMHG | HEART RATE: 79 BPM | OXYGEN SATURATION: 96 %

## 2022-04-16 LAB
GLUCOSE BLDC GLUCOMTR-MCNC: 195 MG/DL — HIGH (ref 70–99)
GLUCOSE BLDC GLUCOMTR-MCNC: 233 MG/DL — HIGH (ref 70–99)
HCT VFR BLD CALC: 28.9 % — LOW (ref 34.5–45)
HGB BLD-MCNC: 8.6 G/DL — LOW (ref 11.5–15.5)
MCHC RBC-ENTMCNC: 26.8 PG — LOW (ref 27–34)
MCHC RBC-ENTMCNC: 29.8 GM/DL — LOW (ref 32–36)
MCV RBC AUTO: 90 FL — SIGNIFICANT CHANGE UP (ref 80–100)
NRBC # BLD: 0 /100 WBCS — SIGNIFICANT CHANGE UP (ref 0–0)
PLATELET # BLD AUTO: 289 K/UL — SIGNIFICANT CHANGE UP (ref 150–400)
RBC # BLD: 3.21 M/UL — LOW (ref 3.8–5.2)
RBC # FLD: 16 % — HIGH (ref 10.3–14.5)
WBC # BLD: 4.48 K/UL — SIGNIFICANT CHANGE UP (ref 3.8–10.5)
WBC # FLD AUTO: 4.48 K/UL — SIGNIFICANT CHANGE UP (ref 3.8–10.5)

## 2022-04-16 PROCEDURE — 80053 COMPREHEN METABOLIC PANEL: CPT

## 2022-04-16 PROCEDURE — 85025 COMPLETE CBC W/AUTO DIFF WBC: CPT

## 2022-04-16 PROCEDURE — 71260 CT THORAX DX C+: CPT

## 2022-04-16 PROCEDURE — 82378 CARCINOEMBRYONIC ANTIGEN: CPT

## 2022-04-16 PROCEDURE — 85730 THROMBOPLASTIN TIME PARTIAL: CPT

## 2022-04-16 PROCEDURE — 83010 ASSAY OF HAPTOGLOBIN QUANT: CPT

## 2022-04-16 PROCEDURE — 88312 SPECIAL STAINS GROUP 1: CPT

## 2022-04-16 PROCEDURE — 83735 ASSAY OF MAGNESIUM: CPT

## 2022-04-16 PROCEDURE — 83540 ASSAY OF IRON: CPT

## 2022-04-16 PROCEDURE — 80048 BASIC METABOLIC PNL TOTAL CA: CPT

## 2022-04-16 PROCEDURE — 85652 RBC SED RATE AUTOMATED: CPT

## 2022-04-16 PROCEDURE — 80061 LIPID PANEL: CPT

## 2022-04-16 PROCEDURE — 36415 COLL VENOUS BLD VENIPUNCTURE: CPT

## 2022-04-16 PROCEDURE — 82962 GLUCOSE BLOOD TEST: CPT

## 2022-04-16 PROCEDURE — 82728 ASSAY OF FERRITIN: CPT

## 2022-04-16 PROCEDURE — 83550 IRON BINDING TEST: CPT

## 2022-04-16 PROCEDURE — 83036 HEMOGLOBIN GLYCOSYLATED A1C: CPT

## 2022-04-16 PROCEDURE — 93005 ELECTROCARDIOGRAM TRACING: CPT

## 2022-04-16 PROCEDURE — 86901 BLOOD TYPING SEROLOGIC RH(D): CPT

## 2022-04-16 PROCEDURE — 84443 ASSAY THYROID STIM HORMONE: CPT

## 2022-04-16 PROCEDURE — 99232 SBSQ HOSP IP/OBS MODERATE 35: CPT

## 2022-04-16 PROCEDURE — 86880 COOMBS TEST DIRECT: CPT

## 2022-04-16 PROCEDURE — 86850 RBC ANTIBODY SCREEN: CPT

## 2022-04-16 PROCEDURE — 83615 LACTATE (LD) (LDH) ENZYME: CPT

## 2022-04-16 PROCEDURE — 74177 CT ABD & PELVIS W/CONTRAST: CPT

## 2022-04-16 PROCEDURE — 36430 TRANSFUSION BLD/BLD COMPNT: CPT

## 2022-04-16 PROCEDURE — 86803 HEPATITIS C AB TEST: CPT

## 2022-04-16 PROCEDURE — 86301 IMMUNOASSAY TUMOR CA 19-9: CPT

## 2022-04-16 PROCEDURE — 88305 TISSUE EXAM BY PATHOLOGIST: CPT

## 2022-04-16 PROCEDURE — 84484 ASSAY OF TROPONIN QUANT: CPT

## 2022-04-16 PROCEDURE — 87635 SARS-COV-2 COVID-19 AMP PRB: CPT

## 2022-04-16 PROCEDURE — 85385 FIBRINOGEN ANTIGEN: CPT

## 2022-04-16 PROCEDURE — 84100 ASSAY OF PHOSPHORUS: CPT

## 2022-04-16 PROCEDURE — 85045 AUTOMATED RETICULOCYTE COUNT: CPT

## 2022-04-16 PROCEDURE — 86923 COMPATIBILITY TEST ELECTRIC: CPT

## 2022-04-16 PROCEDURE — 83690 ASSAY OF LIPASE: CPT

## 2022-04-16 PROCEDURE — 86304 IMMUNOASSAY TUMOR CA 125: CPT

## 2022-04-16 PROCEDURE — 85027 COMPLETE CBC AUTOMATED: CPT

## 2022-04-16 PROCEDURE — 86900 BLOOD TYPING SEROLOGIC ABO: CPT

## 2022-04-16 PROCEDURE — 85610 PROTHROMBIN TIME: CPT

## 2022-04-16 PROCEDURE — 99285 EMERGENCY DEPT VISIT HI MDM: CPT

## 2022-04-16 PROCEDURE — 83605 ASSAY OF LACTIC ACID: CPT

## 2022-04-16 PROCEDURE — P9040: CPT

## 2022-04-16 RX ORDER — PANTOPRAZOLE SODIUM 20 MG/1
0 TABLET, DELAYED RELEASE ORAL
Qty: 0 | Refills: 0 | DISCHARGE

## 2022-04-16 RX ADMIN — Medication 50 MILLIGRAM(S): at 05:52

## 2022-04-16 RX ADMIN — Medication 7 UNIT(S): at 08:08

## 2022-04-16 RX ADMIN — GABAPENTIN 100 MILLIGRAM(S): 400 CAPSULE ORAL at 05:52

## 2022-04-16 RX ADMIN — LOSARTAN POTASSIUM 50 MILLIGRAM(S): 100 TABLET, FILM COATED ORAL at 05:52

## 2022-04-16 RX ADMIN — Medication 145 MILLIGRAM(S): at 12:07

## 2022-04-16 RX ADMIN — Medication 50 MILLIGRAM(S): at 13:24

## 2022-04-16 RX ADMIN — ENOXAPARIN SODIUM 75 MILLIGRAM(S): 100 INJECTION SUBCUTANEOUS at 05:52

## 2022-04-16 RX ADMIN — Medication 50 MILLIGRAM(S): at 05:53

## 2022-04-16 RX ADMIN — GABAPENTIN 100 MILLIGRAM(S): 400 CAPSULE ORAL at 13:23

## 2022-04-16 RX ADMIN — Medication 75 MICROGRAM(S): at 05:53

## 2022-04-16 RX ADMIN — Medication 2: at 12:06

## 2022-04-16 RX ADMIN — Medication 1: at 08:08

## 2022-04-16 RX ADMIN — PANTOPRAZOLE SODIUM 40 MILLIGRAM(S): 20 TABLET, DELAYED RELEASE ORAL at 12:08

## 2022-04-16 RX ADMIN — Medication 7 UNIT(S): at 12:07

## 2022-04-16 NOTE — PROGRESS NOTE ADULT - REASON FOR ADMISSION
Symptomatic anemia

## 2022-04-16 NOTE — PROGRESS NOTE ADULT - SUBJECTIVE AND OBJECTIVE BOX
Case was discussed with Dr Dee. Patient seen and examined this morning.  Discussed findings with patient.     Vital Signs Last 24 Hrs  T(C): 36.7 (16 Apr 2022 06:35), Max: 37.3 (15 Apr 2022 14:00)  T(F): 98 (16 Apr 2022 06:35), Max: 99.2 (15 Apr 2022 14:00)  HR: 76 (16 Apr 2022 06:35) (66 - 82)  BP: 159/63 (16 Apr 2022 06:35) (136/69 - 171/73)  BP(mean): --  RR: 18 (16 Apr 2022 06:35) (16 - 18)  SpO2: 97% (16 Apr 2022 06:35) (95% - 97%)    Physical:  GA: AAOx3, no acute distress  CVS: RRR. no murmurs, rubs or gallops  Pulm: bilateral breath sounds present.  normal inspiratory effort  Abd: soft, nontender, nondistended.                          8.6    4.48  )-----------( 289      ( 16 Apr 2022 10:10 )             28.9       A/P:    pt will need partial colon resection for the mass noted on colonoscopy.  Pt is surgically stable with no evidence of obstruction, and no GI bleeding.  Recommend pt to follow up in office with Dr Dee in 10-14 days and will schedule as OP for same day admit surgery.  Please include Dr Dee's office info from [place] discharge summary.

## 2022-04-16 NOTE — PROGRESS NOTE ADULT - PROVIDER SPECIALTY LIST ADULT
Surgery
Surgery
Heme/Onc
Internal Medicine
Surgery
Internal Medicine
Gastroenterology
Gastroenterology

## 2022-04-16 NOTE — DISCHARGE NOTE NURSING/CASE MANAGEMENT/SOCIAL WORK - PATIENT PORTAL LINK FT
You can access the FollowMyHealth Patient Portal offered by [facility] by registering at the following website: [email or web address]. By joining [facility]’s FollowMyHealth portal, you will also be able to view your health information using other applications (apps) compatible with our system.

## 2022-04-16 NOTE — DISCHARGE NOTE NURSING/CASE MANAGEMENT/SOCIAL WORK - NSDCPEFALRISK_GEN_ALL_CORE
For information on Fall & Injury Prevention, visit: [email or web address] OR  [email or web address] OR  https://www.cdc.gov/steadi/patient.html

## 2022-04-16 NOTE — DISCHARGE NOTE NURSING/CASE MANAGEMENT/SOCIAL WORK - NSTRANSFERDENTURES_GEN_A_NUR
Pt is here today at her 17w prenatal visit  Pt states fetal movement is absent   Pt is still having headaches but getting better, pt has MFM anatomy scan 4/18.
SUBJECTIVE:  Shoshana Barrientos is here for her return OB visit. She denies feeling fetal movement. She denies vaginal bleeding. She denies vaginal discharge. She denies leaking of fluid. She denies uterine cramping. She reports occasional nausea, no vomiting. Shoshana Barrientos reports well overall. Still occasional headaches but no further headaches with orgasms. No residual sx after having COVID. OBJECTIVE:  Blood pressure 108/69, pulse 86, weight 172 lb 8 oz (78.2 kg), last menstrual period 11/18/2021, not currently breastfeeding. TWG: -2 lb 8 oz (-1.134 kg)    Shoshana Barrientos has not received the COVID-19 vaccine    ASSESSMENT/PLAN:  IUP at 17w0d  Lazara Elias will watch for fetal movement.  EDC was established. Following LMP dating, early U/S declined    Labs were reviewed.  Declines genetics, AFP, and carrier screening    Weight gain guidelines was reviewed. Overweight: BMI 25-30: Gain 15-25 lb   Reviewed warning signs: cramping, acute abdominal pain, dysuria, fever/chills, abnormal vaginal discharge or leaking fluid, or vaginal bleeding.  [facility] referral in place for anatomy scan at 18-22 weeks. S=D  Normal pregnancy in multigravida, antepartum  · Anatomy scan 4/18/22   Self-Pay  · No early dating U/S  · Minimal labs  Rh negative state in antepartum period  · Antibody screen w/glucola and Rhogam at 28 weeks      The problem list was reviewed and updated with any new issues. Return in about 4 weeks (around 4/14/2022) for OB visit with Midwives then 4 weeks later for glucola. The patient, Alaina Shelby,  was seen with a total time spent of 25 minutes for the visit on this date of service by the [facility]  The time component involved both face-to-face (counseling and education) and non face-to-face time (care coordination), spent in determining the total time component.       Electronically signed by: ERVIN Thornton CNM
partial/upper/lower

## 2022-04-18 PROBLEM — E11.9 TYPE 2 DIABETES MELLITUS WITHOUT COMPLICATIONS: Chronic | Status: ACTIVE | Noted: 2022-04-09

## 2022-04-18 PROBLEM — I25.10 ATHEROSCLEROTIC HEART DISEASE OF NATIVE CORONARY ARTERY WITHOUT ANGINA PECTORIS: Chronic | Status: ACTIVE | Noted: 2022-04-09

## 2022-04-18 PROBLEM — I48.92 UNSPECIFIED ATRIAL FLUTTER: Chronic | Status: ACTIVE | Noted: 2022-04-09

## 2022-04-18 PROBLEM — E03.9 HYPOTHYROIDISM, UNSPECIFIED: Chronic | Status: ACTIVE | Noted: 2022-04-09

## 2022-04-18 PROBLEM — I10 ESSENTIAL (PRIMARY) HYPERTENSION: Chronic | Status: ACTIVE | Noted: 2022-04-09

## 2022-04-18 LAB — SURGICAL PATHOLOGY STUDY: SIGNIFICANT CHANGE UP

## 2022-04-20 PROBLEM — K63.89 MASS OF COLON: Status: ACTIVE | Noted: 2022-04-20

## 2022-04-20 PROBLEM — Z00.00 ENCOUNTER FOR PREVENTIVE HEALTH EXAMINATION: Status: ACTIVE | Noted: 2022-04-20

## 2022-04-27 ENCOUNTER — APPOINTMENT (OUTPATIENT)
Dept: SURGICAL ONCOLOGY | Facility: CLINIC | Age: 77
End: 2022-04-27
Payer: MEDICARE

## 2022-04-27 VITALS
WEIGHT: 164 LBS | OXYGEN SATURATION: 97 % | SYSTOLIC BLOOD PRESSURE: 110 MMHG | HEART RATE: 82 BPM | DIASTOLIC BLOOD PRESSURE: 64 MMHG | BODY MASS INDEX: 29.06 KG/M2 | HEIGHT: 63 IN

## 2022-04-27 VITALS — TEMPERATURE: 99 F

## 2022-04-27 DIAGNOSIS — K63.89 OTHER SPECIFIED DISEASES OF INTESTINE: ICD-10-CM

## 2022-04-27 LAB
BASOPHILS # BLD AUTO: 0.04 K/UL
BASOPHILS NFR BLD AUTO: 0.7 %
EOSINOPHIL # BLD AUTO: 0.06 K/UL
EOSINOPHIL NFR BLD AUTO: 1 %
HCT VFR BLD CALC: 28.3 %
HGB BLD-MCNC: 8.3 G/DL
IMM GRANULOCYTES NFR BLD AUTO: 0.3 %
LYMPHOCYTES # BLD AUTO: 1.53 K/UL
LYMPHOCYTES NFR BLD AUTO: 26.6 %
MAN DIFF?: NORMAL
MCHC RBC-ENTMCNC: 26.7 PG
MCHC RBC-ENTMCNC: 29.3 GM/DL
MCV RBC AUTO: 91 FL
MONOCYTES # BLD AUTO: 0.48 K/UL
MONOCYTES NFR BLD AUTO: 8.3 %
NEUTROPHILS # BLD AUTO: 3.63 K/UL
NEUTROPHILS NFR BLD AUTO: 63.1 %
PLATELET # BLD AUTO: 339 K/UL
RBC # BLD: 3.11 M/UL
RBC # FLD: 16.4 %
WBC # FLD AUTO: 5.76 K/UL

## 2022-04-27 PROCEDURE — 99214 OFFICE O/P EST MOD 30 MIN: CPT

## 2022-04-28 LAB
ALBUMIN SERPL ELPH-MCNC: 4.4 G/DL
ALP BLD-CCNC: 31 U/L
ALT SERPL-CCNC: 9 U/L
ANION GAP SERPL CALC-SCNC: 15 MMOL/L
AST SERPL-CCNC: 16 U/L
BILIRUB SERPL-MCNC: <0.1 MG/DL
BUN SERPL-MCNC: 23 MG/DL
CALCIUM SERPL-MCNC: 9.4 MG/DL
CHLORIDE SERPL-SCNC: 95 MMOL/L
CO2 SERPL-SCNC: 23 MMOL/L
CREAT SERPL-MCNC: 1.28 MG/DL
EGFR: 44 ML/MIN/1.73M2
GLUCOSE SERPL-MCNC: 433 MG/DL
POTASSIUM SERPL-SCNC: 5.4 MMOL/L
PROT SERPL-MCNC: 7.2 G/DL
SODIUM SERPL-SCNC: 133 MMOL/L

## 2022-05-04 NOTE — ASSESSMENT
[FreeTextEntry1] : IMP: 76 year old female present with symptomatic anemia- with hepatic flexure adenocarcinoma\par \par PLAN: \par CBC and CMP ordered \par Patient need medical and cardiology optimization- if medically not contraindicated pt will benefits to be off anticoagulation in the perioperative period- would defer that to cardiology\par Plan for robotic right colectomy \par \par I have discussed the risks, benefits, alternatives, complications including but not limited bleeding, infection, damage to adjacent structures, sepsis, need for further procedures, sepsis, anastomotic leak,tumor recurrence to the patient in detail. Patient expressed verbal understanding. Written informed consent to be obtained in the preoperative period \par \par I have discussed the diagnosis, therapeutic plan and options with the patient at length. Patient expressed verbal understanding to proceed with proposed plan. All questions answered. \par   \par

## 2022-05-04 NOTE — CONSULT LETTER
[Consult Letter:] : I had the pleasure of evaluating your patient, [unfilled]. [Please see my note below.] : Please see my note below. [Consult Closing:] : Thank you very much for allowing me to participate in the care of this patient.  If you have any questions, please do not hesitate to contact me. [Sincerely,] : Sincerely, [Dear  ___] : Dear  [unfilled], [( Thank you for referring [unfilled] for consultation for _____ )] : Thank you for referring [unfilled] for consultation for [unfilled] [FreeTextEntry3] : Catrachito Dee MD, FICS, FACS\par Director of Surgical Oncology- [facility]\par ,Department of Surgery\par [facility]\par [address]\[place], [place]- [address]\par \par [address]\[place]- [address]\par \par [address]\par [place], [place]- [address]\par \par (mob) [phone]\par (o) [phone]\par (f) [phone]\par

## 2022-05-04 NOTE — PHYSICAL EXAM
[Normal] : supple, no neck mass and thyroid not enlarged [Normal Neck Lymph Nodes] : normal neck lymph nodes  [Normal Supraclavicular Lymph Nodes] : normal supraclavicular lymph nodes [Normal Groin Lymph Nodes] : normal groin lymph nodes [Normal Axillary Lymph Nodes] : normal axillary lymph nodes [Normal] : oriented to person, place and time, with appropriate affect [FreeTextEntry1] : COVID-19 precautions as per [facility] policy was universally followed  [de-identified] : Abdomen soft, non-tender, non-distended, and no palpable masses.

## 2022-05-09 RX ORDER — NEOMYCIN SULFATE 500 MG/1
500 TABLET ORAL
Qty: 6 | Refills: 0 | Status: ACTIVE | COMMUNITY
Start: 2022-05-09 | End: 1900-01-01

## 2022-05-09 RX ORDER — POLYETHYLENE GLYCOL 3350, SODIUM SULFATE, SODIUM CHLORIDE, POTASSIUM CHLORIDE, SODIUM ASCORBATE, AND ASCORBIC ACID 7.5-2.691G
100 KIT ORAL
Qty: 1 | Refills: 0 | Status: ACTIVE | COMMUNITY
Start: 2022-05-09 | End: 1900-01-01

## 2022-05-09 RX ORDER — POTASSIUM CHLORIDE 1500 MG/1
20 TABLET, FILM COATED, EXTENDED RELEASE ORAL
Qty: 4 | Refills: 0 | Status: ACTIVE | COMMUNITY
Start: 2022-05-09 | End: 1900-01-01

## 2022-05-19 ENCOUNTER — OUTPATIENT (OUTPATIENT)
Dept: OUTPATIENT SERVICES | Facility: HOSPITAL | Age: 77
LOS: 1 days | End: 2022-05-19
Payer: MEDICARE

## 2022-05-19 VITALS
HEIGHT: 62 IN | WEIGHT: 149.91 LBS | RESPIRATION RATE: 17 BRPM | HEART RATE: 69 BPM | DIASTOLIC BLOOD PRESSURE: 72 MMHG | OXYGEN SATURATION: 99 % | SYSTOLIC BLOOD PRESSURE: 142 MMHG | TEMPERATURE: 98 F

## 2022-05-19 DIAGNOSIS — Z01.818 ENCOUNTER FOR OTHER PREPROCEDURAL EXAMINATION: ICD-10-CM

## 2022-05-19 DIAGNOSIS — I10 ESSENTIAL (PRIMARY) HYPERTENSION: ICD-10-CM

## 2022-05-19 DIAGNOSIS — E11.9 TYPE 2 DIABETES MELLITUS WITHOUT COMPLICATIONS: ICD-10-CM

## 2022-05-19 DIAGNOSIS — K63.89 OTHER SPECIFIED DISEASES OF INTESTINE: ICD-10-CM

## 2022-05-19 DIAGNOSIS — C18.9 MALIGNANT NEOPLASM OF COLON, UNSPECIFIED: ICD-10-CM

## 2022-05-19 DIAGNOSIS — Z91.89 OTHER SPECIFIED PERSONAL RISK FACTORS, NOT ELSEWHERE CLASSIFIED: ICD-10-CM

## 2022-05-19 DIAGNOSIS — E03.9 HYPOTHYROIDISM, UNSPECIFIED: ICD-10-CM

## 2022-05-19 DIAGNOSIS — Z95.0 PRESENCE OF CARDIAC PACEMAKER: Chronic | ICD-10-CM

## 2022-05-19 DIAGNOSIS — I48.91 UNSPECIFIED ATRIAL FIBRILLATION: ICD-10-CM

## 2022-05-19 DIAGNOSIS — Z90.12 ACQUIRED ABSENCE OF LEFT BREAST AND NIPPLE: Chronic | ICD-10-CM

## 2022-05-19 DIAGNOSIS — Z98.890 OTHER SPECIFIED POSTPROCEDURAL STATES: Chronic | ICD-10-CM

## 2022-05-19 DIAGNOSIS — I25.10 ATHEROSCLEROTIC HEART DISEASE OF NATIVE CORONARY ARTERY WITHOUT ANGINA PECTORIS: ICD-10-CM

## 2022-05-19 DIAGNOSIS — Z95.1 PRESENCE OF AORTOCORONARY BYPASS GRAFT: Chronic | ICD-10-CM

## 2022-05-19 LAB — BLD GP AB SCN SERPL QL: SIGNIFICANT CHANGE UP

## 2022-05-19 PROCEDURE — 86850 RBC ANTIBODY SCREEN: CPT

## 2022-05-19 PROCEDURE — 86901 BLOOD TYPING SEROLOGIC RH(D): CPT

## 2022-05-19 PROCEDURE — G0463: CPT

## 2022-05-19 PROCEDURE — 36415 COLL VENOUS BLD VENIPUNCTURE: CPT

## 2022-05-19 PROCEDURE — 86900 BLOOD TYPING SEROLOGIC ABO: CPT

## 2022-05-19 RX ORDER — PANTOPRAZOLE SODIUM 20 MG/1
1 TABLET, DELAYED RELEASE ORAL
Qty: 0 | Refills: 0 | DISCHARGE

## 2022-05-19 RX ORDER — INSULIN DETEMIR 100/ML (3)
0 INSULIN PEN (ML) SUBCUTANEOUS
Qty: 0 | Refills: 0 | DISCHARGE

## 2022-05-19 RX ORDER — FENOFIBRATE,MICRONIZED 130 MG
0 CAPSULE ORAL
Qty: 0 | Refills: 0 | DISCHARGE

## 2022-05-19 NOTE — H&P PST ADULT - PROBLEM SELECTOR PLAN 3
Current treatment regimen - Xarelto 25mg daily. Advised to continue with current regimen.  Advised patient to HOLD Xarelto 3days before surgery as recommended by cardiologist Dr. Trinh Coe in cardiac preoperative optimization. Optimized to proceed Low risk for cardiac events - see paper report  Follow up with Cardiologist postoperatively

## 2022-05-19 NOTE — H&P PST ADULT - NSICDXPASTMEDICALHX_GEN_ALL_CORE_FT
PAST MEDICAL HISTORY:  Atrial fibrillation and flutter     CAD (coronary artery disease)     DM (diabetes mellitus)     Hypertension     Hypothyroidism      PAST MEDICAL HISTORY:  Anemia     Atrial fibrillation and flutter     Breast cancer left breast    CAD (coronary artery disease)     Cataract     DM (diabetes mellitus)     Hypertension     Hypothyroidism     Mixed hyperlipidemia     Osteopenia     Osteoporosis

## 2022-05-19 NOTE — H&P PST ADULT - PROBLEM SELECTOR PLAN 4
Current treatment regimen - Aspirin 81mg daily.  Advised to continue with current regimen.  Advised patient to continue with Aspirin 81mg daily indefinitely as per cardiologist recommendation - see paper chart.   Patient, daughter and HHA instructed to also take with a sip of water the day of surgery  Follow up with Cardiologist postoperatively

## 2022-05-19 NOTE — H&P PST ADULT - PROBLEM SELECTOR PLAN 6
Current treatment regimen for diabetes - Metformin 1000mg bid, Januvia 100mg daily, Levemir 35units daily. Advised to continue with regimen   Patient, daughter and HHA instructed with understanding verbalized to HOLD Metformin, Januvia the day of surgery, but patient is to take Levemir (75%) 26units of regular dose.  Last Hgb A1C done on 4/29/2022 - 8.4  Fingerstick STAT AM day of surgery ordered  Follow up with PCP postoperatively. Current treatment regimen for diabetes - Metformin 1000mg bid, Januvia 100mg daily, Levemir 35units daily. Advised to continue with regimen   Patient, daughter and HHA instructed with understanding verbalized to HOLD Metformin, Januvia the day of surgery, but patient is to take Levemir 26units (75%) of regular dose  Last Hgb A1C done on 4/29/2022 - 8.4  Fingerstick STAT AM day of surgery ordered  Follow up with PCP postoperatively.

## 2022-05-19 NOTE — H&P PST ADULT - MS GEN HX ROS MEA POS PC
arthritis/joint swelling/joint pain/muscle cramps/muscle weakness/stiffness/back pain/leg pain L/leg pain R

## 2022-05-19 NOTE — H&P PST ADULT - HISTORY OF PRESENT ILLNESS
76year old female with pmhx of hypertension 76year old  female accompanied by daughter and HHA with pmhx of Afib on Xarelto with micra transcatheter pacemaker implanted 7/17/2017, hypertension (hydralazine, losartan, furosemide), Mixed hyperlipidemia (fenofibrate, rosuvastatin), osteoporosis, T2DM, CAD- s/p CABG in 2017, OA, left breast malignant neoplasm - s/p left breast total mastectomy, bilateral eye cataract, hypothyroidism, Alzheimer, anemia and seasonal allergies presents with newly diagnosed colon cancer. Patient is here today for presurgical testing for scheduled robotic assisted laparoscopic partial colon resection on 5/26/2022

## 2022-05-19 NOTE — H&P PST ADULT - PROBLEM SELECTOR PLAN 1
Patient is scheduled for robotic assisted laparoscopic partial colon resection on 5/26/2022  Written and oral preoperative instructions given to patient with understanding verbalized.   Instructions given to include using 4% chlorhexidine wash as directed starting 3days before day of surgery (inclusive of day of surgery)  Maintaining NPO status post midnight day before surgery  Stopping aspirin, NSAIDs, herbs, vitamins 7days before surgery   Patient is to expect a phone call day before surgery between the hours of 430- 630pm giving arrival time for surgery day.    Type/Screen drawn today, results pending   Medical preoperative optimization done by PCP. Paper report in chart.  PCP recommends that patient be admitted day before surgery for electrolyte correction, blood transfusion and better glucose control. PCP discussed recommendation with Dr. Dee (surgeon)as per note  Discussed case with Dr. Savage - Anesthesiologist Patient is scheduled for robotic assisted laparoscopic partial colon resection on 5/26/2022  Written and oral preoperative instructions given to patient with understanding verbalized.   Instructions given to include using 4% chlorhexidine wash as directed starting 3days before day of surgery (inclusive of day of surgery)  Maintaining NPO status post midnight day before surgery  Stopping aspirin, NSAIDs, herbs, vitamins 7days before surgery   Patient is to expect a phone call day before surgery between the hours of 430- 630pm giving arrival time for surgery day.    Type/Screen drawn today, results pending   Advised to follow bowel prep instructions given by surgeon   Medical preoperative optimization done by PCP. Paper report in chart.  PCP recommends that patient be admitted day before surgery for electrolyte correction, blood transfusion and better glucose control. PCP discussed recommendation with Dr. Dee (surgeon)as per note  Discussed case with Dr. Savage - Anesthesiologist

## 2022-05-19 NOTE — H&P PST ADULT - NSICDXPASTSURGICALHX_GEN_ALL_CORE_FT
PAST SURGICAL HISTORY:  Cardiac pacemaker     S/P CABG x 1     S/P cardiac catheterization 5/10/2022     PAST SURGICAL HISTORY:  Cardiac pacemaker 7/17/2017    History of left mastectomy     S/P CABG x 1     S/P cardiac catheterization 5/10/2022

## 2022-05-19 NOTE — H&P PST ADULT - PROBLEM SELECTOR PLAN 5
Patient today with STOP bang score of 4, Intermediate risk for IVONNE   Patient denies current hx/dx of IVONNE/Sleep Study Test   Recommend maintaining perioperative IVONNE risk precautions.

## 2022-05-19 NOTE — H&P PST ADULT - PROBLEM SELECTOR PLAN 7
Current treatment regimen - Levothyroxine 150mcg daily. Advised to continue with current regimen.  Patient instructed with understanding verbalized to take Levothyroxine with a sip of water the day of surgery   Follow up with PCP/Cardiologist postoperatively Current treatment regimen - Levothyroxine 75mcg daily. Advised to continue with current regimen.  Patient instructed with understanding verbalized to take Levothyroxine with a sip of water the day of surgery   Follow up with PCP/Cardiologist postoperatively

## 2022-05-19 NOTE — H&P PST ADULT - PROBLEM SELECTOR PLAN 2
Current treatment regimen - hydralazine 25mg, Toprol 100mg, Losartan 50mg, Furosemide 20mg.   Advised to continue with current regimen.  Patient, daughter and HHA instructed with understanding verbalized to take hydralazine, losartan, Toprol with a sip of water the day of surgery   Follow up with PCP/Cardiologist postoperatively

## 2022-05-19 NOTE — H&P PST ADULT - NEUROLOGICAL COMMENTS
HX Diabetic neuropathy, sciatica, Alzheimer on donepezil Hx Diabetic neuropathy, sciatica, Alzheimer on donepezil

## 2022-05-19 NOTE — H&P PST ADULT - NSANTHOSAYNRD_GEN_A_CORE
No. IVONNE screening performed.  STOP BANG Legend: 0-2 = LOW Risk; 3-4 = INTERMEDIATE Risk; 5-8 = HIGH Risk

## 2022-05-19 NOTE — H&P PST ADULT - NEGATIVE ENMT SYMPTOMS
no hearing difficulty/no tinnitus/no nasal discharge/no gum bleeding/no dry mouth/no throat pain/no dysphagia

## 2022-05-19 NOTE — H&P PST ADULT - LAST CARDIAC ANGIOGRAM/IMAGING
s/p cardiac catheterization 5/10/22 s/p cardiac catheterization 5/10/22- showed patent grafts as per cardiology preoperative optimization report

## 2022-05-24 LAB — SARS-COV-2 N GENE NPH QL NAA+PROBE: NOT DETECTED

## 2022-05-25 ENCOUNTER — INPATIENT (INPATIENT)
Facility: HOSPITAL | Age: 77
LOS: 8 days | Discharge: HOME | DRG: 330 | End: 2022-06-03
Attending: SURGERY | Admitting: SURGERY
Payer: MEDICARE

## 2022-05-25 VITALS
OXYGEN SATURATION: 99 % | HEIGHT: 62 IN | HEART RATE: 70 BPM | RESPIRATION RATE: 16 BRPM | WEIGHT: 154.1 LBS | TEMPERATURE: 98 F | SYSTOLIC BLOOD PRESSURE: 137 MMHG | DIASTOLIC BLOOD PRESSURE: 62 MMHG

## 2022-05-25 DIAGNOSIS — K63.89 OTHER SPECIFIED DISEASES OF INTESTINE: ICD-10-CM

## 2022-05-25 DIAGNOSIS — Z98.890 OTHER SPECIFIED POSTPROCEDURAL STATES: Chronic | ICD-10-CM

## 2022-05-25 DIAGNOSIS — Z95.1 PRESENCE OF AORTOCORONARY BYPASS GRAFT: Chronic | ICD-10-CM

## 2022-05-25 DIAGNOSIS — Z90.12 ACQUIRED ABSENCE OF LEFT BREAST AND NIPPLE: Chronic | ICD-10-CM

## 2022-05-25 DIAGNOSIS — Z95.0 PRESENCE OF CARDIAC PACEMAKER: Chronic | ICD-10-CM

## 2022-05-25 LAB
ALBUMIN SERPL ELPH-MCNC: 4 G/DL — SIGNIFICANT CHANGE UP (ref 3.5–5)
ALP SERPL-CCNC: 29 U/L — LOW (ref 40–120)
ALT FLD-CCNC: 26 U/L DA — SIGNIFICANT CHANGE UP (ref 10–60)
ANION GAP SERPL CALC-SCNC: 8 MMOL/L — SIGNIFICANT CHANGE UP (ref 5–17)
APTT BLD: 28.1 SEC — SIGNIFICANT CHANGE UP (ref 27.5–35.5)
AST SERPL-CCNC: 27 U/L — SIGNIFICANT CHANGE UP (ref 10–40)
BASOPHILS # BLD AUTO: 0.03 K/UL — SIGNIFICANT CHANGE UP (ref 0–0.2)
BASOPHILS NFR BLD AUTO: 0.5 % — SIGNIFICANT CHANGE UP (ref 0–2)
BILIRUB SERPL-MCNC: 0.1 MG/DL — LOW (ref 0.2–1.2)
BUN SERPL-MCNC: 37 MG/DL — HIGH (ref 7–18)
CALCIUM SERPL-MCNC: 9 MG/DL — SIGNIFICANT CHANGE UP (ref 8.4–10.5)
CHLORIDE SERPL-SCNC: 99 MMOL/L — SIGNIFICANT CHANGE UP (ref 96–108)
CO2 SERPL-SCNC: 26 MMOL/L — SIGNIFICANT CHANGE UP (ref 22–31)
CREAT SERPL-MCNC: 1.79 MG/DL — HIGH (ref 0.5–1.3)
EGFR: 29 ML/MIN/1.73M2 — LOW
EOSINOPHIL # BLD AUTO: 0.1 K/UL — SIGNIFICANT CHANGE UP (ref 0–0.5)
EOSINOPHIL NFR BLD AUTO: 1.8 % — SIGNIFICANT CHANGE UP (ref 0–6)
GLUCOSE SERPL-MCNC: 167 MG/DL — HIGH (ref 70–99)
HCT VFR BLD CALC: 26.3 % — LOW (ref 34.5–45)
HGB BLD-MCNC: 8.3 G/DL — LOW (ref 11.5–15.5)
IMM GRANULOCYTES NFR BLD AUTO: 1.3 % — SIGNIFICANT CHANGE UP (ref 0–1.5)
INR BLD: 1.27 RATIO — HIGH (ref 0.88–1.16)
LYMPHOCYTES # BLD AUTO: 1.85 K/UL — SIGNIFICANT CHANGE UP (ref 1–3.3)
LYMPHOCYTES # BLD AUTO: 33.3 % — SIGNIFICANT CHANGE UP (ref 13–44)
MCHC RBC-ENTMCNC: 30 PG — SIGNIFICANT CHANGE UP (ref 27–34)
MCHC RBC-ENTMCNC: 31.6 GM/DL — LOW (ref 32–36)
MCV RBC AUTO: 94.9 FL — SIGNIFICANT CHANGE UP (ref 80–100)
MONOCYTES # BLD AUTO: 0.49 K/UL — SIGNIFICANT CHANGE UP (ref 0–0.9)
MONOCYTES NFR BLD AUTO: 8.8 % — SIGNIFICANT CHANGE UP (ref 2–14)
NEUTROPHILS # BLD AUTO: 3.02 K/UL — SIGNIFICANT CHANGE UP (ref 1.8–7.4)
NEUTROPHILS NFR BLD AUTO: 54.3 % — SIGNIFICANT CHANGE UP (ref 43–77)
NRBC # BLD: 0 /100 WBCS — SIGNIFICANT CHANGE UP (ref 0–0)
PLATELET # BLD AUTO: 221 K/UL — SIGNIFICANT CHANGE UP (ref 150–400)
POTASSIUM SERPL-MCNC: 5.1 MMOL/L — SIGNIFICANT CHANGE UP (ref 3.5–5.3)
POTASSIUM SERPL-SCNC: 5.1 MMOL/L — SIGNIFICANT CHANGE UP (ref 3.5–5.3)
PROT SERPL-MCNC: 7.7 G/DL — SIGNIFICANT CHANGE UP (ref 6–8.3)
PROTHROM AB SERPL-ACNC: 15.1 SEC — HIGH (ref 10.5–13.4)
RBC # BLD: 2.77 M/UL — LOW (ref 3.8–5.2)
RBC # FLD: 21.4 % — HIGH (ref 10.3–14.5)
SARS-COV-2 RNA SPEC QL NAA+PROBE: SIGNIFICANT CHANGE UP
SODIUM SERPL-SCNC: 133 MMOL/L — LOW (ref 135–145)
WBC # BLD: 5.56 K/UL — SIGNIFICANT CHANGE UP (ref 3.8–10.5)
WBC # FLD AUTO: 5.56 K/UL — SIGNIFICANT CHANGE UP (ref 3.8–10.5)

## 2022-05-25 PROCEDURE — 99285 EMERGENCY DEPT VISIT HI MDM: CPT

## 2022-05-25 RX ORDER — NEOMYCIN SULFATE 500 MG/1
1000 TABLET ORAL ONCE
Refills: 0 | Status: COMPLETED | OUTPATIENT
Start: 2022-05-25 | End: 2022-05-25

## 2022-05-25 RX ORDER — LEVOTHYROXINE SODIUM 125 MCG
75 TABLET ORAL DAILY
Refills: 0 | Status: DISCONTINUED | OUTPATIENT
Start: 2022-05-25 | End: 2022-05-27

## 2022-05-25 RX ORDER — SODIUM CHLORIDE 9 MG/ML
1000 INJECTION INTRAMUSCULAR; INTRAVENOUS; SUBCUTANEOUS ONCE
Refills: 0 | Status: COMPLETED | OUTPATIENT
Start: 2022-05-25 | End: 2022-05-25

## 2022-05-25 RX ORDER — SODIUM CHLORIDE 9 MG/ML
1000 INJECTION INTRAMUSCULAR; INTRAVENOUS; SUBCUTANEOUS
Refills: 0 | Status: DISCONTINUED | OUTPATIENT
Start: 2022-05-25 | End: 2022-05-25

## 2022-05-25 RX ORDER — HEPARIN SODIUM 5000 [USP'U]/ML
5000 INJECTION INTRAVENOUS; SUBCUTANEOUS EVERY 8 HOURS
Refills: 0 | Status: DISCONTINUED | OUTPATIENT
Start: 2022-05-25 | End: 2022-05-27

## 2022-05-25 RX ORDER — SODIUM CHLORIDE 9 MG/ML
1000 INJECTION, SOLUTION INTRAVENOUS
Refills: 0 | Status: DISCONTINUED | OUTPATIENT
Start: 2022-05-25 | End: 2022-05-27

## 2022-05-25 RX ORDER — ERYTHROMYCIN ETHYLSUCCINATE 400 MG
1000 TABLET ORAL ONCE
Refills: 0 | Status: COMPLETED | OUTPATIENT
Start: 2022-05-26 | End: 2022-05-26

## 2022-05-25 RX ORDER — HYDRALAZINE HCL 50 MG
25 TABLET ORAL DAILY
Refills: 0 | Status: DISCONTINUED | OUTPATIENT
Start: 2022-05-25 | End: 2022-05-27

## 2022-05-25 RX ORDER — ERYTHROMYCIN ETHYLSUCCINATE 400 MG
1000 TABLET ORAL ONCE
Refills: 0 | Status: COMPLETED | OUTPATIENT
Start: 2022-05-25 | End: 2022-05-25

## 2022-05-25 RX ORDER — SOD SULF/SODIUM/NAHCO3/KCL/PEG
4000 SOLUTION, RECONSTITUTED, ORAL ORAL ONCE
Refills: 0 | Status: COMPLETED | OUTPATIENT
Start: 2022-05-25 | End: 2022-05-25

## 2022-05-25 RX ORDER — NEOMYCIN SULFATE 500 MG/1
1000 TABLET ORAL ONCE
Refills: 0 | Status: COMPLETED | OUTPATIENT
Start: 2022-05-26 | End: 2022-05-26

## 2022-05-25 RX ORDER — METOPROLOL TARTRATE 50 MG
100 TABLET ORAL DAILY
Refills: 0 | Status: DISCONTINUED | OUTPATIENT
Start: 2022-05-25 | End: 2022-05-27

## 2022-05-25 RX ADMIN — SODIUM CHLORIDE 75 MILLILITER(S): 9 INJECTION, SOLUTION INTRAVENOUS at 22:57

## 2022-05-25 RX ADMIN — NEOMYCIN SULFATE 1000 MILLIGRAM(S): 500 TABLET ORAL at 19:04

## 2022-05-25 RX ADMIN — Medication 750 MILLIGRAM(S): at 19:03

## 2022-05-25 RX ADMIN — Medication 4000 MILLILITER(S): at 19:05

## 2022-05-25 RX ADMIN — SODIUM CHLORIDE 1000 MILLILITER(S): 9 INJECTION INTRAMUSCULAR; INTRAVENOUS; SUBCUTANEOUS at 20:38

## 2022-05-25 RX ADMIN — HEPARIN SODIUM 5000 UNIT(S): 5000 INJECTION INTRAVENOUS; SUBCUTANEOUS at 22:56

## 2022-05-25 NOTE — ED PROVIDER NOTE - NSICDXPASTSURGICALHX_GEN_ALL_CORE_FT
PAST SURGICAL HISTORY:  Cardiac pacemaker 7/17/2017    History of left mastectomy     S/P CABG x 1     S/P cardiac catheterization 5/10/2022

## 2022-05-25 NOTE — ED PROVIDER NOTE - OBJECTIVE STATEMENT
Patient reports she came in for surgery tomorrow for a  mass in her colon. Denies fever, cp, sob, ap, n/v/d.

## 2022-05-25 NOTE — H&P ADULT - ASSESSMENT
76F with hepatic flexure mass, pre-admit for Robotic assisted colon resection tomorrow.  -Admit to [facility] under Dr. Dee  -Mechanical and Antibiotic bowel prep STAT.  GoLytely, Erythromycin, Neomycin to begin ASAP  -Clears until midnight, then NPO  -Heparin  -Home meds  -OR tomorrow for Robotic assisted colon resection

## 2022-05-25 NOTE — ED ADULT NURSE NOTE - ED STAT RN HANDOFF DETAILS
pt is awaiting for transport /report given to rn /silverio/ safety maintained/pt refused  iv and blood work/dr marx and geneva marie made aware /medicated as ordered

## 2022-05-25 NOTE — ED ADULT NURSE NOTE - NSIMPLEMENTINTERV_GEN_ALL_ED
Patient recently seen Dr Gillis on 8/20/19.    Patient is requesting a referral to [facility] to see Dr Erik Vázquez, Orthopedic Surgeon for a 2nd option.    Ok to complete referral?     Implemented All Fall Risk Interventions:  [place] to call system. Call bell, personal items and telephone within reach. Instruct patient to call for assistance. Room bathroom lighting operational. Non-slip footwear when patient is off stretcher. Physically safe environment: no spills, clutter or unnecessary equipment. Stretcher in lowest position, wheels locked, appropriate side rails in place. Provide visual cue, wrist band, yellow gown, etc. Monitor gait and stability. Monitor for mental status changes and reorient to person, place, and time. Review medications for side effects contributing to fall risk. Reinforce activity limits and safety measures with patient and family.

## 2022-05-25 NOTE — H&P ADULT - NSHPPHYSICALEXAM_GEN_ALL_CORE
GA: AAOx3, no acute distress  CVS:  no murmurs, rubs or gallops  Pulm: bilateral breath sounds present.   Abd: soft, nontender, nondistended  : no elaine  MSKL FROM in bilateral upper and lower extremities

## 2022-05-25 NOTE — ED ADULT NURSE NOTE - NS ED NURSE RECORD ANOTHER HT AND WT
Caller: Lizbeth Johns    Relationship to patient: Self    Best call back number: [phone]    PATIENT RETURNED CALL. HUB GAVE PT NEW APPT DATE ON 1-. PT CONFIRMED APPT DATE AND TIME.  
Pt reporting that she accidentally missed her appointment yesterday and would like to know the results of her recent CT scan. Advised pt, Jaimie can follow up on this.  
Yes

## 2022-05-25 NOTE — PATIENT PROFILE ADULT - FALL HARM RISK - HARM RISK INTERVENTIONS
Assistance with ambulation/Assistance OOB with selected safe patient handling equipment/Communicate Risk of Fall with Harm to all staff/Discuss with provider need for PT consult/Monitor gait and stability/Provide patient with walking aids - walker, cane, crutches/Reinforce activity limits and safety measures with patient and family/Tailored Fall Risk Interventions/Use of alarms - bed, chair and/or voice tab/Visual Cue: Yellow wristband and red socks/Bed in lowest position, wheels locked, appropriate side rails in place/Call bell, personal items and telephone in reach/Instruct patient to call for assistance before getting out of bed or chair/Non-slip footwear when patient is out of bed/[place] to call system/Physically safe environment - no spills, clutter or unnecessary equipment/Purposeful Proactive Rounding/Room/bathroom lighting operational, light cord in reach

## 2022-05-25 NOTE — ED PROVIDER NOTE - NSICDXPASTMEDICALHX_GEN_ALL_CORE_FT
PAST MEDICAL HISTORY:  Anemia     Atrial fibrillation and flutter     Breast cancer left breast    CAD (coronary artery disease)     Cataract     DM (diabetes mellitus)     Hypertension     Hypothyroidism     Mixed hyperlipidemia     Osteopenia     Osteoporosis

## 2022-05-25 NOTE — ED PROVIDER NOTE - PROGRESS NOTE DETAILS
Called by Dr. Hudson who requested admission to Dr. Dee for colon resection for hepatic flexure mass. Requested to start bowel prep ASAP.

## 2022-05-25 NOTE — H&P ADULT - HISTORY OF PRESENT ILLNESS
76year old  female accompanied by daughter and HHA with pmhx of Afib on Xarelto with micra transcatheter pacemaker implanted 7/17/2017, hypertension (hydralazine, losartan, furosemide), Mixed hyperlipidemia (fenofibrate, rosuvastatin), osteoporosis, T2DM, CAD- s/p CABG in 2017, OA, left breast malignant neoplasm - s/p left breast total mastectomy, bilateral eye cataract, hypothyroidism, Alzheimer, anemia and seasonal allergies presents with newly diagnosed colon cancer. found to have hepatic flexure mass, pre-admit for robotic assisted colon resection tomorrow 5/26. [facility].

## 2022-05-25 NOTE — ED ADULT NURSE NOTE - CHIEF COMPLAINT QUOTE
"I am scheduled for colon surgery tomorrow here with Dr Dee I got discharged from [facility] today after 2 day hospitalization for hyperglycemia" per pt

## 2022-05-25 NOTE — ED ADULT TRIAGE NOTE - CHIEF COMPLAINT QUOTE
"I am scheduled for colon surgery tomorrow here with Dr Dee I got discharged from [facility] today after 2 day hospitalization for hyperglycemia" per pt

## 2022-05-26 ENCOUNTER — TRANSCRIPTION ENCOUNTER (OUTPATIENT)
Age: 77
End: 2022-05-26

## 2022-05-26 PROBLEM — E78.2 MIXED HYPERLIPIDEMIA: Chronic | Status: ACTIVE | Noted: 2022-05-19

## 2022-05-26 PROBLEM — D64.9 ANEMIA, UNSPECIFIED: Chronic | Status: ACTIVE | Noted: 2022-05-19

## 2022-05-26 PROBLEM — M85.80 OTHER SPECIFIED DISORDERS OF BONE DENSITY AND STRUCTURE, UNSPECIFIED SITE: Chronic | Status: ACTIVE | Noted: 2022-05-19

## 2022-05-26 PROBLEM — H26.9 UNSPECIFIED CATARACT: Chronic | Status: ACTIVE | Noted: 2022-05-19

## 2022-05-26 PROBLEM — M81.0 AGE-RELATED OSTEOPOROSIS WITHOUT CURRENT PATHOLOGICAL FRACTURE: Chronic | Status: ACTIVE | Noted: 2022-05-19

## 2022-05-26 PROBLEM — C50.919 MALIGNANT NEOPLASM OF UNSPECIFIED SITE OF UNSPECIFIED FEMALE BREAST: Chronic | Status: ACTIVE | Noted: 2022-05-19

## 2022-05-26 RX ORDER — METOPROLOL TARTRATE 50 MG
100 TABLET ORAL ONCE
Refills: 0 | Status: COMPLETED | OUTPATIENT
Start: 2022-05-26 | End: 2022-05-26

## 2022-05-26 RX ORDER — DEXTROSE 50 % IN WATER 50 %
25 SYRINGE (ML) INTRAVENOUS ONCE
Refills: 0 | Status: DISCONTINUED | OUTPATIENT
Start: 2022-05-26 | End: 2022-05-27

## 2022-05-26 RX ORDER — INSULIN LISPRO 100/ML
VIAL (ML) SUBCUTANEOUS EVERY 6 HOURS
Refills: 0 | Status: DISCONTINUED | OUTPATIENT
Start: 2022-05-26 | End: 2022-05-27

## 2022-05-26 RX ORDER — GLUCAGON INJECTION, SOLUTION 0.5 MG/.1ML
1 INJECTION, SOLUTION SUBCUTANEOUS ONCE
Refills: 0 | Status: DISCONTINUED | OUTPATIENT
Start: 2022-05-26 | End: 2022-05-27

## 2022-05-26 RX ORDER — CHLORHEXIDINE GLUCONATE 213 G/1000ML
1 SOLUTION TOPICAL DAILY
Refills: 0 | Status: DISCONTINUED | OUTPATIENT
Start: 2022-05-26 | End: 2022-05-27

## 2022-05-26 RX ORDER — DEXTROSE 50 % IN WATER 50 %
12.5 SYRINGE (ML) INTRAVENOUS ONCE
Refills: 0 | Status: DISCONTINUED | OUTPATIENT
Start: 2022-05-26 | End: 2022-05-27

## 2022-05-26 RX ORDER — ACETAMINOPHEN 500 MG
650 TABLET ORAL ONCE
Refills: 0 | Status: COMPLETED | OUTPATIENT
Start: 2022-05-26 | End: 2022-05-26

## 2022-05-26 RX ORDER — SODIUM CHLORIDE 9 MG/ML
1000 INJECTION, SOLUTION INTRAVENOUS
Refills: 0 | Status: DISCONTINUED | OUTPATIENT
Start: 2022-05-26 | End: 2022-05-27

## 2022-05-26 RX ORDER — DEXTROSE 50 % IN WATER 50 %
15 SYRINGE (ML) INTRAVENOUS ONCE
Refills: 0 | Status: DISCONTINUED | OUTPATIENT
Start: 2022-05-26 | End: 2022-05-27

## 2022-05-26 RX ADMIN — Medication 2: at 23:58

## 2022-05-26 RX ADMIN — Medication 650 MILLIGRAM(S): at 23:54

## 2022-05-26 RX ADMIN — Medication 650 MILLIGRAM(S): at 22:59

## 2022-05-26 RX ADMIN — Medication 100 MILLIGRAM(S): at 22:16

## 2022-05-26 RX ADMIN — Medication 25 MILLIGRAM(S): at 05:24

## 2022-05-26 RX ADMIN — Medication 1000 MILLIGRAM(S): at 03:58

## 2022-05-26 RX ADMIN — NEOMYCIN SULFATE 1000 MILLIGRAM(S): 500 TABLET ORAL at 03:58

## 2022-05-26 RX ADMIN — HEPARIN SODIUM 5000 UNIT(S): 5000 INJECTION INTRAVENOUS; SUBCUTANEOUS at 14:48

## 2022-05-26 RX ADMIN — Medication 2: at 12:53

## 2022-05-26 RX ADMIN — HEPARIN SODIUM 5000 UNIT(S): 5000 INJECTION INTRAVENOUS; SUBCUTANEOUS at 05:24

## 2022-05-26 RX ADMIN — HEPARIN SODIUM 5000 UNIT(S): 5000 INJECTION INTRAVENOUS; SUBCUTANEOUS at 22:16

## 2022-05-26 NOTE — PROGRESS NOTE ADULT - SUBJECTIVE AND OBJECTIVE BOX
Patient seen and examined at bedside. No acute issues overnight. Pain controlled, underwent bowel prep and received perioperative antibiotics. Pending OR today for robotic assisted right hemicolectomy.    Vital Signs Last 24 Hrs  T(C): 36.8 (26 May 2022 05:18), Max: 37.2 (25 May 2022 21:56)  T(F): 98.2 (26 May 2022 05:18), Max: 98.9 (25 May 2022 21:56)  HR: 77 (26 May 2022 05:18) (65 - 77)  BP: 156/48 (26 May 2022 05:18) (137/62 - 161/64)  BP(mean): 79 (25 May 2022 21:56) (79 - 79)  RR: 17 (26 May 2022 05:18) (16 - 18)  SpO2: 100% (26 May 2022 05:18) (98% - 100%)    General: NAD  Cardio: RRR  Resp: normal resp effort  Abd: soft, NT, ND                          8.3    5.56  )-----------( 221      ( 25 May 2022 19:39 )             26.3   05-25    133<L>  |  99  |  37<H>  ----------------------------<  167<H>  5.1   |  26  |  1.79<H>    Ca    9.0      25 May 2022 19:39    TPro  7.7  /  Alb  4.0  /  TBili  0.1<L>  /  DBili  x   /  AST  27  /  ALT  26  /  AlkPhos  29<L>  05-25

## 2022-05-27 ENCOUNTER — TRANSCRIPTION ENCOUNTER (OUTPATIENT)
Age: 77
End: 2022-05-27

## 2022-05-27 ENCOUNTER — APPOINTMENT (OUTPATIENT)
Dept: SURGICAL ONCOLOGY | Facility: HOSPITAL | Age: 77
End: 2022-05-27

## 2022-05-27 ENCOUNTER — RESULT REVIEW (OUTPATIENT)
Age: 77
End: 2022-05-27

## 2022-05-27 LAB
ANION GAP SERPL CALC-SCNC: 10 MMOL/L — SIGNIFICANT CHANGE UP (ref 5–17)
ANION GAP SERPL CALC-SCNC: 5 MMOL/L — SIGNIFICANT CHANGE UP (ref 5–17)
BUN SERPL-MCNC: 13 MG/DL — SIGNIFICANT CHANGE UP (ref 7–18)
BUN SERPL-MCNC: 9 MG/DL — SIGNIFICANT CHANGE UP (ref 7–18)
CALCIUM SERPL-MCNC: 8.3 MG/DL — LOW (ref 8.4–10.5)
CALCIUM SERPL-MCNC: 8.4 MG/DL — SIGNIFICANT CHANGE UP (ref 8.4–10.5)
CHLORIDE SERPL-SCNC: 110 MMOL/L — HIGH (ref 96–108)
CHLORIDE SERPL-SCNC: 110 MMOL/L — HIGH (ref 96–108)
CO2 SERPL-SCNC: 22 MMOL/L — SIGNIFICANT CHANGE UP (ref 22–31)
CO2 SERPL-SCNC: 28 MMOL/L — SIGNIFICANT CHANGE UP (ref 22–31)
CREAT SERPL-MCNC: 1.1 MG/DL — SIGNIFICANT CHANGE UP (ref 0.5–1.3)
CREAT SERPL-MCNC: 1.16 MG/DL — SIGNIFICANT CHANGE UP (ref 0.5–1.3)
EGFR: 49 ML/MIN/1.73M2 — LOW
EGFR: 52 ML/MIN/1.73M2 — LOW
GLUCOSE SERPL-MCNC: 141 MG/DL — HIGH (ref 70–99)
GLUCOSE SERPL-MCNC: 160 MG/DL — HIGH (ref 70–99)
HCT VFR BLD CALC: 24 % — LOW (ref 34.5–45)
HCT VFR BLD CALC: 29.8 % — LOW (ref 34.5–45)
HGB BLD-MCNC: 10 G/DL — LOW (ref 11.5–15.5)
HGB BLD-MCNC: 7.4 G/DL — LOW (ref 11.5–15.5)
MCHC RBC-ENTMCNC: 30.1 PG — SIGNIFICANT CHANGE UP (ref 27–34)
MCHC RBC-ENTMCNC: 30.1 PG — SIGNIFICANT CHANGE UP (ref 27–34)
MCHC RBC-ENTMCNC: 30.8 GM/DL — LOW (ref 32–36)
MCHC RBC-ENTMCNC: 33.6 GM/DL — SIGNIFICANT CHANGE UP (ref 32–36)
MCV RBC AUTO: 89.8 FL — SIGNIFICANT CHANGE UP (ref 80–100)
MCV RBC AUTO: 97.6 FL — SIGNIFICANT CHANGE UP (ref 80–100)
NRBC # BLD: 0 /100 WBCS — SIGNIFICANT CHANGE UP (ref 0–0)
NRBC # BLD: 0 /100 WBCS — SIGNIFICANT CHANGE UP (ref 0–0)
PLATELET # BLD AUTO: 236 K/UL — SIGNIFICANT CHANGE UP (ref 150–400)
PLATELET # BLD AUTO: 260 K/UL — SIGNIFICANT CHANGE UP (ref 150–400)
POTASSIUM SERPL-MCNC: 3.6 MMOL/L — SIGNIFICANT CHANGE UP (ref 3.5–5.3)
POTASSIUM SERPL-MCNC: 3.8 MMOL/L — SIGNIFICANT CHANGE UP (ref 3.5–5.3)
POTASSIUM SERPL-SCNC: 3.6 MMOL/L — SIGNIFICANT CHANGE UP (ref 3.5–5.3)
POTASSIUM SERPL-SCNC: 3.8 MMOL/L — SIGNIFICANT CHANGE UP (ref 3.5–5.3)
RBC # BLD: 2.46 M/UL — LOW (ref 3.8–5.2)
RBC # BLD: 3.32 M/UL — LOW (ref 3.8–5.2)
RBC # FLD: 21.2 % — HIGH (ref 10.3–14.5)
RBC # FLD: 22.2 % — HIGH (ref 10.3–14.5)
SODIUM SERPL-SCNC: 142 MMOL/L — SIGNIFICANT CHANGE UP (ref 135–145)
SODIUM SERPL-SCNC: 143 MMOL/L — SIGNIFICANT CHANGE UP (ref 135–145)
WBC # BLD: 3.55 K/UL — LOW (ref 3.8–10.5)
WBC # BLD: 8.52 K/UL — SIGNIFICANT CHANGE UP (ref 3.8–10.5)
WBC # FLD AUTO: 3.55 K/UL — LOW (ref 3.8–10.5)
WBC # FLD AUTO: 8.52 K/UL — SIGNIFICANT CHANGE UP (ref 3.8–10.5)

## 2022-05-27 PROCEDURE — 13101 CMPLX RPR TRUNK 2.6-7.5 CM: CPT | Mod: 59

## 2022-05-27 PROCEDURE — 88309 TISSUE EXAM BY PATHOLOGIST: CPT | Mod: 26

## 2022-05-27 PROCEDURE — 49905 OMENTAL FLAP INTRA-ABDOM: CPT

## 2022-05-27 PROCEDURE — 38747 REMOVE ABDOMINAL LYMPH NODES: CPT

## 2022-05-27 PROCEDURE — 44205 LAP COLECTOMY PART W/ILEUM: CPT | Mod: 22

## 2022-05-27 DEVICE — STAPLER COVIDIEN TRI-STAPLE 45MM PURPLE RELOAD: Type: IMPLANTABLE DEVICE | Status: FUNCTIONAL

## 2022-05-27 DEVICE — SPONGE HSTAT SURGICEL 2X14": Type: IMPLANTABLE DEVICE | Status: FUNCTIONAL

## 2022-05-27 DEVICE — STAPLER COVIDIEN TA 90 BLUE: Type: IMPLANTABLE DEVICE | Status: FUNCTIONAL

## 2022-05-27 DEVICE — CLIP APPLIER COVIDIEN ENDOCLIP III 5MM: Type: IMPLANTABLE DEVICE | Status: FUNCTIONAL

## 2022-05-27 DEVICE — STAPLER COVIDIEN TRI-STAPLE CURVED 60MM PURPLE RELOAD: Type: IMPLANTABLE DEVICE | Status: FUNCTIONAL

## 2022-05-27 DEVICE — SEALANT VISTASEAL FIBRIN HUMAN 4ML: Type: IMPLANTABLE DEVICE | Status: FUNCTIONAL

## 2022-05-27 DEVICE — STAPLER COVIDIEN TRI-STAPLE 45MM TAN RELOAD: Type: IMPLANTABLE DEVICE | Status: FUNCTIONAL

## 2022-05-27 DEVICE — STAPLER COVIDIEN TRI-STAPLE 60MM PURPLE RELOAD: Type: IMPLANTABLE DEVICE | Status: FUNCTIONAL

## 2022-05-27 DEVICE — STAPLER COVIDIEN TRI-STAPLE CURVED 30MM GRAY RELOAD: Type: IMPLANTABLE DEVICE | Status: FUNCTIONAL

## 2022-05-27 DEVICE — SEALANT TISSEEL PRE FILLED FROZEN 4ML: Type: IMPLANTABLE DEVICE | Status: FUNCTIONAL

## 2022-05-27 DEVICE — SPONGE HSTAT SURGICEL FIBRILLAR 2X4": Type: IMPLANTABLE DEVICE | Status: FUNCTIONAL

## 2022-05-27 RX ORDER — ACETAMINOPHEN 500 MG
1000 TABLET ORAL ONCE
Refills: 0 | Status: COMPLETED | OUTPATIENT
Start: 2022-05-27 | End: 2022-05-27

## 2022-05-27 RX ORDER — INSULIN LISPRO 100/ML
VIAL (ML) SUBCUTANEOUS
Refills: 0 | Status: DISCONTINUED | OUTPATIENT
Start: 2022-05-27 | End: 2022-06-03

## 2022-05-27 RX ORDER — HEPARIN SODIUM 5000 [USP'U]/ML
5000 INJECTION INTRAVENOUS; SUBCUTANEOUS EVERY 8 HOURS
Refills: 0 | Status: COMPLETED | OUTPATIENT
Start: 2022-05-27 | End: 2022-06-02

## 2022-05-27 RX ORDER — SODIUM CHLORIDE 9 MG/ML
1000 INJECTION, SOLUTION INTRAVENOUS
Refills: 0 | Status: DISCONTINUED | OUTPATIENT
Start: 2022-05-27 | End: 2022-06-03

## 2022-05-27 RX ORDER — HYDRALAZINE HCL 50 MG
25 TABLET ORAL DAILY
Refills: 0 | Status: DISCONTINUED | OUTPATIENT
Start: 2022-05-27 | End: 2022-06-03

## 2022-05-27 RX ORDER — HYDROMORPHONE HYDROCHLORIDE 2 MG/ML
0.5 INJECTION INTRAMUSCULAR; INTRAVENOUS; SUBCUTANEOUS
Refills: 0 | Status: DISCONTINUED | OUTPATIENT
Start: 2022-05-27 | End: 2022-05-27

## 2022-05-27 RX ORDER — LOSARTAN POTASSIUM 100 MG/1
50 TABLET, FILM COATED ORAL DAILY
Refills: 0 | Status: DISCONTINUED | OUTPATIENT
Start: 2022-05-27 | End: 2022-05-27

## 2022-05-27 RX ORDER — GLUCAGON INJECTION, SOLUTION 0.5 MG/.1ML
1 INJECTION, SOLUTION SUBCUTANEOUS ONCE
Refills: 0 | Status: DISCONTINUED | OUTPATIENT
Start: 2022-05-27 | End: 2022-06-03

## 2022-05-27 RX ORDER — DEXTROSE 50 % IN WATER 50 %
15 SYRINGE (ML) INTRAVENOUS ONCE
Refills: 0 | Status: DISCONTINUED | OUTPATIENT
Start: 2022-05-27 | End: 2022-06-03

## 2022-05-27 RX ORDER — LEVOTHYROXINE SODIUM 125 MCG
75 TABLET ORAL DAILY
Refills: 0 | Status: DISCONTINUED | OUTPATIENT
Start: 2022-05-27 | End: 2022-06-03

## 2022-05-27 RX ORDER — ACETAMINOPHEN 500 MG
1000 TABLET ORAL ONCE
Refills: 0 | Status: COMPLETED | OUTPATIENT
Start: 2022-05-28 | End: 2022-05-28

## 2022-05-27 RX ORDER — ACETAMINOPHEN 500 MG
1000 TABLET ORAL ONCE
Refills: 0 | Status: DISCONTINUED | OUTPATIENT
Start: 2022-05-27 | End: 2022-06-03

## 2022-05-27 RX ORDER — DEXTROSE 50 % IN WATER 50 %
25 SYRINGE (ML) INTRAVENOUS ONCE
Refills: 0 | Status: DISCONTINUED | OUTPATIENT
Start: 2022-05-27 | End: 2022-06-03

## 2022-05-27 RX ORDER — CEFOTETAN DISODIUM 1 G
1 VIAL (EA) INJECTION EVERY 12 HOURS
Refills: 0 | Status: COMPLETED | OUTPATIENT
Start: 2022-05-27 | End: 2022-05-28

## 2022-05-27 RX ORDER — SODIUM CHLORIDE 9 MG/ML
1000 INJECTION, SOLUTION INTRAVENOUS
Refills: 0 | Status: DISCONTINUED | OUTPATIENT
Start: 2022-05-27 | End: 2022-05-27

## 2022-05-27 RX ORDER — DEXTROSE 50 % IN WATER 50 %
12.5 SYRINGE (ML) INTRAVENOUS ONCE
Refills: 0 | Status: DISCONTINUED | OUTPATIENT
Start: 2022-05-27 | End: 2022-06-03

## 2022-05-27 RX ORDER — LOSARTAN POTASSIUM 100 MG/1
50 TABLET, FILM COATED ORAL DAILY
Refills: 0 | Status: DISCONTINUED | OUTPATIENT
Start: 2022-05-27 | End: 2022-06-03

## 2022-05-27 RX ORDER — HYDROMORPHONE HYDROCHLORIDE 2 MG/ML
1 INJECTION INTRAMUSCULAR; INTRAVENOUS; SUBCUTANEOUS
Refills: 0 | Status: DISCONTINUED | OUTPATIENT
Start: 2022-05-27 | End: 2022-05-27

## 2022-05-27 RX ORDER — METOPROLOL TARTRATE 50 MG
100 TABLET ORAL DAILY
Refills: 0 | Status: DISCONTINUED | OUTPATIENT
Start: 2022-05-27 | End: 2022-06-03

## 2022-05-27 RX ADMIN — SODIUM CHLORIDE 75 MILLILITER(S): 9 INJECTION, SOLUTION INTRAVENOUS at 05:54

## 2022-05-27 RX ADMIN — HYDROMORPHONE HYDROCHLORIDE 1 MILLIGRAM(S): 2 INJECTION INTRAMUSCULAR; INTRAVENOUS; SUBCUTANEOUS at 22:15

## 2022-05-27 RX ADMIN — Medication 25 MILLIGRAM(S): at 05:53

## 2022-05-27 RX ADMIN — Medication 75 MICROGRAM(S): at 05:54

## 2022-05-27 RX ADMIN — HYDROMORPHONE HYDROCHLORIDE 1 MILLIGRAM(S): 2 INJECTION INTRAMUSCULAR; INTRAVENOUS; SUBCUTANEOUS at 21:45

## 2022-05-27 RX ADMIN — Medication 1: at 12:48

## 2022-05-27 RX ADMIN — CHLORHEXIDINE GLUCONATE 1 APPLICATION(S): 213 SOLUTION TOPICAL at 15:01

## 2022-05-27 RX ADMIN — Medication 400 MILLIGRAM(S): at 23:24

## 2022-05-27 RX ADMIN — Medication 100 MILLIGRAM(S): at 05:53

## 2022-05-27 NOTE — DIETITIAN INITIAL EVALUATION ADULT - OTHER INFO
Detail Level: Detailed Pt seen, asleep with blood transfusion. Seen by MORENITA Dumont, 4/14 @ 70 Kg (5'3). Unable to determine weight loss/ amount/time. Pt for colon resection today. Quality 110: Preventive Care And Screening: Influenza Immunization: Influenza Immunization Ordered or Recommended, but not Administered due to system reason Quality 431: Preventive Care And Screening: Unhealthy Alcohol Use - Screening: Patient screened for unhealthy alcohol use using a single question and scores less than 2 times per year

## 2022-05-27 NOTE — DIETITIAN INITIAL EVALUATION ADULT - NS FNS DIET ORDER
Diet, NPO after Midnight:      NPO Start Date: 26-May-2022,   NPO Start Time: 23:59 (05-26-22 @ 18:07)  Diet, Clear Liquid (05-26-22 @ 18:07)

## 2022-05-27 NOTE — DIETITIAN INITIAL EVALUATION ADULT - PERTINENT MEDS FT
MEDICATIONS  (STANDING):  chlorhexidine 2% Cloths 1 Application(s) Topical daily  dextrose 5% + sodium chloride 0.9%. 1000 milliLiter(s) (75 mL/Hr) IV Continuous <Continuous>  dextrose 5%. 1000 milliLiter(s) (100 mL/Hr) IV Continuous <Continuous>  dextrose 5%. 1000 milliLiter(s) (50 mL/Hr) IV Continuous <Continuous>  dextrose 50% Injectable 25 Gram(s) IV Push once  dextrose 50% Injectable 12.5 Gram(s) IV Push once  dextrose 50% Injectable 25 Gram(s) IV Push once  glucagon  Injectable 1 milliGRAM(s) IntraMuscular once  heparin   Injectable 5000 Unit(s) SubCutaneous every 8 hours  hydrALAZINE 25 milliGRAM(s) Oral daily  insulin lispro (ADMELOG) corrective regimen sliding scale   SubCutaneous every 6 hours  levothyroxine 75 MICROGram(s) Oral daily  losartan 50 milliGRAM(s) Oral daily  metoprolol succinate  milliGRAM(s) Oral daily    MEDICATIONS  (PRN):  dextrose Oral Gel 15 Gram(s) Oral once PRN Blood Glucose LESS THAN 70 milliGRAM(s)/deciliter

## 2022-05-27 NOTE — DIETITIAN INITIAL EVALUATION ADULT - PERTINENT LABORATORY DATA
05-27    143  |  110<H>  |  13  ----------------------------<  141<H>  3.8   |  28  |  1.10    Ca    8.3<L>      27 May 2022 07:16    TPro  7.7  /  Alb  4.0  /  TBili  0.1<L>  /  DBili  x   /  AST  27  /  ALT  26  /  AlkPhos  29<L>  05-25  POCT Blood Glucose.: 186 mg/dL (05-27-22 @ 11:48)  A1C with Estimated Average Glucose Result: 7.5 % (04-09-22 @ 09:45)

## 2022-05-27 NOTE — PROGRESS NOTE ADULT - SUBJECTIVE AND OBJECTIVE BOX
Patient seen and examined at bedside. Re-scheduled for lap right colon resection today. Pain controlled, no nausea/vomiting, tolerated clears yesterday, currently NPO for OR. Hypertensive overnight, started on losartan.    Vital Signs Last 24 Hrs  T(C): 37.1 (27 May 2022 05:15), Max: 37.1 (27 May 2022 05:15)  T(F): 98.7 (27 May 2022 05:15), Max: 98.7 (27 May 2022 05:15)  HR: 63 (27 May 2022 05:15) (63 - 79)  BP: 172/57 (27 May 2022 05:15) (170/74 - 173/60)  BP(mean): --  RR: 18 (27 May 2022 05:15) (18 - 18)  SpO2: 99% (27 May 2022 05:15) (94% - 99%)    General: NAD  Cardio: RRR  Resp: normal resp effort  Abd: soft, nontender, nondistended                          7.4    3.55  )-----------( 260      ( 27 May 2022 07:16 )             24.0   05-27    143  |  110<H>  |  13  ----------------------------<  141<H>  3.8   |  28  |  1.10    Ca    8.3<L>      27 May 2022 07:16    TPro  7.7  /  Alb  4.0  /  TBili  0.1<L>  /  DBili  x   /  AST  27  /  ALT  26  /  AlkPhos  29<L>  05-25

## 2022-05-28 LAB
ANION GAP SERPL CALC-SCNC: 10 MMOL/L — SIGNIFICANT CHANGE UP (ref 5–17)
BUN SERPL-MCNC: 8 MG/DL — SIGNIFICANT CHANGE UP (ref 7–18)
CALCIUM SERPL-MCNC: 8.2 MG/DL — LOW (ref 8.4–10.5)
CHLORIDE SERPL-SCNC: 107 MMOL/L — SIGNIFICANT CHANGE UP (ref 96–108)
CO2 SERPL-SCNC: 24 MMOL/L — SIGNIFICANT CHANGE UP (ref 22–31)
CREAT SERPL-MCNC: 1.11 MG/DL — SIGNIFICANT CHANGE UP (ref 0.5–1.3)
EGFR: 52 ML/MIN/1.73M2 — LOW
GLUCOSE SERPL-MCNC: 214 MG/DL — HIGH (ref 70–99)
HCT VFR BLD CALC: 29.3 % — LOW (ref 34.5–45)
HGB BLD-MCNC: 9.7 G/DL — LOW (ref 11.5–15.5)
MAGNESIUM SERPL-MCNC: 1.5 MG/DL — LOW (ref 1.6–2.6)
MCHC RBC-ENTMCNC: 29.5 PG — SIGNIFICANT CHANGE UP (ref 27–34)
MCHC RBC-ENTMCNC: 33.1 GM/DL — SIGNIFICANT CHANGE UP (ref 32–36)
MCV RBC AUTO: 89.1 FL — SIGNIFICANT CHANGE UP (ref 80–100)
NRBC # BLD: 0 /100 WBCS — SIGNIFICANT CHANGE UP (ref 0–0)
PHOSPHATE SERPL-MCNC: 3.4 MG/DL — SIGNIFICANT CHANGE UP (ref 2.5–4.5)
PLATELET # BLD AUTO: 228 K/UL — SIGNIFICANT CHANGE UP (ref 150–400)
POTASSIUM SERPL-MCNC: 3.8 MMOL/L — SIGNIFICANT CHANGE UP (ref 3.5–5.3)
POTASSIUM SERPL-SCNC: 3.8 MMOL/L — SIGNIFICANT CHANGE UP (ref 3.5–5.3)
RBC # BLD: 3.29 M/UL — LOW (ref 3.8–5.2)
RBC # FLD: 21.7 % — HIGH (ref 10.3–14.5)
SODIUM SERPL-SCNC: 141 MMOL/L — SIGNIFICANT CHANGE UP (ref 135–145)
WBC # BLD: 8.59 K/UL — SIGNIFICANT CHANGE UP (ref 3.8–10.5)
WBC # FLD AUTO: 8.59 K/UL — SIGNIFICANT CHANGE UP (ref 3.8–10.5)

## 2022-05-28 RX ORDER — DEXTROSE MONOHYDRATE, SODIUM CHLORIDE, AND POTASSIUM CHLORIDE 50; .745; 4.5 G/1000ML; G/1000ML; G/1000ML
1000 INJECTION, SOLUTION INTRAVENOUS
Refills: 0 | Status: DISCONTINUED | OUTPATIENT
Start: 2022-05-28 | End: 2022-05-30

## 2022-05-28 RX ORDER — MAGNESIUM OXIDE 400 MG ORAL TABLET 241.3 MG
400 TABLET ORAL ONCE
Refills: 0 | Status: COMPLETED | OUTPATIENT
Start: 2022-05-28 | End: 2022-05-28

## 2022-05-28 RX ORDER — POTASSIUM CHLORIDE 20 MEQ
20 PACKET (EA) ORAL ONCE
Refills: 0 | Status: COMPLETED | OUTPATIENT
Start: 2022-05-28 | End: 2022-05-28

## 2022-05-28 RX ORDER — HYDROMORPHONE HYDROCHLORIDE 2 MG/ML
0.5 INJECTION INTRAMUSCULAR; INTRAVENOUS; SUBCUTANEOUS EVERY 4 HOURS
Refills: 0 | Status: DISCONTINUED | OUTPATIENT
Start: 2022-05-28 | End: 2022-06-03

## 2022-05-28 RX ORDER — HYDROMORPHONE HYDROCHLORIDE 2 MG/ML
1 INJECTION INTRAMUSCULAR; INTRAVENOUS; SUBCUTANEOUS ONCE
Refills: 0 | Status: DISCONTINUED | OUTPATIENT
Start: 2022-05-28 | End: 2022-05-28

## 2022-05-28 RX ORDER — HYDROMORPHONE HYDROCHLORIDE 2 MG/ML
0.5 INJECTION INTRAMUSCULAR; INTRAVENOUS; SUBCUTANEOUS ONCE
Refills: 0 | Status: DISCONTINUED | OUTPATIENT
Start: 2022-05-28 | End: 2022-05-28

## 2022-05-28 RX ADMIN — Medication 1000 MILLIGRAM(S): at 21:55

## 2022-05-28 RX ADMIN — HYDROMORPHONE HYDROCHLORIDE 0.5 MILLIGRAM(S): 2 INJECTION INTRAMUSCULAR; INTRAVENOUS; SUBCUTANEOUS at 01:22

## 2022-05-28 RX ADMIN — Medication 400 MILLIGRAM(S): at 21:25

## 2022-05-28 RX ADMIN — HYDROMORPHONE HYDROCHLORIDE 0.5 MILLIGRAM(S): 2 INJECTION INTRAMUSCULAR; INTRAVENOUS; SUBCUTANEOUS at 12:36

## 2022-05-28 RX ADMIN — HYDROMORPHONE HYDROCHLORIDE 1 MILLIGRAM(S): 2 INJECTION INTRAMUSCULAR; INTRAVENOUS; SUBCUTANEOUS at 07:13

## 2022-05-28 RX ADMIN — HEPARIN SODIUM 5000 UNIT(S): 5000 INJECTION INTRAVENOUS; SUBCUTANEOUS at 06:05

## 2022-05-28 RX ADMIN — Medication 100 GRAM(S): at 19:05

## 2022-05-28 RX ADMIN — Medication 2: at 19:02

## 2022-05-28 RX ADMIN — MAGNESIUM OXIDE 400 MG ORAL TABLET 400 MILLIGRAM(S): 241.3 TABLET ORAL at 15:03

## 2022-05-28 RX ADMIN — HYDROMORPHONE HYDROCHLORIDE 0.5 MILLIGRAM(S): 2 INJECTION INTRAMUSCULAR; INTRAVENOUS; SUBCUTANEOUS at 02:00

## 2022-05-28 RX ADMIN — HYDROMORPHONE HYDROCHLORIDE 0.5 MILLIGRAM(S): 2 INJECTION INTRAMUSCULAR; INTRAVENOUS; SUBCUTANEOUS at 19:13

## 2022-05-28 RX ADMIN — Medication 75 MICROGRAM(S): at 06:04

## 2022-05-28 RX ADMIN — HYDROMORPHONE HYDROCHLORIDE 1 MILLIGRAM(S): 2 INJECTION INTRAMUSCULAR; INTRAVENOUS; SUBCUTANEOUS at 06:49

## 2022-05-28 RX ADMIN — LOSARTAN POTASSIUM 50 MILLIGRAM(S): 100 TABLET, FILM COATED ORAL at 06:05

## 2022-05-28 RX ADMIN — DEXTROSE MONOHYDRATE, SODIUM CHLORIDE, AND POTASSIUM CHLORIDE 50 MILLILITER(S): 50; .745; 4.5 INJECTION, SOLUTION INTRAVENOUS at 12:40

## 2022-05-28 RX ADMIN — HYDROMORPHONE HYDROCHLORIDE 0.5 MILLIGRAM(S): 2 INJECTION INTRAMUSCULAR; INTRAVENOUS; SUBCUTANEOUS at 13:00

## 2022-05-28 RX ADMIN — Medication 100 GRAM(S): at 06:23

## 2022-05-28 RX ADMIN — Medication 100 MILLIGRAM(S): at 06:04

## 2022-05-28 RX ADMIN — Medication 25 MILLIGRAM(S): at 06:04

## 2022-05-28 RX ADMIN — Medication 1000 MILLIGRAM(S): at 06:43

## 2022-05-28 RX ADMIN — HEPARIN SODIUM 5000 UNIT(S): 5000 INJECTION INTRAVENOUS; SUBCUTANEOUS at 15:06

## 2022-05-28 RX ADMIN — Medication 20 MILLIEQUIVALENT(S): at 15:02

## 2022-05-28 RX ADMIN — Medication 1000 MILLIGRAM(S): at 00:15

## 2022-05-28 RX ADMIN — Medication 400 MILLIGRAM(S): at 06:04

## 2022-05-28 RX ADMIN — HEPARIN SODIUM 5000 UNIT(S): 5000 INJECTION INTRAVENOUS; SUBCUTANEOUS at 21:26

## 2022-05-28 RX ADMIN — Medication 400 MILLIGRAM(S): at 15:05

## 2022-05-28 RX ADMIN — Medication 1000 MILLIGRAM(S): at 19:01

## 2022-05-28 NOTE — PHYSICAL THERAPY INITIAL EVALUATION ADULT - ADDITIONAL COMMENTS
Pt lives in an apartment with grandson with no steps to enter.  Pt states she has a HHA for 8 hours/day.  She owns a rollator walker and a rolling walker at home.

## 2022-05-28 NOTE — PROGRESS NOTE ADULT - SUBJECTIVE AND OBJECTIVE BOX
Vital Signs Last 24 Hrs  T(C): 37.2 (28 May 2022 04:50), Max: 37.2 (28 May 2022 04:50)  T(F): 99 (28 May 2022 04:50), Max: 99 (28 May 2022 04:50)  HR: 76 (28 May 2022 04:50) (64 - 80)  BP: 160/52 (28 May 2022 04:50) (116/56 - 188/69)  BP(mean): 67 (27 May 2022 22:40) (64 - 94)  RR: 17 (28 May 2022 04:50) (13 - 22)  SpO2: 97% (28 May 2022 04:50) (96% - 100%)    I&O's Detail    27 May 2022 07:01  -  28 May 2022 07:00  --------------------------------------------------------  IN:    Lactated Ringers Bolus: 1500 mL    PRBCs (Packed Red Blood Cells): 350 mL    Sodium Chloride 0.9% Bolus: 1000 mL  Total IN: 2850 mL    OUT:    Blood Loss (mL): 100 mL    Indwelling Catheter - Urethral (mL): 600 mL  Total OUT: 700 mL    Total NET: 2150 mL                                9.7    8.59  )-----------( 228      ( 28 May 2022 07:07 )             29.3       05-28    141  |  107  |  8   ----------------------------<  214<H>  3.8   |  24  |  1.11    Ca    8.2<L>      28 May 2022 07:07  Phos  3.4     05-28  Mg     1.5     05-28    dressings in place  no nuasea            PLAN:  D/C elaine  Ambulate  clear liquid diet

## 2022-05-28 NOTE — PHYSICAL THERAPY INITIAL EVALUATION ADULT - DIAGNOSIS, PT EVAL
Pt with impairment in pain, muscle strength and endurance affecting her ability to perform functional mobility.

## 2022-05-28 NOTE — PROGRESS NOTE ADULT - SUBJECTIVE AND OBJECTIVE BOX
POST-OPERATIVE NOTE    Subjective:   76y Female s/p lap extended rt hemicolectomy POD #0 . Seen and examined at bed side . Incisional pain Denies nausea, vomiting, chest pain, sob, fevers chills.     Vital Signs Last 24 Hrs  T(C): 37.2 (28 May 2022 04:50), Max: 37.2 (28 May 2022 04:50)  T(F): 99 (28 May 2022 04:50), Max: 99 (28 May 2022 04:50)  HR: 76 (28 May 2022 04:50) (64 - 80)  BP: 160/52 (28 May 2022 04:50) (116/56 - 188/69)  BP(mean): 67 (27 May 2022 22:40) (64 - 94)  RR: 17 (28 May 2022 04:50) (13 - 22)  SpO2: 97% (28 May 2022 04:50) (96% - 100%)  I&O's Detail    27 May 2022 07:01  -  28 May 2022 06:34  --------------------------------------------------------  IN:    Lactated Ringers Bolus: 1500 mL    PRBCs (Packed Red Blood Cells): 350 mL    Sodium Chloride 0.9% Bolus: 1000 mL  Total IN: 2850 mL    OUT:    Blood Loss (mL): 100 mL    Indwelling Catheter - Urethral (mL): 600 mL  Total OUT: 700 mL    Total NET: 2150 mL          Physical Exam:  General: NAD, resting comfortably in bed  Pulmonary: Nonlabored breathing, no respiratory distress  Cardiovascular: NSR, S1, S2  Abdominal: soft, NT/ND, dressing c/d/i  Extremities: no edema, no calf tenderness, distal pulses are palpable     LABS:                        10.0   8.52  )-----------( 236      ( 27 May 2022 21:37 )             29.8     05-27    142  |  110<H>  |  9   ----------------------------<  160<H>  3.6   |  22  |  1.16    Ca    8.4      27 May 2022 21:37          MEDICATIONS  (STANDING):  acetaminophen   IVPB .. 1000 milliGRAM(s) IV Intermittent once  acetaminophen   IVPB .. 1000 milliGRAM(s) IV Intermittent once  acetaminophen   IVPB .. 1000 milliGRAM(s) IV Intermittent once  cefoTEtan  IVPB 1 Gram(s) IV Intermittent every 12 hours  dextrose 5%. 1000 milliLiter(s) (50 mL/Hr) IV Continuous <Continuous>  dextrose 5%. 1000 milliLiter(s) (100 mL/Hr) IV Continuous <Continuous>  dextrose 50% Injectable 25 Gram(s) IV Push once  dextrose 50% Injectable 12.5 Gram(s) IV Push once  dextrose 50% Injectable 25 Gram(s) IV Push once  glucagon  Injectable 1 milliGRAM(s) IntraMuscular once  heparin   Injectable 5000 Unit(s) SubCutaneous every 8 hours  hydrALAZINE 25 milliGRAM(s) Oral daily  HYDROmorphone  Injectable 1 milliGRAM(s) IV Push once  insulin lispro (ADMELOG) corrective regimen sliding scale   SubCutaneous Before meals and at bedtime  levothyroxine 75 MICROGram(s) Oral daily  losartan 50 milliGRAM(s) Oral daily  metoprolol succinate  milliGRAM(s) Oral daily    MEDICATIONS  (PRN):  dextrose Oral Gel 15 Gram(s) Oral once PRN Blood Glucose LESS THAN 70 milliGRAM(s)/deciliter      Assessment:   76y Female who is s/p.s/p lap extended rt hemicolectomy POD #0 . Stable    Plan:  - Pain control prn  -Dressing change prn   -Awaits Bowel fxn  - Incentive Spirometry  - OOB and ambulating as tolerated  - F/u AM labs  - DVT ppx

## 2022-05-29 LAB
ANION GAP SERPL CALC-SCNC: 7 MMOL/L — SIGNIFICANT CHANGE UP (ref 5–17)
BUN SERPL-MCNC: 8 MG/DL — SIGNIFICANT CHANGE UP (ref 7–18)
CALCIUM SERPL-MCNC: 8.1 MG/DL — LOW (ref 8.4–10.5)
CHLORIDE SERPL-SCNC: 106 MMOL/L — SIGNIFICANT CHANGE UP (ref 96–108)
CO2 SERPL-SCNC: 25 MMOL/L — SIGNIFICANT CHANGE UP (ref 22–31)
CREAT SERPL-MCNC: 1.13 MG/DL — SIGNIFICANT CHANGE UP (ref 0.5–1.3)
EGFR: 51 ML/MIN/1.73M2 — LOW
GLUCOSE SERPL-MCNC: 213 MG/DL — HIGH (ref 70–99)
HCT VFR BLD CALC: 29.9 % — LOW (ref 34.5–45)
HGB BLD-MCNC: 9.8 G/DL — LOW (ref 11.5–15.5)
MAGNESIUM SERPL-MCNC: 1.8 MG/DL — SIGNIFICANT CHANGE UP (ref 1.6–2.6)
MCHC RBC-ENTMCNC: 30.1 PG — SIGNIFICANT CHANGE UP (ref 27–34)
MCHC RBC-ENTMCNC: 32.8 GM/DL — SIGNIFICANT CHANGE UP (ref 32–36)
MCV RBC AUTO: 91.7 FL — SIGNIFICANT CHANGE UP (ref 80–100)
NRBC # BLD: 0 /100 WBCS — SIGNIFICANT CHANGE UP (ref 0–0)
PHOSPHATE SERPL-MCNC: 2 MG/DL — LOW (ref 2.5–4.5)
PLATELET # BLD AUTO: 220 K/UL — SIGNIFICANT CHANGE UP (ref 150–400)
POTASSIUM SERPL-MCNC: 4.2 MMOL/L — SIGNIFICANT CHANGE UP (ref 3.5–5.3)
POTASSIUM SERPL-SCNC: 4.2 MMOL/L — SIGNIFICANT CHANGE UP (ref 3.5–5.3)
RBC # BLD: 3.26 M/UL — LOW (ref 3.8–5.2)
RBC # FLD: 22.4 % — HIGH (ref 10.3–14.5)
SODIUM SERPL-SCNC: 138 MMOL/L — SIGNIFICANT CHANGE UP (ref 135–145)
WBC # BLD: 8.78 K/UL — SIGNIFICANT CHANGE UP (ref 3.8–10.5)
WBC # FLD AUTO: 8.78 K/UL — SIGNIFICANT CHANGE UP (ref 3.8–10.5)

## 2022-05-29 RX ORDER — ACETAMINOPHEN 500 MG
1000 TABLET ORAL ONCE
Refills: 0 | Status: COMPLETED | OUTPATIENT
Start: 2022-05-29 | End: 2022-05-29

## 2022-05-29 RX ORDER — MAGNESIUM SULFATE 500 MG/ML
1 VIAL (ML) INJECTION ONCE
Refills: 0 | Status: COMPLETED | OUTPATIENT
Start: 2022-05-29 | End: 2022-05-29

## 2022-05-29 RX ORDER — ACETAMINOPHEN 500 MG
1000 TABLET ORAL ONCE
Refills: 0 | Status: COMPLETED | OUTPATIENT
Start: 2022-05-29 | End: 2022-05-30

## 2022-05-29 RX ORDER — ACETAMINOPHEN 500 MG
1000 TABLET ORAL ONCE
Refills: 0 | Status: COMPLETED | OUTPATIENT
Start: 2022-05-30 | End: 2022-05-30

## 2022-05-29 RX ADMIN — Medication 100 MILLIGRAM(S): at 05:28

## 2022-05-29 RX ADMIN — Medication 2: at 11:37

## 2022-05-29 RX ADMIN — HYDROMORPHONE HYDROCHLORIDE 0.5 MILLIGRAM(S): 2 INJECTION INTRAMUSCULAR; INTRAVENOUS; SUBCUTANEOUS at 09:40

## 2022-05-29 RX ADMIN — Medication 1000 MILLIGRAM(S): at 11:13

## 2022-05-29 RX ADMIN — HYDROMORPHONE HYDROCHLORIDE 0.5 MILLIGRAM(S): 2 INJECTION INTRAMUSCULAR; INTRAVENOUS; SUBCUTANEOUS at 00:15

## 2022-05-29 RX ADMIN — HYDROMORPHONE HYDROCHLORIDE 0.5 MILLIGRAM(S): 2 INJECTION INTRAMUSCULAR; INTRAVENOUS; SUBCUTANEOUS at 05:59

## 2022-05-29 RX ADMIN — Medication 400 MILLIGRAM(S): at 17:00

## 2022-05-29 RX ADMIN — HEPARIN SODIUM 5000 UNIT(S): 5000 INJECTION INTRAVENOUS; SUBCUTANEOUS at 21:39

## 2022-05-29 RX ADMIN — Medication 400 MILLIGRAM(S): at 10:31

## 2022-05-29 RX ADMIN — HYDROMORPHONE HYDROCHLORIDE 0.5 MILLIGRAM(S): 2 INJECTION INTRAMUSCULAR; INTRAVENOUS; SUBCUTANEOUS at 00:45

## 2022-05-29 RX ADMIN — Medication 1000 MILLIGRAM(S): at 15:45

## 2022-05-29 RX ADMIN — HYDROMORPHONE HYDROCHLORIDE 0.5 MILLIGRAM(S): 2 INJECTION INTRAMUSCULAR; INTRAVENOUS; SUBCUTANEOUS at 05:29

## 2022-05-29 RX ADMIN — LOSARTAN POTASSIUM 50 MILLIGRAM(S): 100 TABLET, FILM COATED ORAL at 05:29

## 2022-05-29 RX ADMIN — Medication 2: at 22:35

## 2022-05-29 RX ADMIN — HYDROMORPHONE HYDROCHLORIDE 0.5 MILLIGRAM(S): 2 INJECTION INTRAMUSCULAR; INTRAVENOUS; SUBCUTANEOUS at 22:23

## 2022-05-29 RX ADMIN — HYDROMORPHONE HYDROCHLORIDE 0.5 MILLIGRAM(S): 2 INJECTION INTRAMUSCULAR; INTRAVENOUS; SUBCUTANEOUS at 21:47

## 2022-05-29 RX ADMIN — Medication 100 GRAM(S): at 10:31

## 2022-05-29 RX ADMIN — Medication 1000 MILLIGRAM(S): at 15:30

## 2022-05-29 RX ADMIN — HEPARIN SODIUM 5000 UNIT(S): 5000 INJECTION INTRAVENOUS; SUBCUTANEOUS at 15:07

## 2022-05-29 RX ADMIN — HYDROMORPHONE HYDROCHLORIDE 0.5 MILLIGRAM(S): 2 INJECTION INTRAMUSCULAR; INTRAVENOUS; SUBCUTANEOUS at 09:20

## 2022-05-29 RX ADMIN — Medication 85 MILLIMOLE(S): at 10:31

## 2022-05-29 RX ADMIN — Medication 75 MICROGRAM(S): at 05:29

## 2022-05-29 RX ADMIN — HEPARIN SODIUM 5000 UNIT(S): 5000 INJECTION INTRAVENOUS; SUBCUTANEOUS at 05:30

## 2022-05-29 RX ADMIN — Medication 25 MILLIGRAM(S): at 05:28

## 2022-05-29 NOTE — PROGRESS NOTE ADULT - SUBJECTIVE AND OBJECTIVE BOX
Vital Signs Last 24 Hrs  T(C): 37.1 (29 May 2022 05:05), Max: 37.1 (29 May 2022 05:05)  T(F): 98.8 (29 May 2022 05:05), Max: 98.8 (29 May 2022 05:05)  HR: 69 (29 May 2022 05:05) (68 - 74)  BP: 170/55 (29 May 2022 05:05) (150/52 - 184/69)  BP(mean): --  RR: 18 (29 May 2022 05:05) (17 - 18)  SpO2: 97% (29 May 2022 05:05) (95% - 97%)    I&O's Detail                            9.8    8.78  )-----------( 220      ( 29 May 2022 06:36 )             29.9       05-29    138  |  106  |  8   ----------------------------<  213<H>  4.2   |  25  |  1.13    Ca    8.1<L>      29 May 2022 06:36  Phos  2.0     05-29  Mg     1.8     05-29    incisions clear  minimal PO intake  No flatus yet          PLAN:  clear liquids only  Ambulate

## 2022-05-30 LAB
ALBUMIN SERPL ELPH-MCNC: 2.6 G/DL — LOW (ref 3.5–5)
ALP SERPL-CCNC: 38 U/L — LOW (ref 40–120)
ALT FLD-CCNC: 19 U/L DA — SIGNIFICANT CHANGE UP (ref 10–60)
ANION GAP SERPL CALC-SCNC: 6 MMOL/L — SIGNIFICANT CHANGE UP (ref 5–17)
AST SERPL-CCNC: 12 U/L — SIGNIFICANT CHANGE UP (ref 10–40)
BILIRUB SERPL-MCNC: 0.2 MG/DL — SIGNIFICANT CHANGE UP (ref 0.2–1.2)
BUN SERPL-MCNC: 7 MG/DL — SIGNIFICANT CHANGE UP (ref 7–18)
CALCIUM SERPL-MCNC: 8.6 MG/DL — SIGNIFICANT CHANGE UP (ref 8.4–10.5)
CHLORIDE SERPL-SCNC: 107 MMOL/L — SIGNIFICANT CHANGE UP (ref 96–108)
CO2 SERPL-SCNC: 26 MMOL/L — SIGNIFICANT CHANGE UP (ref 22–31)
CREAT SERPL-MCNC: 0.99 MG/DL — SIGNIFICANT CHANGE UP (ref 0.5–1.3)
EGFR: 59 ML/MIN/1.73M2 — LOW
GLUCOSE SERPL-MCNC: 167 MG/DL — HIGH (ref 70–99)
HCT VFR BLD CALC: 31.2 % — LOW (ref 34.5–45)
HGB BLD-MCNC: 9.9 G/DL — LOW (ref 11.5–15.5)
MAGNESIUM SERPL-MCNC: 2.1 MG/DL — SIGNIFICANT CHANGE UP (ref 1.6–2.6)
MCHC RBC-ENTMCNC: 29.8 PG — SIGNIFICANT CHANGE UP (ref 27–34)
MCHC RBC-ENTMCNC: 31.7 GM/DL — LOW (ref 32–36)
MCV RBC AUTO: 94 FL — SIGNIFICANT CHANGE UP (ref 80–100)
NRBC # BLD: 0 /100 WBCS — SIGNIFICANT CHANGE UP (ref 0–0)
PHOSPHATE SERPL-MCNC: 2.8 MG/DL — SIGNIFICANT CHANGE UP (ref 2.5–4.5)
PLATELET # BLD AUTO: 204 K/UL — SIGNIFICANT CHANGE UP (ref 150–400)
POTASSIUM SERPL-MCNC: 4 MMOL/L — SIGNIFICANT CHANGE UP (ref 3.5–5.3)
POTASSIUM SERPL-SCNC: 4 MMOL/L — SIGNIFICANT CHANGE UP (ref 3.5–5.3)
PROT SERPL-MCNC: 6.6 G/DL — SIGNIFICANT CHANGE UP (ref 6–8.3)
RBC # BLD: 3.32 M/UL — LOW (ref 3.8–5.2)
RBC # FLD: 21.9 % — HIGH (ref 10.3–14.5)
SODIUM SERPL-SCNC: 139 MMOL/L — SIGNIFICANT CHANGE UP (ref 135–145)
WBC # BLD: 7.4 K/UL — SIGNIFICANT CHANGE UP (ref 3.8–10.5)
WBC # FLD AUTO: 7.4 K/UL — SIGNIFICANT CHANGE UP (ref 3.8–10.5)

## 2022-05-30 RX ORDER — DEXTROSE MONOHYDRATE, SODIUM CHLORIDE, AND POTASSIUM CHLORIDE 50; .745; 4.5 G/1000ML; G/1000ML; G/1000ML
1000 INJECTION, SOLUTION INTRAVENOUS
Refills: 0 | Status: DISCONTINUED | OUTPATIENT
Start: 2022-05-30 | End: 2022-05-31

## 2022-05-30 RX ADMIN — Medication 2: at 08:38

## 2022-05-30 RX ADMIN — HYDROMORPHONE HYDROCHLORIDE 0.5 MILLIGRAM(S): 2 INJECTION INTRAMUSCULAR; INTRAVENOUS; SUBCUTANEOUS at 22:31

## 2022-05-30 RX ADMIN — HYDROMORPHONE HYDROCHLORIDE 0.5 MILLIGRAM(S): 2 INJECTION INTRAMUSCULAR; INTRAVENOUS; SUBCUTANEOUS at 05:49

## 2022-05-30 RX ADMIN — HYDROMORPHONE HYDROCHLORIDE 0.5 MILLIGRAM(S): 2 INJECTION INTRAMUSCULAR; INTRAVENOUS; SUBCUTANEOUS at 06:24

## 2022-05-30 RX ADMIN — Medication 25 MILLIGRAM(S): at 05:23

## 2022-05-30 RX ADMIN — HEPARIN SODIUM 5000 UNIT(S): 5000 INJECTION INTRAVENOUS; SUBCUTANEOUS at 05:22

## 2022-05-30 RX ADMIN — DEXTROSE MONOHYDRATE, SODIUM CHLORIDE, AND POTASSIUM CHLORIDE 50 MILLILITER(S): 50; .745; 4.5 INJECTION, SOLUTION INTRAVENOUS at 10:51

## 2022-05-30 RX ADMIN — HYDROMORPHONE HYDROCHLORIDE 0.5 MILLIGRAM(S): 2 INJECTION INTRAMUSCULAR; INTRAVENOUS; SUBCUTANEOUS at 21:55

## 2022-05-30 RX ADMIN — LOSARTAN POTASSIUM 50 MILLIGRAM(S): 100 TABLET, FILM COATED ORAL at 05:23

## 2022-05-30 RX ADMIN — Medication 100 MILLIGRAM(S): at 05:23

## 2022-05-30 RX ADMIN — Medication 400 MILLIGRAM(S): at 12:19

## 2022-05-30 RX ADMIN — Medication 75 MICROGRAM(S): at 05:23

## 2022-05-30 RX ADMIN — Medication 1000 MILLIGRAM(S): at 12:49

## 2022-05-30 RX ADMIN — Medication 2: at 17:32

## 2022-05-30 RX ADMIN — HEPARIN SODIUM 5000 UNIT(S): 5000 INJECTION INTRAVENOUS; SUBCUTANEOUS at 21:30

## 2022-05-30 RX ADMIN — Medication 400 MILLIGRAM(S): at 01:02

## 2022-05-30 RX ADMIN — HEPARIN SODIUM 5000 UNIT(S): 5000 INJECTION INTRAVENOUS; SUBCUTANEOUS at 16:03

## 2022-05-30 RX ADMIN — Medication 1000 MILLIGRAM(S): at 01:38

## 2022-05-30 NOTE — PROGRESS NOTE ADULT - SUBJECTIVE AND OBJECTIVE BOX
Vital Signs Last 24 Hrs  T(C): 36.6 (30 May 2022 05:30), Max: 37.4 (29 May 2022 12:46)  T(F): 97.9 (30 May 2022 05:30), Max: 99.3 (29 May 2022 12:46)  HR: 67 (30 May 2022 05:30) (62 - 67)  BP: 159/52 (30 May 2022 05:30) (143/60 - 159/52)  BP(mean): --  RR: 18 (30 May 2022 05:30) (17 - 18)  SpO2: 97% (30 May 2022 05:30) (96% - 97%)    I&O's Detail                            9.9    7.40  )-----------( 204      ( 30 May 2022 06:04 )             31.2       05-30    139  |  107  |  7   ----------------------------<  167<H>  4.0   |  26  |  0.99    Ca    8.6      30 May 2022 06:04  Phos  2.8     05-30  Mg     2.1     05-30    TPro  6.6  /  Alb  2.6<L>  /  TBili  0.2  /  DBili  x   /  AST  12  /  ALT  19  /  AlkPhos  38<L>  05-30    pain improving  No GI function yet          PLAN:  Will stay with clear liquid diet until pt. passes flatus         Subjective: Patient reports improvement of the abdominal pain with medication, now 5/10 to its highest degree without pain meds. No hungry and feeling nauseas occasionally. Denies BM or flatus. Able to ambulate with help.         Vital Signs Last 24 Hrs  T(C): 36.6 (30 May 2022 05:30), Max: 37.4 (29 May 2022 12:46)  T(F): 97.9 (30 May 2022 05:30), Max: 99.3 (29 May 2022 12:46)  HR: 67 (30 May 2022 05:30) (62 - 67)  BP: 159/52 (30 May 2022 05:30) (143/60 - 159/52)  BP(mean): --  RR: 18 (30 May 2022 05:30) (17 - 18)  SpO2: 97% (30 May 2022 05:30) (96% - 97%)    I&O's Detail      Physical:  General aspect: AOx3, responsive and collaborative  Neuro: GCS 15  CVS: extremities warm and well perfused, bilateral radial pulses 2+.  Resp: no increased respiratory effort or sings of distress. Eupneic.  ABD: soft, appropriately tender, no rebound or guarding. ND. Incisions CDI               9.9    7.40  )-----------( 204      ( 30 May 2022 06:04 )             31.2       05-30    139  |  107  |  7   ----------------------------<  167<H>  4.0   |  26  |  0.99    Ca    8.6      30 May 2022 06:04  Phos  2.8     05-30  Mg     2.1     05-30    TPro  6.6  /  Alb  2.6<L>  /  TBili  0.2  /  DBili  x   /  AST  12  /  ALT  19  /  AlkPhos  38<L>  05-30              PLAN:  Will stay with clear liquid diet until pt. passes flatus

## 2022-05-31 RX ORDER — DEXTROSE MONOHYDRATE, SODIUM CHLORIDE, AND POTASSIUM CHLORIDE 50; .745; 4.5 G/1000ML; G/1000ML; G/1000ML
1000 INJECTION, SOLUTION INTRAVENOUS
Refills: 0 | Status: DISCONTINUED | OUTPATIENT
Start: 2022-05-31 | End: 2022-06-03

## 2022-05-31 RX ORDER — ACETAMINOPHEN 500 MG
1000 TABLET ORAL ONCE
Refills: 0 | Status: COMPLETED | OUTPATIENT
Start: 2022-05-31 | End: 2022-05-31

## 2022-05-31 RX ORDER — ACETAMINOPHEN 500 MG
1000 TABLET ORAL ONCE
Refills: 0 | Status: COMPLETED | OUTPATIENT
Start: 2022-06-01 | End: 2022-06-01

## 2022-05-31 RX ORDER — PANTOPRAZOLE SODIUM 20 MG/1
40 TABLET, DELAYED RELEASE ORAL EVERY 24 HOURS
Refills: 0 | Status: DISCONTINUED | OUTPATIENT
Start: 2022-05-31 | End: 2022-06-03

## 2022-05-31 RX ADMIN — HEPARIN SODIUM 5000 UNIT(S): 5000 INJECTION INTRAVENOUS; SUBCUTANEOUS at 05:48

## 2022-05-31 RX ADMIN — Medication 75 MICROGRAM(S): at 05:48

## 2022-05-31 RX ADMIN — Medication 2: at 22:07

## 2022-05-31 RX ADMIN — HYDROMORPHONE HYDROCHLORIDE 0.5 MILLIGRAM(S): 2 INJECTION INTRAMUSCULAR; INTRAVENOUS; SUBCUTANEOUS at 05:53

## 2022-05-31 RX ADMIN — HEPARIN SODIUM 5000 UNIT(S): 5000 INJECTION INTRAVENOUS; SUBCUTANEOUS at 22:06

## 2022-05-31 RX ADMIN — HEPARIN SODIUM 5000 UNIT(S): 5000 INJECTION INTRAVENOUS; SUBCUTANEOUS at 13:08

## 2022-05-31 RX ADMIN — Medication 2: at 11:38

## 2022-05-31 RX ADMIN — Medication 1000 MILLIGRAM(S): at 18:03

## 2022-05-31 RX ADMIN — Medication 100 MILLIGRAM(S): at 05:47

## 2022-05-31 RX ADMIN — Medication 400 MILLIGRAM(S): at 22:48

## 2022-05-31 RX ADMIN — Medication 400 MILLIGRAM(S): at 17:02

## 2022-05-31 RX ADMIN — Medication 1000 MILLIGRAM(S): at 23:22

## 2022-05-31 RX ADMIN — LOSARTAN POTASSIUM 50 MILLIGRAM(S): 100 TABLET, FILM COATED ORAL at 05:47

## 2022-05-31 RX ADMIN — PANTOPRAZOLE SODIUM 40 MILLIGRAM(S): 20 TABLET, DELAYED RELEASE ORAL at 11:17

## 2022-05-31 RX ADMIN — Medication 25 MILLIGRAM(S): at 05:48

## 2022-05-31 NOTE — PROGRESS NOTE ADULT - SUBJECTIVE AND OBJECTIVE BOX
Vital Signs Last 24 Hrs  T(C): 37.2 (31 May 2022 05:10), Max: 37.2 (31 May 2022 05:10)  T(F): 99 (31 May 2022 05:10), Max: 99 (31 May 2022 05:10)  HR: 69 (31 May 2022 05:10) (63 - 69)  BP: 185/52 (31 May 2022 05:10) (168/67 - 185/52)  BP(mean): --  RR: 17 (31 May 2022 05:10) (16 - 17)  SpO2: 97% (31 May 2022 05:10) (96% - 98%)    I&O's Detail                            9.9    7.40  )-----------( 204      ( 30 May 2022 06:04 )             31.2       05-30    139  |  107  |  7   ----------------------------<  167<H>  4.0   |  26  |  0.99    Ca    8.6      30 May 2022 06:04  Phos  2.8     05-30  Mg     2.1     05-30    TPro  6.6  /  Alb  2.6<L>  /  TBili  0.2  /  DBili  x   /  AST  12  /  ALT  19  /  AlkPhos  38<L>  05-30    PO intake still poor  No flatus yet          PLAN:  continue only clear liquids  Ambulate         Subjective:  Patient reports feeling better than yesterday. Still have nausea and avoiding food. Was OOB ambulating yesterday        Vital Signs Last 24 Hrs  T(C): 37.2 (31 May 2022 05:10), Max: 37.2 (31 May 2022 05:10)  T(F): 99 (31 May 2022 05:10), Max: 99 (31 May 2022 05:10)  HR: 69 (31 May 2022 05:10) (63 - 69)  BP: 185/52 (31 May 2022 05:10) (168/67 - 185/52)  BP(mean): --  RR: 17 (31 May 2022 05:10) (16 - 17)  SpO2: 97% (31 May 2022 05:10) (96% - 98%)    I&O's Detail      Physical:  General aspect: AOx3, responsive and collaborative  Neuro: GCS 15  CVS: extremities warm and well perfused, bilateral radial pulses 2+.  Resp: no increased respiratory effort or sings of distress. Eupneic.  ABD: soft, appropriately tender, no rebound or guarding. ND. Incisions CDI                     9.9    7.40  )-----------( 204      ( 30 May 2022 06:04 )             31.2       05-30    139  |  107  |  7   ----------------------------<  167<H>  4.0   |  26  |  0.99    Ca    8.6      30 May 2022 06:04  Phos  2.8     05-30  Mg     2.1     05-30    TPro  6.6  /  Alb  2.6<L>  /  TBili  0.2  /  DBili  x   /  AST  12  /  ALT  19  /  AlkPhos  38<L>  05-30    PO intake still poor  No flatus yet          PLAN:  continue only clear liquids  Ambulate

## 2022-06-01 LAB
ANION GAP SERPL CALC-SCNC: 6 MMOL/L — SIGNIFICANT CHANGE UP (ref 5–17)
BUN SERPL-MCNC: 6 MG/DL — LOW (ref 7–18)
CALCIUM SERPL-MCNC: 8.8 MG/DL — SIGNIFICANT CHANGE UP (ref 8.4–10.5)
CHLORIDE SERPL-SCNC: 109 MMOL/L — HIGH (ref 96–108)
CO2 SERPL-SCNC: 25 MMOL/L — SIGNIFICANT CHANGE UP (ref 22–31)
CREAT SERPL-MCNC: 0.91 MG/DL — SIGNIFICANT CHANGE UP (ref 0.5–1.3)
EGFR: 66 ML/MIN/1.73M2 — SIGNIFICANT CHANGE UP
GLUCOSE SERPL-MCNC: 194 MG/DL — HIGH (ref 70–99)
HCT VFR BLD CALC: 29 % — LOW (ref 34.5–45)
HGB BLD-MCNC: 9.3 G/DL — LOW (ref 11.5–15.5)
MAGNESIUM SERPL-MCNC: 1.9 MG/DL — SIGNIFICANT CHANGE UP (ref 1.6–2.6)
MCHC RBC-ENTMCNC: 30.3 PG — SIGNIFICANT CHANGE UP (ref 27–34)
MCHC RBC-ENTMCNC: 32.1 GM/DL — SIGNIFICANT CHANGE UP (ref 32–36)
MCV RBC AUTO: 94.5 FL — SIGNIFICANT CHANGE UP (ref 80–100)
NRBC # BLD: 0 /100 WBCS — SIGNIFICANT CHANGE UP (ref 0–0)
PHOSPHATE SERPL-MCNC: 2.5 MG/DL — SIGNIFICANT CHANGE UP (ref 2.5–4.5)
PLATELET # BLD AUTO: 196 K/UL — SIGNIFICANT CHANGE UP (ref 150–400)
POTASSIUM SERPL-MCNC: 4.1 MMOL/L — SIGNIFICANT CHANGE UP (ref 3.5–5.3)
POTASSIUM SERPL-SCNC: 4.1 MMOL/L — SIGNIFICANT CHANGE UP (ref 3.5–5.3)
RBC # BLD: 3.07 M/UL — LOW (ref 3.8–5.2)
RBC # FLD: 20.8 % — HIGH (ref 10.3–14.5)
SARS-COV-2 RNA SPEC QL NAA+PROBE: SIGNIFICANT CHANGE UP
SODIUM SERPL-SCNC: 140 MMOL/L — SIGNIFICANT CHANGE UP (ref 135–145)
WBC # BLD: 3.93 K/UL — SIGNIFICANT CHANGE UP (ref 3.8–10.5)
WBC # FLD AUTO: 3.93 K/UL — SIGNIFICANT CHANGE UP (ref 3.8–10.5)

## 2022-06-01 RX ORDER — ONDANSETRON 8 MG/1
4 TABLET, FILM COATED ORAL EVERY 8 HOURS
Refills: 0 | Status: DISCONTINUED | OUTPATIENT
Start: 2022-06-01 | End: 2022-06-03

## 2022-06-01 RX ADMIN — PANTOPRAZOLE SODIUM 40 MILLIGRAM(S): 20 TABLET, DELAYED RELEASE ORAL at 12:37

## 2022-06-01 RX ADMIN — Medication 400 MILLIGRAM(S): at 03:18

## 2022-06-01 RX ADMIN — Medication 1000 MILLIGRAM(S): at 03:50

## 2022-06-01 RX ADMIN — Medication 100 MILLIGRAM(S): at 05:55

## 2022-06-01 RX ADMIN — HEPARIN SODIUM 5000 UNIT(S): 5000 INJECTION INTRAVENOUS; SUBCUTANEOUS at 05:54

## 2022-06-01 RX ADMIN — Medication 25 MILLIGRAM(S): at 05:54

## 2022-06-01 RX ADMIN — LOSARTAN POTASSIUM 50 MILLIGRAM(S): 100 TABLET, FILM COATED ORAL at 05:55

## 2022-06-01 RX ADMIN — Medication 75 MICROGRAM(S): at 05:55

## 2022-06-01 RX ADMIN — HEPARIN SODIUM 5000 UNIT(S): 5000 INJECTION INTRAVENOUS; SUBCUTANEOUS at 15:15

## 2022-06-02 ENCOUNTER — TRANSCRIPTION ENCOUNTER (OUTPATIENT)
Age: 77
End: 2022-06-02

## 2022-06-02 LAB
ANION GAP SERPL CALC-SCNC: 4 MMOL/L — LOW (ref 5–17)
BUN SERPL-MCNC: 6 MG/DL — LOW (ref 7–18)
CALCIUM SERPL-MCNC: 8.9 MG/DL — SIGNIFICANT CHANGE UP (ref 8.4–10.5)
CHLORIDE SERPL-SCNC: 108 MMOL/L — SIGNIFICANT CHANGE UP (ref 96–108)
CO2 SERPL-SCNC: 27 MMOL/L — SIGNIFICANT CHANGE UP (ref 22–31)
CREAT SERPL-MCNC: 0.93 MG/DL — SIGNIFICANT CHANGE UP (ref 0.5–1.3)
EGFR: 64 ML/MIN/1.73M2 — SIGNIFICANT CHANGE UP
GLUCOSE SERPL-MCNC: 190 MG/DL — HIGH (ref 70–99)
HCT VFR BLD CALC: 29.1 % — LOW (ref 34.5–45)
HGB BLD-MCNC: 9.3 G/DL — LOW (ref 11.5–15.5)
MAGNESIUM SERPL-MCNC: 1.9 MG/DL — SIGNIFICANT CHANGE UP (ref 1.6–2.6)
MCHC RBC-ENTMCNC: 29.6 PG — SIGNIFICANT CHANGE UP (ref 27–34)
MCHC RBC-ENTMCNC: 32 GM/DL — SIGNIFICANT CHANGE UP (ref 32–36)
MCV RBC AUTO: 92.7 FL — SIGNIFICANT CHANGE UP (ref 80–100)
NRBC # BLD: 0 /100 WBCS — SIGNIFICANT CHANGE UP (ref 0–0)
PHOSPHATE SERPL-MCNC: 2.7 MG/DL — SIGNIFICANT CHANGE UP (ref 2.5–4.5)
PLATELET # BLD AUTO: 217 K/UL — SIGNIFICANT CHANGE UP (ref 150–400)
POTASSIUM SERPL-MCNC: 4.2 MMOL/L — SIGNIFICANT CHANGE UP (ref 3.5–5.3)
POTASSIUM SERPL-SCNC: 4.2 MMOL/L — SIGNIFICANT CHANGE UP (ref 3.5–5.3)
RBC # BLD: 3.14 M/UL — LOW (ref 3.8–5.2)
RBC # FLD: 20 % — HIGH (ref 10.3–14.5)
SODIUM SERPL-SCNC: 139 MMOL/L — SIGNIFICANT CHANGE UP (ref 135–145)
WBC # BLD: 3.65 K/UL — LOW (ref 3.8–10.5)
WBC # FLD AUTO: 3.65 K/UL — LOW (ref 3.8–10.5)

## 2022-06-02 RX ORDER — FENOFIBRATE,MICRONIZED 130 MG
145 CAPSULE ORAL DAILY
Refills: 0 | Status: DISCONTINUED | OUTPATIENT
Start: 2022-06-02 | End: 2022-06-03

## 2022-06-02 RX ORDER — LORATADINE 10 MG/1
10 TABLET ORAL DAILY
Refills: 0 | Status: DISCONTINUED | OUTPATIENT
Start: 2022-06-02 | End: 2022-06-03

## 2022-06-02 RX ORDER — DONEPEZIL HYDROCHLORIDE 10 MG/1
10 TABLET, FILM COATED ORAL AT BEDTIME
Refills: 0 | Status: DISCONTINUED | OUTPATIENT
Start: 2022-06-02 | End: 2022-06-03

## 2022-06-02 RX ORDER — HYDRALAZINE HCL 50 MG
10 TABLET ORAL ONCE
Refills: 0 | Status: COMPLETED | OUTPATIENT
Start: 2022-06-02 | End: 2022-06-02

## 2022-06-02 RX ORDER — SIMETHICONE 80 MG/1
80 TABLET, CHEWABLE ORAL ONCE
Refills: 0 | Status: COMPLETED | OUTPATIENT
Start: 2022-06-02 | End: 2022-06-03

## 2022-06-02 RX ORDER — SIMETHICONE 80 MG/1
80 TABLET, CHEWABLE ORAL EVERY 6 HOURS
Refills: 0 | Status: DISCONTINUED | OUTPATIENT
Start: 2022-06-02 | End: 2022-06-03

## 2022-06-02 RX ORDER — ERGOCALCIFEROL 1.25 MG/1
50000 CAPSULE ORAL
Refills: 0 | Status: DISCONTINUED | OUTPATIENT
Start: 2022-06-02 | End: 2022-06-03

## 2022-06-02 RX ORDER — RIVAROXABAN 15 MG-20MG
20 KIT ORAL
Refills: 0 | Status: DISCONTINUED | OUTPATIENT
Start: 2022-06-02 | End: 2022-06-03

## 2022-06-02 RX ORDER — METFORMIN HYDROCHLORIDE 850 MG/1
500 TABLET ORAL
Refills: 0 | Status: DISCONTINUED | OUTPATIENT
Start: 2022-06-02 | End: 2022-06-03

## 2022-06-02 RX ORDER — GABAPENTIN 400 MG/1
100 CAPSULE ORAL THREE TIMES A DAY
Refills: 0 | Status: DISCONTINUED | OUTPATIENT
Start: 2022-06-02 | End: 2022-06-03

## 2022-06-02 RX ORDER — ATORVASTATIN CALCIUM 80 MG/1
40 TABLET, FILM COATED ORAL AT BEDTIME
Refills: 0 | Status: DISCONTINUED | OUTPATIENT
Start: 2022-06-02 | End: 2022-06-03

## 2022-06-02 RX ADMIN — ATORVASTATIN CALCIUM 40 MILLIGRAM(S): 80 TABLET, FILM COATED ORAL at 22:12

## 2022-06-02 RX ADMIN — Medication 10 MILLIGRAM(S): at 15:45

## 2022-06-02 RX ADMIN — Medication 4: at 12:15

## 2022-06-02 RX ADMIN — Medication 2: at 22:10

## 2022-06-02 RX ADMIN — Medication 25 MILLIGRAM(S): at 06:26

## 2022-06-02 RX ADMIN — DONEPEZIL HYDROCHLORIDE 10 MILLIGRAM(S): 10 TABLET, FILM COATED ORAL at 22:15

## 2022-06-02 RX ADMIN — GABAPENTIN 100 MILLIGRAM(S): 400 CAPSULE ORAL at 22:12

## 2022-06-02 RX ADMIN — METFORMIN HYDROCHLORIDE 500 MILLIGRAM(S): 850 TABLET ORAL at 18:43

## 2022-06-02 RX ADMIN — GABAPENTIN 100 MILLIGRAM(S): 400 CAPSULE ORAL at 16:06

## 2022-06-02 RX ADMIN — HEPARIN SODIUM 5000 UNIT(S): 5000 INJECTION INTRAVENOUS; SUBCUTANEOUS at 06:26

## 2022-06-02 RX ADMIN — HEPARIN SODIUM 5000 UNIT(S): 5000 INJECTION INTRAVENOUS; SUBCUTANEOUS at 16:11

## 2022-06-02 RX ADMIN — LOSARTAN POTASSIUM 50 MILLIGRAM(S): 100 TABLET, FILM COATED ORAL at 06:26

## 2022-06-02 RX ADMIN — Medication 75 MICROGRAM(S): at 06:26

## 2022-06-02 RX ADMIN — PANTOPRAZOLE SODIUM 40 MILLIGRAM(S): 20 TABLET, DELAYED RELEASE ORAL at 16:05

## 2022-06-02 RX ADMIN — Medication 100 MILLIGRAM(S): at 06:26

## 2022-06-02 RX ADMIN — RIVAROXABAN 20 MILLIGRAM(S): KIT at 16:41

## 2022-06-02 RX ADMIN — HYDROMORPHONE HYDROCHLORIDE 0.5 MILLIGRAM(S): 2 INJECTION INTRAMUSCULAR; INTRAVENOUS; SUBCUTANEOUS at 12:47

## 2022-06-02 RX ADMIN — HYDROMORPHONE HYDROCHLORIDE 0.5 MILLIGRAM(S): 2 INJECTION INTRAMUSCULAR; INTRAVENOUS; SUBCUTANEOUS at 12:17

## 2022-06-02 NOTE — DISCHARGE NOTE PROVIDER - NSDCFUADDINST_GEN_ALL_CORE_FT
General Discharge Instructions:  Please resume all regular home medications unless specifically advised not to take a particular medication. Also, please take any new medications as prescribed.  Please get plenty of rest, continue to ambulate several times per day, and drink adequate amounts of fluids. Avoid lifting weights greater than 5-10 lbs until you follow-up with your surgeon, who will instruct you further regarding activity restrictions.  Avoid driving or operating heavy machinery while taking pain medications.  Please follow-up with your surgeon and Primary Care Provider (PCP).  Incision Care:  *Please call your doctor or nurse practitioner if you have increased pain, swelling, redness, or drainage from the incision site.  *Avoid swimming and baths until your follow-up appointment.  *You may shower, and wash surgical incisions with a mild soap and warm water. Gently pat the area dry. Never scrub the incisions.  *If you have staples, they will be removed at your follow-up appointment.  *If you have steri-strips, they will fall off on their own, if they do not fall of please remove them 7-10 days after surgery.    Warning Signs:  Please call your doctor or nurse practitioner if you experience the following:  *You experience new chest pain, pressure, squeezing or tightness.  *New or worsening cough, shortness of breath, or wheeze.  *If you are vomiting and cannot keep down fluids or your medications.  *You are getting dehydrated due to continued vomiting, diarrhea, or other reasons. Signs of dehydration include dry mouth, rapid heartbeat, or feeling dizzy or faint when standing.  *You see blood or dark/black material when you vomit or have a bowel movement.  *You experience burning when you urinate, have blood in your urine, or experience a discharge.  *Your pain is not improving within 8-12 hours or is not gone within 24 hours. Call or return immediately if your pain is getting worse, changes location, or moves to your chest or back.  *You have shaking chills, or fever greater than 101.5 degrees Fahrenheit or 38 degrees Celsius.  *Any change in your symptoms, or any new symptoms that concern you.   No need to cover the incisions  Continue diet with low fiber   Continue taking the Xarelto to prevent blood clot and A-fib.  Remove the steri-strips in 3 days      General Discharge Instructions:  Please resume all regular home medications unless specifically advised not to take a particular medication. Also, please take any new medications as prescribed.  Please get plenty of rest, continue to ambulate several times per day, and drink adequate amounts of fluids. Avoid lifting weights greater than 5-10 lbs until you follow-up with your surgeon, who will instruct you further regarding activity restrictions.  Avoid driving or operating heavy machinery while taking pain medications.  Please follow-up with your surgeon and Primary Care Provider (PCP).  Incision Care:  *Please call your doctor or nurse practitioner if you have increased pain, swelling, redness, or drainage from the incision site.  *Avoid swimming and baths until your follow-up appointment.  *You may shower, and wash surgical incisions with a mild soap and warm water. Gently pat the area dry. Never scrub the incisions.      Warning Signs:  Please call your doctor or nurse practitioner if you experience the following:  *You experience new chest pain, pressure, squeezing or tightness.  *New or worsening cough, shortness of breath, or wheeze.  *If you are vomiting and cannot keep down fluids or your medications.  *You are getting dehydrated due to continued vomiting, diarrhea, or other reasons. Signs of dehydration include dry mouth, rapid heartbeat, or feeling dizzy or faint when standing.  *You see blood or dark/black material when you vomit or have a bowel movement.  *You experience burning when you urinate, have blood in your urine, or experience a discharge.  *Your pain is not improving within 8-12 hours or is not gone within 24 hours. Call or return immediately if your pain is getting worse, changes location, or moves to your chest or back.  *You have shaking chills, or fever greater than 101.5 degrees Fahrenheit or 38 degrees Celsius.  *Any change in your symptoms, or any new symptoms that concern you.    Follow low fiber diet/low residue   - Vegetables should initially be cooked (backed, steamed, blanched)  - May want to start with peeled and/or canned foods   - Limit fat/oil intake and fried/greasy foods  - Add small amounts of fiber back in to the diet every couple days.

## 2022-06-02 NOTE — PROGRESS NOTE ADULT - SUBJECTIVE AND OBJECTIVE BOX
Vital Signs Last 24 Hrs  T(C): 37 (02 Jun 2022 06:19), Max: 37 (02 Jun 2022 06:19)  T(F): 98.6 (02 Jun 2022 06:19), Max: 98.6 (02 Jun 2022 06:19)  HR: 66 (02 Jun 2022 06:19) (65 - 72)  BP: 189/61 (02 Jun 2022 06:19) (161/53 - 189/61)  BP(mean): --  RR: 18 (02 Jun 2022 06:19) (16 - 18)  SpO2: 98% (02 Jun 2022 06:19) (95% - 98%)    I&O's Detail                            9.3    3.65  )-----------( 217      ( 02 Jun 2022 06:19 )             29.1       06-02    139  |  108  |  6<L>  ----------------------------<  190<H>  4.2   |  27  |  0.93    Ca    8.9      02 Jun 2022 06:19  Phos  2.7     06-02  Mg     1.9     06-02    Prevena in place  tolerating diet            PLAN:  discharge home tomorrow       Vital Signs Last 24 Hrs  T(C): 37 (02 Jun 2022 06:19), Max: 37 (02 Jun 2022 06:19)  T(F): 98.6 (02 Jun 2022 06:19), Max: 98.6 (02 Jun 2022 06:19)  HR: 66 (02 Jun 2022 06:19) (65 - 72)  BP: 189/61 (02 Jun 2022 06:19) (161/53 - 189/61)  BP(mean): --  RR: 18 (02 Jun 2022 06:19) (16 - 18)  SpO2: 98% (02 Jun 2022 06:19) (95% - 98%)    I&O's Detail                            9.3    3.65  )-----------( 217      ( 02 Jun 2022 06:19 )             29.1       06-02    139  |  108  |  6<L>  ----------------------------<  190<H>  4.2   |  27  |  0.93    Ca    8.9      02 Jun 2022 06:19  Phos  2.7     06-02  Mg     1.9     06-02    incisions clear  tolerating diet            PLAN:  discharge home tomorrow

## 2022-06-02 NOTE — DISCHARGE NOTE PROVIDER - NSDCCPTREATMENT_GEN_ALL_CORE_FT
PRINCIPAL PROCEDURE  Procedure: Laparoscopic extended right hemicolectomy  Findings and Treatment:

## 2022-06-02 NOTE — PROGRESS NOTE ADULT - SUBJECTIVE AND OBJECTIVE BOX
Pt known to our service, pt of Dr Rollins. 76year old  female accompanied by daughter and HHA with pmhx of Afib on Xarelto with micra transcatheter pacemaker implanted 7/17/2017, hypertension (hydralazine, losartan, furosemide), Mixed hyperlipidemia (fenofibrate, rosuvastatin), osteoporosis, T2DM, CAD- s/p CABG in 2017, OA, left breast malignant neoplasm - s/p left breast total mastectomy, bilateral eye cataract, hypothyroidism, Alzheimer, anemia and seasonal allergies presents with newly diagnosed colon cancer. found to have hepatic flexure mass, now s/p robotic assisted colon resection on 5/27.     She is feeling "so-so," no pain, ambulated, no specific complaints.       PAST MEDICAL & SURGICAL HISTORY:  Atrial fibrillation and flutter      CAD (coronary artery disease)      Hypertension      DM (diabetes mellitus)      Hypothyroidism      Cataract      Breast cancer  left breast      Anemia      Mixed hyperlipidemia      Osteoporosis      Osteopenia      S/P CABG x 1      Cardiac pacemaker  7/17/2017      S/P cardiac catheterization  5/10/2022      History of left mastectomy          FAMILY HISTORY:      Alochol: Denied  Smoking: Nonsmoker  Drug Use: Denied  Marital Status:         Allergies    No Known Allergies    Intolerances        MEDICATIONS  (STANDING):  acetaminophen   IVPB .. 1000 milliGRAM(s) IV Intermittent once  dextrose 5% + sodium chloride 0.45% with potassium chloride 20 mEq/L 1000 milliLiter(s) (50 mL/Hr) IV Continuous <Continuous>  dextrose 5%. 1000 milliLiter(s) (100 mL/Hr) IV Continuous <Continuous>  dextrose 5%. 1000 milliLiter(s) (50 mL/Hr) IV Continuous <Continuous>  dextrose 50% Injectable 25 Gram(s) IV Push once  dextrose 50% Injectable 12.5 Gram(s) IV Push once  dextrose 50% Injectable 25 Gram(s) IV Push once  glucagon  Injectable 1 milliGRAM(s) IntraMuscular once  heparin   Injectable 5000 Unit(s) SubCutaneous every 8 hours  hydrALAZINE 25 milliGRAM(s) Oral daily  insulin lispro (ADMELOG) corrective regimen sliding scale   SubCutaneous Before meals and at bedtime  levothyroxine 75 MICROGram(s) Oral daily  losartan 50 milliGRAM(s) Oral daily  metoprolol succinate  milliGRAM(s) Oral daily  pantoprazole  Injectable 40 milliGRAM(s) IV Push every 24 hours  simethicone 80 milliGRAM(s) Chew once    MEDICATIONS  (PRN):  dextrose Oral Gel 15 Gram(s) Oral once PRN Blood Glucose LESS THAN 70 milliGRAM(s)/deciliter  HYDROmorphone  Injectable 0.5 milliGRAM(s) IV Push every 4 hours PRN Severe Pain (7 - 10)  ondansetron Injectable 4 milliGRAM(s) IV Push every 8 hours PRN Nausea and/or Vomiting  simethicone 80 milliGRAM(s) Chew every 6 hours PRN Gas      ROS  limited 2/2 participation, no CP/SOB/abd pain    T(C): 37 (06-02-22 @ 06:19), Max: 37 (06-02-22 @ 06:19)  HR: 66 (06-02-22 @ 06:19) (65 - 72)  BP: 189/61 (06-02-22 @ 06:19) (161/53 - 189/61)  RR: 18 (06-02-22 @ 06:19) (16 - 18)  SpO2: 98% (06-02-22 @ 06:19) (95% - 98%)  Wt(kg): --    PE  NAD  Awake, alert  Anicteric  limited 2/2 participation, covid pandemic                          9.3    3.65  )-----------( 217      ( 02 Jun 2022 06:19 )             29.1       06-02    139  |  108  |  6<L>  ----------------------------<  190<H>  4.2   |  27  |  0.93    Ca    8.9      02 Jun 2022 06:19  Phos  2.7     06-02  Mg     1.9     06-02

## 2022-06-02 NOTE — DISCHARGE NOTE PROVIDER - NS AS DC PROVIDER CONTACT Y/N MULTI
[FreeTextEntry1] : Evaluation of growths [de-identified] : Followup visit for 52-year-old white male last seen by me on September 26, 2019, for evaluation of growths.  Particular concern about a lesion on the left anterior scalp. 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 Yes

## 2022-06-02 NOTE — DISCHARGE NOTE PROVIDER - NSDCCPCAREPLAN_GEN_ALL_CORE_FT
PRINCIPAL DISCHARGE DIAGNOSIS  Diagnosis: Colonic mass  Assessment and Plan of Treatment:        PRINCIPAL DISCHARGE DIAGNOSIS  Diagnosis: Colonic mass  Assessment and Plan of Treatment: Planning discharge home with Hematology and surgical oncology FU. Will undergo adjuvant chemotherapy with Heme/Onc. Will continue home xarelto.

## 2022-06-02 NOTE — DISCHARGE NOTE PROVIDER - PROVIDER TOKENS
PROVIDER:[TOKEN:[[id number]]] PROVIDER:[TOKEN:[[id number]]],PROVIDER:[TOKEN:[[id number]],FOLLOWUP:[2 weeks],ESTABLISHEDPATIENT:[T]]

## 2022-06-02 NOTE — DISCHARGE NOTE PROVIDER - HOSPITAL COURSE
patient was admitted preop with plan for right colectomy. she underwent robotic-assisted extended right colectomy for adenocarcinoma. postoperatively patient recovered as expected; return of bowel function and progressively advanced to mechanical soft diet. patient was assessed by physical therapy and recommended for home PT. 76F, admitted preop with plan for right colectomy. She underwent s/p laparoscopic extended right hemicolectomy. Pathology reports shows Stage 3 colon cancer. pT3: Tumor invades through the muscularis propria into leela-colorectal tissues. pN1b: Two or three regional lymph nodes are positive. Postoperatively patient recovered as expected; return of bowel function and progressively advanced to mechanical soft diet. Patient was assessed by physical therapy and recommended for home PT.   Planning discharge home with Hematology and surgical oncology FU. Will undergo adjuvant chemotherapy with Heme/Onc. Will continue home xarelto.

## 2022-06-02 NOTE — DISCHARGE NOTE PROVIDER - CARE PROVIDERS DIRECT ADDRESSES
,[email or web address]

## 2022-06-02 NOTE — DISCHARGE NOTE PROVIDER - CARE PROVIDER_API CALL
Catrachito Zabala)  Surgery  [address] [facility], Division [place] [address]  Phone: [phone]  Fax: [phone]  Follow Up Time:    Catrachito Zabala)  Surgery  [address] [facility], [place] [address]  Phone: [phone]  Fax: [phone]  Follow Up Time:     Mook Rollins)  Hematology; Medical Oncology  [address] [facility] [address] [place] [address]  Phone: [phone]  Fax: [phone]  Established Patient  Follow Up Time: 2 weeks

## 2022-06-02 NOTE — DISCHARGE NOTE PROVIDER - NSDCMRMEDTOKEN_GEN_ALL_CORE_FT
acetaminophen 650 mg oral tablet, extended release: 2 tab(s) orally every 8 hours  ALENDRONATE SODIUM 70MG TAB: tab(s) orally once a week  RAH LEVOTHYROXINE SODIUM 75MCG TAB: tab(s) orally once a day  docusate sodium 100 mg oral tablet: 1 tab(s) orally 3 times a day  donepezil 10 mg oral tablet: 1 tab(s) orally once a day (at bedtime)  fenofibrate 145 mg oral tablet: 1 tab(s) orally once a day  ferrous sulfate 325 mg (65 mg elemental iron) oral tablet: 1 tab(s) orally 3 times a day  GABAPENTIN 100MG CAP: cap(s) orally 3 times a day  HYDRALAZINE HYDROCHLORIDE 25MG TAB: tab(s) orally once a day  JANUVIA 100MG TAB: tab(s) orally once a day  LEVEMIR FLEXTOUCH 100U/ML PEN: 35units   loratadine 10 mg oral tablet: 1 tab(s) orally once a day  LOSARTAN POTASSIUM 50MG TAB: tab(s) orally once a day  METFORMIN HYDROCHLORIDE 1000MG TAB:   METOPROLOL SUCCINATE ER 100MG ER TAB: tab(s) orally once a day  ROSUVASTATIN CALCIUM 10MG TAB: tab(s) orally once a day (at bedtime)  Tab-A-Giselle oral tablet: 1 tab(s) orally once a day  Vitamin D2 1.25 mg (50,000 intl units) oral capsule: 1 cap(s) orally once a week  XARELTO 20MG TAB: tab(s) orally once a day   acetaminophen 650 mg oral tablet, extended release: 2 tab(s) orally every 8 hours  ALENDRONATE SODIUM 70MG TAB: tab(s) orally once a week  RAH LEVOTHYROXINE SODIUM 75MCG TAB: tab(s) orally once a day  atorvastatin 40 mg oral tablet: 1 tab(s) orally once a day (at bedtime)  docusate sodium 100 mg oral tablet: 1 tab(s) orally 3 times a day  donepezil 10 mg oral tablet: 1 tab(s) orally once a day (at bedtime)  ferrous sulfate 325 mg (65 mg elemental iron) oral tablet: 1 tab(s) orally 3 times a day  GABAPENTIN 100MG CAP: cap(s) orally 3 times a day  Home physical therapy - 3 times a week :   HYDRALAZINE HYDROCHLORIDE 25MG TAB: tab(s) orally once a day  JANUVIA 100MG TAB: tab(s) orally once a day  LEVEMIR FLEXTOUCH 100U/ML PEN: 35units   loratadine 10 mg oral tablet: 1 tab(s) orally once a day  LOSARTAN POTASSIUM 50MG TAB: tab(s) orally once a day  METFORMIN HYDROCHLORIDE 1000MG TAB: 1 tab(s) orally once a day   METOPROLOL SUCCINATE ER 100MG ER TAB: tab(s) orally once a day  oxyCODONE 5 mg oral tablet: 1 tab(s) orally every 6 hours, As Needed MDD:max 4 tabs a day   Tab-A-Giselle oral tablet: 1 tab(s) orally once a day  Vitamin D2 1.25 mg (50,000 intl units) oral capsule: 1 cap(s) orally once a week  XARELTO 20MG TAB: 1 tab(s) orally once a day

## 2022-06-03 ENCOUNTER — TRANSCRIPTION ENCOUNTER (OUTPATIENT)
Age: 77
End: 2022-06-03

## 2022-06-03 VITALS
HEART RATE: 64 BPM | RESPIRATION RATE: 18 BRPM | DIASTOLIC BLOOD PRESSURE: 59 MMHG | SYSTOLIC BLOOD PRESSURE: 176 MMHG | TEMPERATURE: 98 F | OXYGEN SATURATION: 99 %

## 2022-06-03 LAB
ANION GAP SERPL CALC-SCNC: 5 MMOL/L — SIGNIFICANT CHANGE UP (ref 5–17)
BUN SERPL-MCNC: 10 MG/DL — SIGNIFICANT CHANGE UP (ref 7–18)
CALCIUM SERPL-MCNC: 8.6 MG/DL — SIGNIFICANT CHANGE UP (ref 8.4–10.5)
CHLORIDE SERPL-SCNC: 108 MMOL/L — SIGNIFICANT CHANGE UP (ref 96–108)
CO2 SERPL-SCNC: 27 MMOL/L — SIGNIFICANT CHANGE UP (ref 22–31)
CREAT SERPL-MCNC: 0.96 MG/DL — SIGNIFICANT CHANGE UP (ref 0.5–1.3)
EGFR: 62 ML/MIN/1.73M2 — SIGNIFICANT CHANGE UP
GLUCOSE SERPL-MCNC: 202 MG/DL — HIGH (ref 70–99)
HCT VFR BLD CALC: 29.1 % — LOW (ref 34.5–45)
HGB BLD-MCNC: 9.2 G/DL — LOW (ref 11.5–15.5)
MAGNESIUM SERPL-MCNC: 1.9 MG/DL — SIGNIFICANT CHANGE UP (ref 1.6–2.6)
MCHC RBC-ENTMCNC: 29.5 PG — SIGNIFICANT CHANGE UP (ref 27–34)
MCHC RBC-ENTMCNC: 31.6 GM/DL — LOW (ref 32–36)
MCV RBC AUTO: 93.3 FL — SIGNIFICANT CHANGE UP (ref 80–100)
NRBC # BLD: 0 /100 WBCS — SIGNIFICANT CHANGE UP (ref 0–0)
PHOSPHATE SERPL-MCNC: 3.2 MG/DL — SIGNIFICANT CHANGE UP (ref 2.5–4.5)
PLATELET # BLD AUTO: 218 K/UL — SIGNIFICANT CHANGE UP (ref 150–400)
POTASSIUM SERPL-MCNC: 4 MMOL/L — SIGNIFICANT CHANGE UP (ref 3.5–5.3)
POTASSIUM SERPL-SCNC: 4 MMOL/L — SIGNIFICANT CHANGE UP (ref 3.5–5.3)
RBC # BLD: 3.12 M/UL — LOW (ref 3.8–5.2)
RBC # FLD: 20.2 % — HIGH (ref 10.3–14.5)
SODIUM SERPL-SCNC: 140 MMOL/L — SIGNIFICANT CHANGE UP (ref 135–145)
WBC # BLD: 3.76 K/UL — LOW (ref 3.8–10.5)
WBC # FLD AUTO: 3.76 K/UL — LOW (ref 3.8–10.5)

## 2022-06-03 PROCEDURE — 88309 TISSUE EXAM BY PATHOLOGIST: CPT

## 2022-06-03 PROCEDURE — P9040: CPT

## 2022-06-03 PROCEDURE — 87635 SARS-COV-2 COVID-19 AMP PRB: CPT

## 2022-06-03 PROCEDURE — 36415 COLL VENOUS BLD VENIPUNCTURE: CPT

## 2022-06-03 PROCEDURE — 86923 COMPATIBILITY TEST ELECTRIC: CPT

## 2022-06-03 PROCEDURE — 86901 BLOOD TYPING SEROLOGIC RH(D): CPT

## 2022-06-03 PROCEDURE — 97162 PT EVAL MOD COMPLEX 30 MIN: CPT

## 2022-06-03 PROCEDURE — 84100 ASSAY OF PHOSPHORUS: CPT

## 2022-06-03 PROCEDURE — 86850 RBC ANTIBODY SCREEN: CPT

## 2022-06-03 PROCEDURE — 86900 BLOOD TYPING SEROLOGIC ABO: CPT

## 2022-06-03 PROCEDURE — 99285 EMERGENCY DEPT VISIT HI MDM: CPT | Mod: 25

## 2022-06-03 PROCEDURE — 80053 COMPREHEN METABOLIC PANEL: CPT

## 2022-06-03 PROCEDURE — 83735 ASSAY OF MAGNESIUM: CPT

## 2022-06-03 PROCEDURE — 36430 TRANSFUSION BLD/BLD COMPNT: CPT

## 2022-06-03 PROCEDURE — 97116 GAIT TRAINING THERAPY: CPT

## 2022-06-03 PROCEDURE — 80048 BASIC METABOLIC PNL TOTAL CA: CPT

## 2022-06-03 PROCEDURE — 85025 COMPLETE CBC W/AUTO DIFF WBC: CPT

## 2022-06-03 PROCEDURE — 93005 ELECTROCARDIOGRAM TRACING: CPT

## 2022-06-03 PROCEDURE — C1889: CPT

## 2022-06-03 PROCEDURE — 82962 GLUCOSE BLOOD TEST: CPT

## 2022-06-03 PROCEDURE — 85730 THROMBOPLASTIN TIME PARTIAL: CPT

## 2022-06-03 PROCEDURE — 97110 THERAPEUTIC EXERCISES: CPT

## 2022-06-03 PROCEDURE — 85610 PROTHROMBIN TIME: CPT

## 2022-06-03 PROCEDURE — 85027 COMPLETE CBC AUTOMATED: CPT

## 2022-06-03 RX ORDER — FENOFIBRATE,MICRONIZED 130 MG
1 CAPSULE ORAL
Qty: 0 | Refills: 0 | DISCHARGE

## 2022-06-03 RX ORDER — METFORMIN HYDROCHLORIDE 850 MG/1
0 TABLET ORAL
Qty: 0 | Refills: 0 | DISCHARGE

## 2022-06-03 RX ORDER — METFORMIN HYDROCHLORIDE 850 MG/1
1 TABLET ORAL
Qty: 30 | Refills: 2
Start: 2022-06-03 | End: 2022-08-31

## 2022-06-03 RX ORDER — RIVAROXABAN 15 MG-20MG
1 KIT ORAL
Qty: 90 | Refills: 1
Start: 2022-06-03 | End: 2022-11-29

## 2022-06-03 RX ORDER — MAGNESIUM OXIDE 400 MG ORAL TABLET 241.3 MG
400 TABLET ORAL ONCE
Refills: 0 | Status: COMPLETED | OUTPATIENT
Start: 2022-06-03 | End: 2022-06-03

## 2022-06-03 RX ORDER — OXYCODONE HYDROCHLORIDE 5 MG/1
1 TABLET ORAL
Qty: 28 | Refills: 0
Start: 2022-06-03 | End: 2022-06-09

## 2022-06-03 RX ORDER — ROSUVASTATIN CALCIUM 5 MG/1
0 TABLET ORAL
Qty: 0 | Refills: 0 | DISCHARGE

## 2022-06-03 RX ORDER — ATORVASTATIN CALCIUM 80 MG/1
1 TABLET, FILM COATED ORAL
Qty: 0 | Refills: 0 | DISCHARGE
Start: 2022-06-03

## 2022-06-03 RX ORDER — RIVAROXABAN 15 MG-20MG
0 KIT ORAL
Qty: 0 | Refills: 0 | DISCHARGE

## 2022-06-03 RX ADMIN — PANTOPRAZOLE SODIUM 40 MILLIGRAM(S): 20 TABLET, DELAYED RELEASE ORAL at 11:07

## 2022-06-03 RX ADMIN — Medication 75 MICROGRAM(S): at 05:57

## 2022-06-03 RX ADMIN — Medication 2: at 11:25

## 2022-06-03 RX ADMIN — LOSARTAN POTASSIUM 50 MILLIGRAM(S): 100 TABLET, FILM COATED ORAL at 05:58

## 2022-06-03 RX ADMIN — Medication 100 MILLIGRAM(S): at 06:05

## 2022-06-03 RX ADMIN — METFORMIN HYDROCHLORIDE 500 MILLIGRAM(S): 850 TABLET ORAL at 05:57

## 2022-06-03 RX ADMIN — METFORMIN HYDROCHLORIDE 500 MILLIGRAM(S): 850 TABLET ORAL at 16:46

## 2022-06-03 RX ADMIN — Medication 2: at 07:49

## 2022-06-03 RX ADMIN — GABAPENTIN 100 MILLIGRAM(S): 400 CAPSULE ORAL at 05:57

## 2022-06-03 RX ADMIN — SIMETHICONE 80 MILLIGRAM(S): 80 TABLET, CHEWABLE ORAL at 11:06

## 2022-06-03 RX ADMIN — MAGNESIUM OXIDE 400 MG ORAL TABLET 400 MILLIGRAM(S): 241.3 TABLET ORAL at 08:05

## 2022-06-03 RX ADMIN — RIVAROXABAN 20 MILLIGRAM(S): KIT at 16:47

## 2022-06-03 RX ADMIN — Medication 25 MILLIGRAM(S): at 05:57

## 2022-06-03 NOTE — PROGRESS NOTE ADULT - SUBJECTIVE AND OBJECTIVE BOX
SUBJECTIVE: Patient seen and examined bedside. Patient reports bowel function present - flatus and stool. Tolerating the diet, no more nausea. Pain is much improved.      hydrALAZINE 25 milliGRAM(s) Oral daily  losartan 50 milliGRAM(s) Oral daily  metoprolol succinate  milliGRAM(s) Oral daily  rivaroxaban 20 milliGRAM(s) Oral with dinner      Vital Signs Last 24 Hrs  T(C): 35.3 (03 Jun 2022 07:06), Max: 37.6 (02 Jun 2022 21:15)  T(F): 95.6 (03 Jun 2022 07:06), Max: 99.6 (02 Jun 2022 21:15)  HR: 70 (03 Jun 2022 07:06) (60 - 70)  BP: 165/70 (03 Jun 2022 07:06) (165/70 - 188/72)  BP(mean): --  RR: 18 (03 Jun 2022 07:06) (16 - 18)  SpO2: 98% (03 Jun 2022 07:06) (95% - 99%)  I&O's Detail      PHYSICAL EXAMINATION   General: NAD  NEURO:  follows commands.   PULM: nonlabored breathing, no respiratory distress  ABD: soft, nondistended, appropriately tender, no rebound, no guarding, no tympany. Incisions CDI and without hematoma or erythema    EXTREM: WWP, no edema, no calf tenderness  VASC: No cyanosis,  no pallor.   PSYCH: Appropriate affect, answers questions appropriately      LABS:                        9.2    3.76  )-----------( 218      ( 03 Jun 2022 06:06 )             29.1     06-03    140  |  108  |  10  ----------------------------<  202<H>  4.0   |  27  |  0.96    Ca    8.6      03 Jun 2022 06:06  Phos  3.2     06-03  Mg     1.9     06-03

## 2022-06-03 NOTE — PROGRESS NOTE ADULT - SUBJECTIVE AND OBJECTIVE BOX
Vital Signs Last 24 Hrs  T(C): 35.3 (03 Jun 2022 07:06), Max: 37.6 (02 Jun 2022 21:15)  T(F): 95.6 (03 Jun 2022 07:06), Max: 99.6 (02 Jun 2022 21:15)  HR: 70 (03 Jun 2022 07:06) (60 - 70)  BP: 165/70 (03 Jun 2022 07:06) (165/70 - 188/72)  BP(mean): --  RR: 18 (03 Jun 2022 07:06) (16 - 18)  SpO2: 98% (03 Jun 2022 07:06) (95% - 99%)    I&O's Detail                            9.2    3.76  )-----------( 218      ( 03 Jun 2022 06:06 )             29.1       06-03    140  |  108  |  10  ----------------------------<  202<H>  4.0   |  27  |  0.96    Ca    8.6      03 Jun 2022 06:06  Phos  3.2     06-03  Mg     1.9     06-03  tolerating diet            PLAN:  Discharge home today  Instructions given

## 2022-06-03 NOTE — DISCHARGE NOTE NURSING/CASE MANAGEMENT/SOCIAL WORK - PATIENT PORTAL LINK FT
You can access the FollowMyHealth Patient Portal offered by [facility] by registering at the following website: [email or web address]. By joining [facility]’s FollowMyHealth portal, you will also be able to view your health information using other applications (apps) compatible with our system.

## 2022-06-03 NOTE — DISCHARGE NOTE NURSING/CASE MANAGEMENT/SOCIAL WORK - NSDCPEFALRISK_GEN_ALL_CORE
For information on Fall & Injury Prevention, visit: [email or web address] OR  [email or web address] OR  https://www.cdc.gov/steadi/patient.html

## 2022-06-03 NOTE — PROGRESS NOTE ADULT - ASSESSMENT
76F with hepatic flexure colon mass, pending OR today  -OR today for robotic assisted right hemicolectomy
76F, s/p laparoscopic extended right hemicolectomy. Has Stage 3 colon cancer. pT3: Tumor invades through the muscularis propria into pericolorectal tissues. pN1b: Two or three regional lymph nodes are positive    Patient is HDS, AOx3. Good pain improvement. On regular diet, no nausea. bowel function present.    Plan:  Planning discharge home with Hematology and surgical oncology FU  Will undergo adjuvant chemotherapy with Heme/Onc  Enoxaparin for home  
colon ca -- s/p R hemicolectomy on 5/27  -- mgmt per surg  -- f/u final surg path  -- outpt f/u with dr Rollins after dc    h/o GERALDINE -- hgb adequate, monitor  -- also outpt f/u with Dr Rollins after d/c    leukopenia -- acute, suspect reactive, afebrile  -- monitor for now    D/w pt, will follow. 
· Assessment	  colon ca -- s/p R hemicolectomy on 5/27  -- mgmt per surg  -- f/u stage 3 colon ca, needs adjuvant chemo,  -- outpt f/u with dr Rollins after dc    h/o GERALDINE -- hgb adequate, monitor  -- also outpt f/u with Dr Rollins after d/c    leukopenia -- acute, suspect reactive, afebrile  -- monitor for now
76F with right colon mass, pending OR today for lap right colon resection  -OR today  -NPO  -trend H/H
76F, s/p laparoscopic extended right hemicolectomy.   Patient is HDS, AOx3. Good pain improvement. On clears. Reporting some nausea.  Pain improving  No GI function yet    Plan:  Nausea and pain control  Continue with Clears  OOB ambulating with PT
ggggg
76F, s/p laparoscopic extended right hemicolectomy.   Patient is HDS, AOx3. Good pain improvement. On clears. Reporting some nausea.  Pain improving  No GI function yet    Plan:  Nausea and pain control  Continue with Clears  OOB ambulating with PT

## 2022-06-03 NOTE — PROGRESS NOTE ADULT - SUBJECTIVE AND OBJECTIVE BOX
no appetite  still feel weak  no fever    MEDICATIONS  (STANDING):  acetaminophen   IVPB .. 1000 milliGRAM(s) IV Intermittent once  atorvastatin 40 milliGRAM(s) Oral at bedtime  dextrose 5% + sodium chloride 0.45% with potassium chloride 20 mEq/L 1000 milliLiter(s) (50 mL/Hr) IV Continuous <Continuous>  dextrose 5%. 1000 milliLiter(s) (100 mL/Hr) IV Continuous <Continuous>  dextrose 5%. 1000 milliLiter(s) (50 mL/Hr) IV Continuous <Continuous>  dextrose 50% Injectable 25 Gram(s) IV Push once  dextrose 50% Injectable 12.5 Gram(s) IV Push once  dextrose 50% Injectable 25 Gram(s) IV Push once  donepezil 10 milliGRAM(s) Oral at bedtime  ergocalciferol 09669 Unit(s) Oral every week  fenofibrate Tablet 145 milliGRAM(s) Oral daily  gabapentin 100 milliGRAM(s) Oral three times a day  glucagon  Injectable 1 milliGRAM(s) IntraMuscular once  hydrALAZINE 25 milliGRAM(s) Oral daily  insulin lispro (ADMELOG) corrective regimen sliding scale   SubCutaneous Before meals and at bedtime  levothyroxine 75 MICROGram(s) Oral daily  loratadine 10 milliGRAM(s) Oral daily  losartan 50 milliGRAM(s) Oral daily  metFORMIN 500 milliGRAM(s) Oral two times a day  metoprolol succinate  milliGRAM(s) Oral daily  multivitamin 1 Tablet(s) Oral daily  pantoprazole  Injectable 40 milliGRAM(s) IV Push every 24 hours  rivaroxaban 20 milliGRAM(s) Oral with dinner  simethicone 80 milliGRAM(s) Chew once    MEDICATIONS  (PRN):  dextrose Oral Gel 15 Gram(s) Oral once PRN Blood Glucose LESS THAN 70 milliGRAM(s)/deciliter  HYDROmorphone  Injectable 0.5 milliGRAM(s) IV Push every 4 hours PRN Severe Pain (7 - 10)  ondansetron Injectable 4 milliGRAM(s) IV Push every 8 hours PRN Nausea and/or Vomiting  simethicone 80 milliGRAM(s) Chew every 6 hours PRN Gas      Allergies    No Known Allergies    Intolerances        Vital Signs Last 24 Hrs  T(C): 35.3 (03 Jun 2022 07:06), Max: 37.6 (02 Jun 2022 21:15)  T(F): 95.6 (03 Jun 2022 07:06), Max: 99.6 (02 Jun 2022 21:15)  HR: 70 (03 Jun 2022 07:06) (60 - 70)  BP: 165/70 (03 Jun 2022 07:06) (165/70 - 188/72)  BP(mean): --  RR: 18 (03 Jun 2022 07:06) (16 - 18)  SpO2: 98% (03 Jun 2022 07:06) (95% - 99%)    PHYSICAL EXAM  General: adult in NAD  HEENT: clear oropharynx, anicteric sclera, pink conjunctiva  Neck: supple  CV: normal S1/S2 with no murmur rubs or gallops  Lungs: positive air movement b/l ant lungs,clear to auscultation, no wheezes, no rales  Abdomen: soft non-tender non-distended, no hepatosplenomegaly  Ext: no clubbing cyanosis or edema  Skin: no rashes and no petechiae  Neuro: alert and oriented X 4, no focal deficits  LABS:                          9.2    3.76  )-----------( 218      ( 03 Jun 2022 06:06 )             29.1         Mean Cell Volume : 93.3 fl  Mean Cell Hemoglobin : 29.5 pg  Mean Cell Hemoglobin Concentration : 31.6 gm/dL  Auto Neutrophil # : x  Auto Lymphocyte # : x  Auto Monocyte # : x  Auto Eosinophil # : x  Auto Basophil # : x  Auto Neutrophil % : x  Auto Lymphocyte % : x  Auto Monocyte % : x  Auto Eosinophil % : x  Auto Basophil % : x    Serial CBC  Hematocrit 29.1  Hemoglobin 9.2  Plat 218  RBC 3.12  WBC 3.76  Serial CBC  Hematocrit 29.1  Hemoglobin 9.3  Plat 217  RBC 3.14  WBC 3.65  Serial CBC  Hematocrit 29.0  Hemoglobin 9.3  Plat 196  RBC 3.07  WBC 3.93    06-03    140  |  108  |  10  ----------------------------<  202<H>  4.0   |  27  |  0.96    Ca    8.6      03 Jun 2022 06:06  Phos  3.2     06-03  Mg     1.9     06-03                      BLOOD SMEAR INTERPRETATION:       RADIOLOGY & ADDITIONAL STUDIES:

## 2022-06-03 NOTE — DISCHARGE NOTE NURSING/CASE MANAGEMENT/SOCIAL WORK - NSDCFUADDAPPT_GEN_ALL_CORE_FT
It is important that you have the follow up with Hematology to continue care and discuss the need for more treatment.

## 2022-06-17 PROBLEM — I10 HYPERTENSION: Status: ACTIVE | Noted: 2022-06-17

## 2022-06-17 PROBLEM — Z85.3 HISTORY OF MALIGNANT NEOPLASM OF BREAST: Status: RESOLVED | Noted: 2022-06-17 | Resolved: 2022-06-17

## 2022-06-17 PROBLEM — E78.5 HYPERLIPIDEMIA: Status: ACTIVE | Noted: 2022-06-17

## 2022-06-17 PROBLEM — E11.9 TYPE 2 DIABETES MELLITUS: Status: ACTIVE | Noted: 2022-06-17

## 2022-06-17 PROBLEM — E03.9 HYPOTHYROIDISM: Status: ACTIVE | Noted: 2022-06-17

## 2022-06-20 ENCOUNTER — INPATIENT (INPATIENT)
Facility: HOSPITAL | Age: 77
LOS: 3 days | Discharge: HOME | DRG: 378 | End: 2022-06-24
Attending: INTERNAL MEDICINE | Admitting: INTERNAL MEDICINE
Payer: MEDICARE

## 2022-06-20 VITALS
DIASTOLIC BLOOD PRESSURE: 54 MMHG | SYSTOLIC BLOOD PRESSURE: 90 MMHG | HEART RATE: 74 BPM | RESPIRATION RATE: 16 BRPM | OXYGEN SATURATION: 99 % | HEIGHT: 62 IN | TEMPERATURE: 98 F | WEIGHT: 154.1 LBS

## 2022-06-20 DIAGNOSIS — E11.9 TYPE 2 DIABETES MELLITUS WITHOUT COMPLICATIONS: ICD-10-CM

## 2022-06-20 DIAGNOSIS — Z95.0 PRESENCE OF CARDIAC PACEMAKER: Chronic | ICD-10-CM

## 2022-06-20 DIAGNOSIS — Z98.890 OTHER SPECIFIED POSTPROCEDURAL STATES: Chronic | ICD-10-CM

## 2022-06-20 DIAGNOSIS — E87.5 HYPERKALEMIA: ICD-10-CM

## 2022-06-20 DIAGNOSIS — K92.2 GASTROINTESTINAL HEMORRHAGE, UNSPECIFIED: ICD-10-CM

## 2022-06-20 DIAGNOSIS — Z90.12 ACQUIRED ABSENCE OF LEFT BREAST AND NIPPLE: Chronic | ICD-10-CM

## 2022-06-20 DIAGNOSIS — Z90.49 ACQUIRED ABSENCE OF OTHER SPECIFIED PARTS OF DIGESTIVE TRACT: Chronic | ICD-10-CM

## 2022-06-20 DIAGNOSIS — K92.1 MELENA: ICD-10-CM

## 2022-06-20 DIAGNOSIS — Z29.9 ENCOUNTER FOR PROPHYLACTIC MEASURES, UNSPECIFIED: ICD-10-CM

## 2022-06-20 DIAGNOSIS — Z95.1 PRESENCE OF AORTOCORONARY BYPASS GRAFT: Chronic | ICD-10-CM

## 2022-06-20 DIAGNOSIS — I10 ESSENTIAL (PRIMARY) HYPERTENSION: ICD-10-CM

## 2022-06-20 DIAGNOSIS — D64.9 ANEMIA, UNSPECIFIED: ICD-10-CM

## 2022-06-20 DIAGNOSIS — I48.20 CHRONIC ATRIAL FIBRILLATION, UNSPECIFIED: ICD-10-CM

## 2022-06-20 DIAGNOSIS — C18.9 MALIGNANT NEOPLASM OF COLON, UNSPECIFIED: ICD-10-CM

## 2022-06-20 LAB
ALBUMIN SERPL ELPH-MCNC: 3.1 G/DL — LOW (ref 3.5–5)
ALP SERPL-CCNC: 36 U/L — LOW (ref 40–120)
ALT FLD-CCNC: 20 U/L DA — SIGNIFICANT CHANGE UP (ref 10–60)
ANION GAP SERPL CALC-SCNC: 1 MMOL/L — LOW (ref 5–17)
ANION GAP SERPL CALC-SCNC: 3 MMOL/L — LOW (ref 5–17)
ANION GAP SERPL CALC-SCNC: 7 MMOL/L — SIGNIFICANT CHANGE UP (ref 5–17)
APTT BLD: 32.8 SEC — SIGNIFICANT CHANGE UP (ref 27.5–35.5)
AST SERPL-CCNC: 11 U/L — SIGNIFICANT CHANGE UP (ref 10–40)
BASOPHILS # BLD AUTO: 0.03 K/UL — SIGNIFICANT CHANGE UP (ref 0–0.2)
BASOPHILS # BLD AUTO: 0.04 K/UL — SIGNIFICANT CHANGE UP (ref 0–0.2)
BASOPHILS NFR BLD AUTO: 0.5 % — SIGNIFICANT CHANGE UP (ref 0–2)
BASOPHILS NFR BLD AUTO: 0.6 % — SIGNIFICANT CHANGE UP (ref 0–2)
BILIRUB SERPL-MCNC: 0.1 MG/DL — LOW (ref 0.2–1.2)
BUN SERPL-MCNC: 45 MG/DL — HIGH (ref 7–18)
BUN SERPL-MCNC: 46 MG/DL — HIGH (ref 7–18)
BUN SERPL-MCNC: 49 MG/DL — HIGH (ref 7–18)
CALCIUM SERPL-MCNC: 7.8 MG/DL — LOW (ref 8.4–10.5)
CALCIUM SERPL-MCNC: 8.3 MG/DL — LOW (ref 8.4–10.5)
CALCIUM SERPL-MCNC: 8.7 MG/DL — SIGNIFICANT CHANGE UP (ref 8.4–10.5)
CHLORIDE SERPL-SCNC: 101 MMOL/L — SIGNIFICANT CHANGE UP (ref 96–108)
CHLORIDE SERPL-SCNC: 106 MMOL/L — SIGNIFICANT CHANGE UP (ref 96–108)
CHLORIDE SERPL-SCNC: 107 MMOL/L — SIGNIFICANT CHANGE UP (ref 96–108)
CO2 SERPL-SCNC: 26 MMOL/L — SIGNIFICANT CHANGE UP (ref 22–31)
CO2 SERPL-SCNC: 28 MMOL/L — SIGNIFICANT CHANGE UP (ref 22–31)
CO2 SERPL-SCNC: 28 MMOL/L — SIGNIFICANT CHANGE UP (ref 22–31)
CREAT SERPL-MCNC: 1.14 MG/DL — SIGNIFICANT CHANGE UP (ref 0.5–1.3)
CREAT SERPL-MCNC: 1.2 MG/DL — SIGNIFICANT CHANGE UP (ref 0.5–1.3)
CREAT SERPL-MCNC: 1.25 MG/DL — SIGNIFICANT CHANGE UP (ref 0.5–1.3)
EGFR: 45 ML/MIN/1.73M2 — LOW
EGFR: 47 ML/MIN/1.73M2 — LOW
EGFR: 50 ML/MIN/1.73M2 — LOW
EOSINOPHIL # BLD AUTO: 0.01 K/UL — SIGNIFICANT CHANGE UP (ref 0–0.5)
EOSINOPHIL # BLD AUTO: 0.03 K/UL — SIGNIFICANT CHANGE UP (ref 0–0.5)
EOSINOPHIL NFR BLD AUTO: 0.1 % — SIGNIFICANT CHANGE UP (ref 0–6)
EOSINOPHIL NFR BLD AUTO: 0.5 % — SIGNIFICANT CHANGE UP (ref 0–6)
GLUCOSE SERPL-MCNC: 164 MG/DL — HIGH (ref 70–99)
GLUCOSE SERPL-MCNC: 218 MG/DL — HIGH (ref 70–99)
GLUCOSE SERPL-MCNC: 272 MG/DL — HIGH (ref 70–99)
HCT VFR BLD CALC: 20.7 % — CRITICAL LOW (ref 34.5–45)
HCT VFR BLD CALC: 27.2 % — LOW (ref 34.5–45)
HGB BLD-MCNC: 6.8 G/DL — CRITICAL LOW (ref 11.5–15.5)
HGB BLD-MCNC: 8.7 G/DL — LOW (ref 11.5–15.5)
IMM GRANULOCYTES NFR BLD AUTO: 0.6 % — SIGNIFICANT CHANGE UP (ref 0–1.5)
IMM GRANULOCYTES NFR BLD AUTO: 0.7 % — SIGNIFICANT CHANGE UP (ref 0–1.5)
INR BLD: 1.23 RATIO — HIGH (ref 0.88–1.16)
LIDOCAIN IGE QN: 351 U/L — SIGNIFICANT CHANGE UP (ref 73–393)
LYMPHOCYTES # BLD AUTO: 1 K/UL — SIGNIFICANT CHANGE UP (ref 1–3.3)
LYMPHOCYTES # BLD AUTO: 1.95 K/UL — SIGNIFICANT CHANGE UP (ref 1–3.3)
LYMPHOCYTES # BLD AUTO: 14.9 % — SIGNIFICANT CHANGE UP (ref 13–44)
LYMPHOCYTES # BLD AUTO: 33.7 % — SIGNIFICANT CHANGE UP (ref 13–44)
MCHC RBC-ENTMCNC: 30.6 PG — SIGNIFICANT CHANGE UP (ref 27–34)
MCHC RBC-ENTMCNC: 30.7 PG — SIGNIFICANT CHANGE UP (ref 27–34)
MCHC RBC-ENTMCNC: 32 GM/DL — SIGNIFICANT CHANGE UP (ref 32–36)
MCHC RBC-ENTMCNC: 32.9 GM/DL — SIGNIFICANT CHANGE UP (ref 32–36)
MCV RBC AUTO: 93.2 FL — SIGNIFICANT CHANGE UP (ref 80–100)
MCV RBC AUTO: 96.1 FL — SIGNIFICANT CHANGE UP (ref 80–100)
MONOCYTES # BLD AUTO: 0.43 K/UL — SIGNIFICANT CHANGE UP (ref 0–0.9)
MONOCYTES # BLD AUTO: 0.54 K/UL — SIGNIFICANT CHANGE UP (ref 0–0.9)
MONOCYTES NFR BLD AUTO: 6.4 % — SIGNIFICANT CHANGE UP (ref 2–14)
MONOCYTES NFR BLD AUTO: 9.3 % — SIGNIFICANT CHANGE UP (ref 2–14)
NEUTROPHILS # BLD AUTO: 3.2 K/UL — SIGNIFICANT CHANGE UP (ref 1.8–7.4)
NEUTROPHILS # BLD AUTO: 5.18 K/UL — SIGNIFICANT CHANGE UP (ref 1.8–7.4)
NEUTROPHILS NFR BLD AUTO: 55.3 % — SIGNIFICANT CHANGE UP (ref 43–77)
NEUTROPHILS NFR BLD AUTO: 77.4 % — HIGH (ref 43–77)
NRBC # BLD: 0 /100 WBCS — SIGNIFICANT CHANGE UP (ref 0–0)
NRBC # BLD: 0 /100 WBCS — SIGNIFICANT CHANGE UP (ref 0–0)
PLATELET # BLD AUTO: 213 K/UL — SIGNIFICANT CHANGE UP (ref 150–400)
PLATELET # BLD AUTO: 263 K/UL — SIGNIFICANT CHANGE UP (ref 150–400)
POTASSIUM SERPL-MCNC: 5 MMOL/L — SIGNIFICANT CHANGE UP (ref 3.5–5.3)
POTASSIUM SERPL-MCNC: 6.3 MMOL/L — CRITICAL HIGH (ref 3.5–5.3)
POTASSIUM SERPL-MCNC: 6.5 MMOL/L — CRITICAL HIGH (ref 3.5–5.3)
POTASSIUM SERPL-SCNC: 5 MMOL/L — SIGNIFICANT CHANGE UP (ref 3.5–5.3)
POTASSIUM SERPL-SCNC: 6.3 MMOL/L — CRITICAL HIGH (ref 3.5–5.3)
POTASSIUM SERPL-SCNC: 6.5 MMOL/L — CRITICAL HIGH (ref 3.5–5.3)
PROT SERPL-MCNC: 6.5 G/DL — SIGNIFICANT CHANGE UP (ref 6–8.3)
PROTHROM AB SERPL-ACNC: 14.7 SEC — HIGH (ref 10.5–13.4)
RBC # BLD: 2.22 M/UL — LOW (ref 3.8–5.2)
RBC # BLD: 2.83 M/UL — LOW (ref 3.8–5.2)
RBC # FLD: 17.2 % — HIGH (ref 10.3–14.5)
RBC # FLD: 17.3 % — HIGH (ref 10.3–14.5)
SARS-COV-2 RNA SPEC QL NAA+PROBE: SIGNIFICANT CHANGE UP
SODIUM SERPL-SCNC: 134 MMOL/L — LOW (ref 135–145)
SODIUM SERPL-SCNC: 136 MMOL/L — SIGNIFICANT CHANGE UP (ref 135–145)
SODIUM SERPL-SCNC: 137 MMOL/L — SIGNIFICANT CHANGE UP (ref 135–145)
WBC # BLD: 5.79 K/UL — SIGNIFICANT CHANGE UP (ref 3.8–10.5)
WBC # BLD: 6.7 K/UL — SIGNIFICANT CHANGE UP (ref 3.8–10.5)
WBC # FLD AUTO: 5.79 K/UL — SIGNIFICANT CHANGE UP (ref 3.8–10.5)
WBC # FLD AUTO: 6.7 K/UL — SIGNIFICANT CHANGE UP (ref 3.8–10.5)

## 2022-06-20 PROCEDURE — 99285 EMERGENCY DEPT VISIT HI MDM: CPT

## 2022-06-20 PROCEDURE — 74177 CT ABD & PELVIS W/CONTRAST: CPT | Mod: 26,MG

## 2022-06-20 PROCEDURE — 99223 1ST HOSP IP/OBS HIGH 75: CPT

## 2022-06-20 PROCEDURE — G1004: CPT

## 2022-06-20 PROCEDURE — 93010 ELECTROCARDIOGRAM REPORT: CPT

## 2022-06-20 RX ORDER — DONEPEZIL HYDROCHLORIDE 10 MG/1
1 TABLET, FILM COATED ORAL
Qty: 0 | Refills: 0 | DISCHARGE

## 2022-06-20 RX ORDER — SITAGLIPTIN 50 MG/1
0 TABLET, FILM COATED ORAL
Qty: 0 | Refills: 0 | DISCHARGE

## 2022-06-20 RX ORDER — LORATADINE 10 MG/1
1 TABLET ORAL
Qty: 0 | Refills: 0 | DISCHARGE

## 2022-06-20 RX ORDER — SODIUM ZIRCONIUM CYCLOSILICATE 10 G/10G
10 POWDER, FOR SUSPENSION ORAL ONCE
Refills: 0 | Status: COMPLETED | OUTPATIENT
Start: 2022-06-20 | End: 2022-06-20

## 2022-06-20 RX ORDER — ERGOCALCIFEROL 1.25 MG/1
1 CAPSULE ORAL
Qty: 0 | Refills: 0 | DISCHARGE

## 2022-06-20 RX ORDER — CALCIUM GLUCONATE 100 MG/ML
1 VIAL (ML) INTRAVENOUS ONCE
Refills: 0 | Status: COMPLETED | OUTPATIENT
Start: 2022-06-20 | End: 2022-06-20

## 2022-06-20 RX ORDER — INSULIN DETEMIR 100/ML (3)
35 INSULIN PEN (ML) SUBCUTANEOUS
Qty: 0 | Refills: 0 | DISCHARGE

## 2022-06-20 RX ORDER — LEVOTHYROXINE SODIUM 125 MCG
60 TABLET ORAL AT BEDTIME
Refills: 0 | Status: DISCONTINUED | OUTPATIENT
Start: 2022-06-20 | End: 2022-06-24

## 2022-06-20 RX ORDER — LOSARTAN POTASSIUM 100 MG/1
0 TABLET, FILM COATED ORAL
Qty: 0 | Refills: 0 | DISCHARGE

## 2022-06-20 RX ORDER — INSULIN LISPRO 100/ML
VIAL (ML) SUBCUTANEOUS EVERY 6 HOURS
Refills: 0 | Status: DISCONTINUED | OUTPATIENT
Start: 2022-06-20 | End: 2022-06-24

## 2022-06-20 RX ORDER — LEVOTHYROXINE SODIUM 125 MCG
0 TABLET ORAL
Qty: 0 | Refills: 0 | DISCHARGE

## 2022-06-20 RX ORDER — PANTOPRAZOLE SODIUM 20 MG/1
40 TABLET, DELAYED RELEASE ORAL
Refills: 0 | Status: DISCONTINUED | OUTPATIENT
Start: 2022-06-20 | End: 2022-06-24

## 2022-06-20 RX ORDER — ACETAMINOPHEN 500 MG
2 TABLET ORAL
Qty: 0 | Refills: 0 | DISCHARGE

## 2022-06-20 RX ORDER — FERROUS SULFATE 325(65) MG
1 TABLET ORAL
Qty: 0 | Refills: 0 | DISCHARGE

## 2022-06-20 RX ORDER — DEXTROSE 50 % IN WATER 50 %
50 SYRINGE (ML) INTRAVENOUS ONCE
Refills: 0 | Status: COMPLETED | OUTPATIENT
Start: 2022-06-20 | End: 2022-06-20

## 2022-06-20 RX ORDER — INSULIN HUMAN 100 [IU]/ML
10 INJECTION, SOLUTION SUBCUTANEOUS ONCE
Refills: 0 | Status: COMPLETED | OUTPATIENT
Start: 2022-06-20 | End: 2022-06-20

## 2022-06-20 RX ORDER — METOPROLOL TARTRATE 50 MG
0 TABLET ORAL
Qty: 0 | Refills: 0 | DISCHARGE

## 2022-06-20 RX ORDER — ALENDRONATE SODIUM 70 MG/1
0 TABLET ORAL
Qty: 0 | Refills: 0 | DISCHARGE

## 2022-06-20 RX ORDER — SODIUM CHLORIDE 9 MG/ML
1000 INJECTION INTRAMUSCULAR; INTRAVENOUS; SUBCUTANEOUS ONCE
Refills: 0 | Status: COMPLETED | OUTPATIENT
Start: 2022-06-20 | End: 2022-06-20

## 2022-06-20 RX ORDER — GABAPENTIN 400 MG/1
0 CAPSULE ORAL
Qty: 0 | Refills: 0 | DISCHARGE

## 2022-06-20 RX ORDER — HYDRALAZINE HCL 50 MG
0 TABLET ORAL
Qty: 0 | Refills: 0 | DISCHARGE

## 2022-06-20 RX ORDER — ONDANSETRON 8 MG/1
4 TABLET, FILM COATED ORAL ONCE
Refills: 0 | Status: COMPLETED | OUTPATIENT
Start: 2022-06-20 | End: 2022-06-20

## 2022-06-20 RX ORDER — DOCUSATE SODIUM 100 MG
1 CAPSULE ORAL
Qty: 0 | Refills: 0 | DISCHARGE

## 2022-06-20 RX ORDER — MORPHINE SULFATE 50 MG/1
4 CAPSULE, EXTENDED RELEASE ORAL ONCE
Refills: 0 | Status: DISCONTINUED | OUTPATIENT
Start: 2022-06-20 | End: 2022-06-20

## 2022-06-20 RX ADMIN — MORPHINE SULFATE 4 MILLIGRAM(S): 50 CAPSULE, EXTENDED RELEASE ORAL at 14:53

## 2022-06-20 RX ADMIN — MORPHINE SULFATE 4 MILLIGRAM(S): 50 CAPSULE, EXTENDED RELEASE ORAL at 14:23

## 2022-06-20 RX ADMIN — Medication 50 MILLILITER(S): at 16:51

## 2022-06-20 RX ADMIN — SODIUM CHLORIDE 1000 MILLILITER(S): 9 INJECTION INTRAMUSCULAR; INTRAVENOUS; SUBCUTANEOUS at 14:23

## 2022-06-20 RX ADMIN — INSULIN HUMAN 10 UNIT(S): 100 INJECTION, SOLUTION SUBCUTANEOUS at 16:52

## 2022-06-20 RX ADMIN — SODIUM ZIRCONIUM CYCLOSILICATE 10 GRAM(S): 10 POWDER, FOR SUSPENSION ORAL at 16:53

## 2022-06-20 RX ADMIN — Medication 1 GRAM(S): at 17:20

## 2022-06-20 RX ADMIN — Medication 100 GRAM(S): at 16:51

## 2022-06-20 RX ADMIN — ONDANSETRON 4 MILLIGRAM(S): 8 TABLET, FILM COATED ORAL at 14:23

## 2022-06-20 RX ADMIN — SODIUM CHLORIDE 1000 MILLILITER(S): 9 INJECTION INTRAMUSCULAR; INTRAVENOUS; SUBCUTANEOUS at 15:30

## 2022-06-20 NOTE — CONSULT NOTE ADULT - ASSESSMENT
Abd pain  GIB  Colon Ca s/p lap extended right hemicolectemy 14 days ago  near syncope  anemia     Abd pain  GIB  Colon Ca s/p lap extended right hemicolectemy 14 days ago  near syncope  anemia  CT findings with expected post surgical changes  hyperkalemia, recent ekg changes reported.    Pt on cardiac monitor, troponins pending  No surgical intervention indicated presently  [facility] admission, GI consult however anemia is mild and not significantly lower than at time of prior discharge  Surgery will follow this patient in patient as consultation   trend labs  npo for now.     Abd pain  GIB  Colon Ca s/p lap extended right hemicolectemy 14 days ago  near syncope  anemia  CT findings with expected post surgical changes  hyperkalemia, recent ekg changes reported.    Pt on cardiac monitor, troponins pending  No surgical intervention indicated presently  [facility] admission, GI consult however anemia is mild and not significantly lower than at time of prior discharge  Surgery will follow this patient in patient as consultation   trend labs  npo for now.  IVF to start in ER

## 2022-06-20 NOTE — H&P ADULT - ASSESSMENT
Pt is a 75 yo F from home with PMH of Colon CA (s/p right hemicolectomy on May 27,2022), Afib on Xarelto with micra transcatheter pacemaker implanted 7/17/2017, CAD (s/p CABG in 2017) , Dementia, HTN, HLD, osteoporosis, T2DM, OA and PSH of left breast total mastectomy presents to the ED with lidia from this morning. Admitted for GI bleeding, anemia requiring blood transfusion and hyperkalemia.

## 2022-06-20 NOTE — H&P ADULT - PROBLEM SELECTOR PLAN 4
On admission pt had elevated potassium of 6.5  Was given cocktail and came down to 6.3 >> 5  monitor BMP

## 2022-06-20 NOTE — ED ADULT NURSE NOTE - NSIMPLEMENTINTERV_GEN_ALL_ED
Implemented All Fall Risk Interventions:  [place] to call system. Call bell, personal items and telephone within reach. Instruct patient to call for assistance. Room bathroom lighting operational. Non-slip footwear when patient is off stretcher. Physically safe environment: no spills, clutter or unnecessary equipment. Stretcher in lowest position, wheels locked, appropriate side rails in place. Provide visual cue, wrist band, yellow gown, etc. Monitor gait and stability. Monitor for mental status changes and reorient to person, place, and time. Review medications for side effects contributing to fall risk. Reinforce activity limits and safety measures with patient and family.

## 2022-06-20 NOTE — H&P ADULT - NSHPPHYSICALEXAM_GEN_ALL_CORE
Vital Signs Last 24 Hrs  T(C): 37.1 (20 Jun 2022 22:07), Max: 37.1 (20 Jun 2022 22:07)  T(F): 98.7 (20 Jun 2022 22:07), Max: 98.7 (20 Jun 2022 22:07)  HR: 76 (20 Jun 2022 22:07) (66 - 76)  BP: 113/57 (20 Jun 2022 22:07) (90/54 - 113/57)  RR: 18 (20 Jun 2022 22:07) (16 - 18)  SpO2: 98% (20 Jun 2022 22:07) (98% - 100%)    GENERAL: NAD, lying in bed comfortably  HEAD:  Atraumatic, Normocephalic  EYES: EOMI, PERRLA, conjunctiva and sclera clear  ENT: Moist mucous membranes  NECK: Supple, No JVD  CHEST/LUNG: Clear to auscultation bilaterally; No rales, rhonchi, wheezing, or rubs. Unlabored respirations  HEART: Regular rate and rhythm; No murmurs, rubs, or gallops  ABDOMEN: Bowel sounds present; Soft, Nontender, Nondistended. No hepatomegaly  EXTREMITIES:  2+ Peripheral Pulses, brisk capillary refill. No clubbing, cyanosis, or edema  NERVOUS SYSTEM:  Alert & Oriented X3, speech clear. No deficits   MSK: FROM all 4 extremities, full and equal strength  SKIN: No rashes or lesions

## 2022-06-20 NOTE — ED PROVIDER NOTE - NSICDXPASTSURGICALHX_GEN_ALL_CORE_FT
PAST SURGICAL HISTORY:  Cardiac pacemaker 7/17/2017    History of left mastectomy     S/P CABG x 1     S/P cardiac catheterization 5/10/2022    S/P right hemicolectomy

## 2022-06-20 NOTE — H&P ADULT - PROBLEM SELECTOR PLAN 2
Pt has h/o iron deficiency anemia  hemoglobin on admission 6.8  transfusing 2U pRBCs  monitor CBC post transfusion

## 2022-06-20 NOTE — ED PROVIDER NOTE - CLINICAL SUMMARY MEDICAL DECISION MAKING FREE TEXT BOX
Pt is a 75 yo F from home with PMH of Colon CA (s/p right hemicolectomy on May 27,2022), Afib (on Xarelto with micra transcatheter pacemaker implanted 7/17/2017), CAD (s/p CABG in 2017) , Dementia, HTN, HLD, osteoporosis, T2DM, OA and PSH of left breast total mastectomy presents to the ED with lidia from this morning. Pt is a poor historian. Was admitted to [facility] on May 27th for right hemicolectomy for the diagnosis of colon CA. Pt well appearing, NAD, no active bleeding at this time, abd benign, HD stable. Labs and reassess. Likely admit.

## 2022-06-20 NOTE — ED PROVIDER NOTE - PHYSICAL EXAMINATION
Gen: non-toxic  Head: NCAT  HEENT: EOMI, oral mucosa moist, normal conjunctiva  Lung: CTAB, no respiratory distress, no wheezes/rhonchi/rales B/L, speaking in full sentences  CV: RRR, no murmurs, rubs or gallops  Abd: soft, NTND, no guarding, no CVA tenderness  MSK: no visible deformities  Neuro: No focal sensory or motor deficits  Skin: Warm, well perfused, no rash  Psych: normal affect.     Dayday Negro, DO

## 2022-06-20 NOTE — H&P ADULT - HISTORY OF PRESENT ILLNESS
Pt is a 75 yo F from home with PMH of Colon CA (s/p right hemicolectomy on May 27,2022), Afib on Xarelto with micra transcatheter pacemaker implanted 7/17/2017, CAD (s/p CABG in 2017) , Dementia, HTN, HLD, osteoporosis, T2DM, OA and PSH of left breast total mastectomy presents to the ED with lidia from this morning.  Pt is a 75 yo F from home with PMH of Colon CA (s/p right hemicolectomy on May 27,2022), Afib (on Xarelto with micra transcatheter pacemaker implanted 7/17/2017), CAD (s/p CABG in 2017) , Dementia, HTN, HLD, osteoporosis, T2DM, OA and PSH of left breast total mastectomy presents to the ED with lidia from this morning. Pt is a poor historian due to poor cognition. According to daughter, she had two episodes of black stools today. She also was complaining of abdominal pain. No c/o chest pain, shortness of breath, fever, headache, N/V, urinary complaints.  She was admitted to [facility] on May 27th for right hemicolectomy for the diagnosis of colon CA.

## 2022-06-20 NOTE — H&P ADULT - PROBLEM SELECTOR PLAN 3
Pt has h/o colon CA  had a right hemicolectomy at [facility] on 5/27  Surgery team consulted  no acute surgical interventions needed  Heme/Oncology Dr. Liu consulted

## 2022-06-20 NOTE — ED PROVIDER NOTE - NSICDXPASTMEDICALHX_GEN_ALL_CORE_FT
PAST MEDICAL HISTORY:  Anemia     Atrial fibrillation and flutter     Breast cancer left breast    CAD (coronary artery disease)     Cataract     Colon cancer     DM (diabetes mellitus)     Hypertension     Hypothyroidism     Mixed hyperlipidemia     Osteopenia     Osteoporosis

## 2022-06-20 NOTE — H&P ADULT - PROBLEM SELECTOR PLAN 1
Pt presented with two epsiodes of lidia  Vitals stable  Physical exam mild tenderness over right abdomen  had a right hemicolectomy at [facility] on 5/27  Surgery team consulted  no acute surgical interventions needed  hemoglobin on admission 6.8  transfusing 2U pRBCs  monitor CBC post transfusion  Gi Dr. Tapia consulted Pt presented with two epsiodes of lidia  Vitals stable  Physical exam mild tenderness over right abdomen  had a right hemicolectomy at [facility] on 5/27  Surgery team consulted  no acute surgical interventions needed  hemoglobin on admission 6.8  transfusing 2U pRBCs  monitor CBC post transfusion  NPO now  started on IV protonix BID  Gi Dr. Tapia consulted

## 2022-06-20 NOTE — H&P ADULT - ATTENDING COMMENTS
75 yo F from home with PMH of Colon CA (s/p right hemicolectomy on May 27,2022), Afib on Xarelto with micra transcatheter pacemaker implanted 7/17/2017, CAD (s/p CABG in 2017) , Dementia, HTN, HLD, osteoporosis, T2DM, OA and PSH of left breast total mastectomy presents to the ED with melena from this morning.  Pt states that she has had on and off abdominal pain since discharge, however today she felt weak and was told by her caretaker that there was blood in her stool. There is report of near syncopal episode this am. No hemtemesis. No n/v. No fever. Last bm this am. Not yet started chemo.        assessment  --  symptomatic anemia, gi bleed, h/o Colon CA (s/p right hemicolectomy on May 27,2022), Afib on Xarelto with micra transcatheter pacemaker implanted 7/17/2017, CAD (s/p CABG in 2017) , Dementia, HTN, HLD, osteoporosis, T2DM, OA and PSH of left breast total mastectomy    plan  --  admit to med, transfuse 2 units prbc, protonix, hold xarelto, cont preadmit home meds, gi and dvt prophylaxis  cbc, bmp, mg, phos, lipids, tsh, bld cx, ua, ucx,      surg f/u   gi cons  card cons   heme onc cons

## 2022-06-20 NOTE — ED PROVIDER NOTE - OBJECTIVE STATEMENT
Pt is a 77 yo F from home with PMH of Colon CA (s/p right hemicolectomy on May 27,2022), Afib (on Xarelto with micra transcatheter pacemaker implanted 7/17/2017), CAD (s/p CABG in 2017) , Dementia, HTN, HLD, osteoporosis, T2DM, OA and PSH of left breast total mastectomy presents to the ED with lidia from this morning. Pt is a poor historian. Was admitted to [facility] on May 27th for right hemicolectomy for the diagnosis of colon CA.

## 2022-06-21 DIAGNOSIS — E03.9 HYPOTHYROIDISM, UNSPECIFIED: ICD-10-CM

## 2022-06-21 LAB
A1C WITH ESTIMATED AVERAGE GLUCOSE RESULT: 6.6 % — HIGH (ref 4–5.6)
ALBUMIN SERPL ELPH-MCNC: 3.2 G/DL — LOW (ref 3.5–5)
ALP SERPL-CCNC: 33 U/L — LOW (ref 40–120)
ALT FLD-CCNC: 15 U/L DA — SIGNIFICANT CHANGE UP (ref 10–60)
ANION GAP SERPL CALC-SCNC: 6 MMOL/L — SIGNIFICANT CHANGE UP (ref 5–17)
AST SERPL-CCNC: 6 U/L — LOW (ref 10–40)
BASOPHILS # BLD AUTO: 0.04 K/UL — SIGNIFICANT CHANGE UP (ref 0–0.2)
BASOPHILS NFR BLD AUTO: 0.6 % — SIGNIFICANT CHANGE UP (ref 0–2)
BILIRUB SERPL-MCNC: 0.2 MG/DL — SIGNIFICANT CHANGE UP (ref 0.2–1.2)
BUN SERPL-MCNC: 52 MG/DL — HIGH (ref 7–18)
CALCIUM SERPL-MCNC: 8.5 MG/DL — SIGNIFICANT CHANGE UP (ref 8.4–10.5)
CHLORIDE SERPL-SCNC: 105 MMOL/L — SIGNIFICANT CHANGE UP (ref 96–108)
CO2 SERPL-SCNC: 28 MMOL/L — SIGNIFICANT CHANGE UP (ref 22–31)
CREAT SERPL-MCNC: 1.31 MG/DL — HIGH (ref 0.5–1.3)
EGFR: 42 ML/MIN/1.73M2 — LOW
EOSINOPHIL # BLD AUTO: 0.08 K/UL — SIGNIFICANT CHANGE UP (ref 0–0.5)
EOSINOPHIL NFR BLD AUTO: 1.3 % — SIGNIFICANT CHANGE UP (ref 0–6)
ESTIMATED AVERAGE GLUCOSE: 143 MG/DL — HIGH (ref 68–114)
GLUCOSE BLDC GLUCOMTR-MCNC: 172 MG/DL — HIGH (ref 70–99)
GLUCOSE BLDC GLUCOMTR-MCNC: 179 MG/DL — HIGH (ref 70–99)
GLUCOSE BLDC GLUCOMTR-MCNC: 220 MG/DL — HIGH (ref 70–99)
GLUCOSE SERPL-MCNC: 208 MG/DL — HIGH (ref 70–99)
HCT VFR BLD CALC: 23.8 % — LOW (ref 34.5–45)
HCT VFR BLD CALC: 24.5 % — LOW (ref 34.5–45)
HCT VFR BLD CALC: 25.3 % — LOW (ref 34.5–45)
HGB BLD-MCNC: 7.6 G/DL — LOW (ref 11.5–15.5)
HGB BLD-MCNC: 8.1 G/DL — LOW (ref 11.5–15.5)
HGB BLD-MCNC: 8.6 G/DL — LOW (ref 11.5–15.5)
IMM GRANULOCYTES NFR BLD AUTO: 0.5 % — SIGNIFICANT CHANGE UP (ref 0–1.5)
LYMPHOCYTES # BLD AUTO: 1.82 K/UL — SIGNIFICANT CHANGE UP (ref 1–3.3)
LYMPHOCYTES # BLD AUTO: 28.6 % — SIGNIFICANT CHANGE UP (ref 13–44)
MAGNESIUM SERPL-MCNC: 2.1 MG/DL — SIGNIFICANT CHANGE UP (ref 1.6–2.6)
MCHC RBC-ENTMCNC: 30 PG — SIGNIFICANT CHANGE UP (ref 27–34)
MCHC RBC-ENTMCNC: 30.2 PG — SIGNIFICANT CHANGE UP (ref 27–34)
MCHC RBC-ENTMCNC: 30.7 PG — SIGNIFICANT CHANGE UP (ref 27–34)
MCHC RBC-ENTMCNC: 31.9 GM/DL — LOW (ref 32–36)
MCHC RBC-ENTMCNC: 33.1 GM/DL — SIGNIFICANT CHANGE UP (ref 32–36)
MCHC RBC-ENTMCNC: 34 GM/DL — SIGNIFICANT CHANGE UP (ref 32–36)
MCV RBC AUTO: 90.4 FL — SIGNIFICANT CHANGE UP (ref 80–100)
MCV RBC AUTO: 90.7 FL — SIGNIFICANT CHANGE UP (ref 80–100)
MCV RBC AUTO: 94.4 FL — SIGNIFICANT CHANGE UP (ref 80–100)
MONOCYTES # BLD AUTO: 0.54 K/UL — SIGNIFICANT CHANGE UP (ref 0–0.9)
MONOCYTES NFR BLD AUTO: 8.5 % — SIGNIFICANT CHANGE UP (ref 2–14)
NEUTROPHILS # BLD AUTO: 3.86 K/UL — SIGNIFICANT CHANGE UP (ref 1.8–7.4)
NEUTROPHILS NFR BLD AUTO: 60.5 % — SIGNIFICANT CHANGE UP (ref 43–77)
NRBC # BLD: 0 /100 WBCS — SIGNIFICANT CHANGE UP (ref 0–0)
PHOSPHATE SERPL-MCNC: 3.7 MG/DL — SIGNIFICANT CHANGE UP (ref 2.5–4.5)
PLATELET # BLD AUTO: 162 K/UL — SIGNIFICANT CHANGE UP (ref 150–400)
PLATELET # BLD AUTO: 174 K/UL — SIGNIFICANT CHANGE UP (ref 150–400)
PLATELET # BLD AUTO: 197 K/UL — SIGNIFICANT CHANGE UP (ref 150–400)
POTASSIUM SERPL-MCNC: 5.4 MMOL/L — HIGH (ref 3.5–5.3)
POTASSIUM SERPL-SCNC: 5.4 MMOL/L — HIGH (ref 3.5–5.3)
PROT SERPL-MCNC: 5.9 G/DL — LOW (ref 6–8.3)
RBC # BLD: 2.52 M/UL — LOW (ref 3.8–5.2)
RBC # BLD: 2.7 M/UL — LOW (ref 3.8–5.2)
RBC # BLD: 2.8 M/UL — LOW (ref 3.8–5.2)
RBC # FLD: 16.4 % — HIGH (ref 10.3–14.5)
RBC # FLD: 17.1 % — HIGH (ref 10.3–14.5)
RBC # FLD: 17.8 % — HIGH (ref 10.3–14.5)
SODIUM SERPL-SCNC: 139 MMOL/L — SIGNIFICANT CHANGE UP (ref 135–145)
WBC # BLD: 5.75 K/UL — SIGNIFICANT CHANGE UP (ref 3.8–10.5)
WBC # BLD: 6.18 K/UL — SIGNIFICANT CHANGE UP (ref 3.8–10.5)
WBC # BLD: 6.37 K/UL — SIGNIFICANT CHANGE UP (ref 3.8–10.5)
WBC # FLD AUTO: 5.75 K/UL — SIGNIFICANT CHANGE UP (ref 3.8–10.5)
WBC # FLD AUTO: 6.18 K/UL — SIGNIFICANT CHANGE UP (ref 3.8–10.5)
WBC # FLD AUTO: 6.37 K/UL — SIGNIFICANT CHANGE UP (ref 3.8–10.5)

## 2022-06-21 PROCEDURE — 99232 SBSQ HOSP IP/OBS MODERATE 35: CPT

## 2022-06-21 RX ORDER — FUROSEMIDE 40 MG
20 TABLET ORAL ONCE
Refills: 0 | Status: COMPLETED | OUTPATIENT
Start: 2022-06-21 | End: 2022-06-21

## 2022-06-21 RX ORDER — SODIUM CHLORIDE 9 MG/ML
1000 INJECTION INTRAMUSCULAR; INTRAVENOUS; SUBCUTANEOUS
Refills: 0 | Status: COMPLETED | OUTPATIENT
Start: 2022-06-21 | End: 2022-06-21

## 2022-06-21 RX ADMIN — Medication 2: at 06:12

## 2022-06-21 RX ADMIN — Medication 20 MILLIGRAM(S): at 04:01

## 2022-06-21 RX ADMIN — PANTOPRAZOLE SODIUM 40 MILLIGRAM(S): 20 TABLET, DELAYED RELEASE ORAL at 17:20

## 2022-06-21 RX ADMIN — PANTOPRAZOLE SODIUM 40 MILLIGRAM(S): 20 TABLET, DELAYED RELEASE ORAL at 06:07

## 2022-06-21 RX ADMIN — Medication 60 MICROGRAM(S): at 22:14

## 2022-06-21 RX ADMIN — SODIUM CHLORIDE 150 MILLILITER(S): 9 INJECTION INTRAMUSCULAR; INTRAVENOUS; SUBCUTANEOUS at 10:59

## 2022-06-21 RX ADMIN — Medication 1: at 17:21

## 2022-06-21 RX ADMIN — Medication 60 MICROGRAM(S): at 05:08

## 2022-06-21 NOTE — PATIENT PROFILE ADULT - FALL HARM RISK - HARM RISK INTERVENTIONS
Assistance with ambulation/Assistance OOB with selected safe patient handling equipment/Communicate Risk of Fall with Harm to all staff/Monitor gait and stability/Reinforce activity limits and safety measures with patient and family/Sit up slowly, dangle for a short time, stand at bedside before walking/Tailored Fall Risk Interventions/Visual Cue: Yellow wristband and red socks/Bed in lowest position, wheels locked, appropriate side rails in place/Call bell, personal items and telephone in reach/Instruct patient to call for assistance before getting out of bed or chair/Non-slip footwear when patient is out of bed/[place] to call system/Physically safe environment - no spills, clutter or unnecessary equipment/Purposeful Proactive Rounding/Room/bathroom lighting operational, light cord in reach

## 2022-06-21 NOTE — PATIENT PROFILE ADULT - FALL HARM RISK - CONCLUSION
Chief Complaint   Patient presents with   • Eye Problem     would like referral for eye- bump on eye for years   • Foot     RT-bunion at pinky toe, plantars wart on LT   • Weight Problem       Patient is a 38 year old female who presents for \"a referral for my eye.\"  She has a soft \"bump\" under the left eye that has been present for years.  PCP placed ophthalmology referral two years ago but patient was too scared to see eye doctor due to fear of needles.  Vision not affected.  Not painful.  It bothers her and she would like to know what it is.  She has enlarged over the past several years.  Friends and coworkers are concerned and have asked patient if she is safe at home because it does swell in the mornings and can look like she has been punched in the eye.  She feels something is \"dislodged\" in her eye like there is \"air under there.\"    She would also like to discuss her feet.  She has pain over the \"pinky toe joint\" like a \"Gregoria's bunion.\"  She has been using toe separators.  Ongoing for two months and stable.  She changed shoes and is trying not wear narrow shoes.  She thinks this does help as her pain worsens with narrow shoes.      Patient wants advise on whether keto diet is healthy. She was previously successful on it but PCP told her she was losing too much weight.      Patient Active Problem List   Diagnosis   • Generalized anxiety disorder   • Arthralgia of left temporomandibular joint       Current Medications    CYCLOBENZAPRINE (FLEXERIL) 10 MG TABLET    Take 10 mg by mouth as needed for Other. PRN for jaw pain        ALLERGIES:   Allergen Reactions   • Chloraprep One Step RASH   • Dermabond RASH     Social History     Tobacco Use   Smoking Status Former Smoker   • Types: Cigarettes   • Last attempt to quit:    • Years since quittin.7   Smokeless Tobacco Never Used       Review of Systems   Eyes: Positive for photophobia. Negative for blurred vision, double vision, pain, discharge and  redness.       Visit Vitals  /80 (BP Location: LUE - Left upper extremity, Patient Position: Sitting, Cuff Size: Regular)   Pulse 63   Temp 98.2 °F (36.8 °C)   Ht 5' 1.5\" (1.562 m)   Wt 99.5 kg   LMP 12/06/2018 (Approximate)   SpO2 99%   BMI 40.78 kg/m²       Physical Exam   Constitutional: She is well-developed, well-nourished, and in no distress.   HENT:   Head: Normocephalic and atraumatic.   Eyes: Pupils are equal, round, and reactive to light. Conjunctivae, EOM and lids are normal. Right eye exhibits no discharge, no exudate and no hordeolum. Left eye exhibits no discharge, no exudate and no hordeolum. No scleral icterus.       Pulmonary/Chest: Effort normal.   Psychiatric: Mood and affect normal.   Vitals reviewed.       Assessment/Plan  Localized soft tissue swelling  -     SERVICE TO OPHTHALMOLOGY  - Uncertain whether eye or derm referral is more appropriate.  - Since swelling fluctuates with eye movement, I am asking ophtho to assess.    Bernardo's bunion of right foot  -     XR FOOT 3+ VW RIGHT; Future  -     SERVICE TO PODIATRY  - XR shows no abnormality.   - Will ask podiatry for recommendations for likely Tailor's bunion.    Obesity, morbid, BMI 40.0-49.9 (CMS/HCC)  -     SERVICE TO NUTRITION COUNSELING  - Patient to ensure she is getting macro and micronutrients in her diet.  - Will have dietician  patient regarding dietary intake.    Refuses influenza vaccine   Counseled on indications and adverse effects of vaccine. Patient aware of risks and declines.     Fall with Harm Risk

## 2022-06-21 NOTE — ED ADULT NURSE REASSESSMENT NOTE - NS ED NURSE REASSESS COMMENT FT1
Pt complain of pain while blood transfusion. Referred to Dr. Bond with order to discontinue transfusion. Pt in stable condition. Not in any form of distress Pt complain of pain while blood transfusion. Referred to DELILAH Serrano with order to discontinue transfusion. Pt in stable condition. Not in any form of distress

## 2022-06-21 NOTE — PROGRESS NOTE ADULT - ASSESSMENT
Pt is a 75 yo F from home with PMH of Colon CA (s/p right hemicolectomy on May 27,2022), Afib (on Xarelto with micra transcatheter pacemaker implanted 7/17/2017), CAD (s/p CABG in 2017) , Dementia, HTN, HLD, osteoporosis, T2DM, OA and PSH of left breast total mastectomy presents to the ED with melena from this morning. Pt is a poor historian due to poor cognition. According to daughter, she had two episodes of black stools before admission. She was admitted to [facility] on May 27th for right hemicolectomy for the diagnosis of colon CA.   Patient admitted to medicine for GIB, was transfused 1U PRBC with improvement in H&H. Surgery, GI and Hem/onc consulted. As per surgery recc patient will receive another unit of blood and GI consulted for possible EGD. Colonoscopy not advisable as she undergo recent hemicolectomy. Hem/onc dr Sandhu evaluated pt for possible outpatient visit to discuss adjuvant chemotherapy candidacy.

## 2022-06-21 NOTE — CONSULT NOTE ADULT - ASSESSMENT
MALISSA CHAPARRO is a 76y Female who presents with a chief complaint of GI bleed    Anemia  - Patient with severe anemia on presentation and reports of melena.  - Hold anticoagulation.  - Surgery and gastroenterology consulted. Follow-up on recommendations. Noted possible EGD.   - Monitor CBC and transfuse.  - Will follow-up on iron panel as outpatient.   - Noted CT abdomen/pelvis findings.     Colon Cancer  - Patient with recently resected T3N1 colon cancer in late May 2022.  - Will need to follow-up outpatient to discuss adjuvant chemotherapy candidacy.     On discharge, patient to follow-up with Dr. Mook Rollins.    Will continue to follow.    Terry Sandhu MD  Hematology/Oncology  O: [phone]

## 2022-06-21 NOTE — CONSULT NOTE ADULT - ASSESSMENT
1. Anemia  2. Melena  3. R/o stress induced gastric ulcer  4. R/o peptic ulcer disease  5. Colonic bleed less likely    Suggestions:    1. Monitor H/H  2. Transfuse PRBC as needed  3. Protonix IV  4. Avoid NSAID  5. EGD  6. Surgical evaluation  7. DVT prophylaxis

## 2022-06-21 NOTE — PROGRESS NOTE ADULT - SUBJECTIVE AND OBJECTIVE BOX
Vital Signs Last 24 Hrs  T(C): 37.2 (21 Jun 2022 07:26), Max: 37.2 (21 Jun 2022 07:26)  T(F): 99 (21 Jun 2022 07:26), Max: 99 (21 Jun 2022 07:26)  HR: 77 (21 Jun 2022 07:26) (66 - 78)  BP: 122/65 (21 Jun 2022 07:26) (90/54 - 129/67)  BP(mean): --  RR: 18 (21 Jun 2022 07:26) (16 - 18)  SpO2: 98% (21 Jun 2022 07:26) (96% - 100%)    I&O's Detail                            8.1    6.37  )-----------( 197      ( 21 Jun 2022 05:31 )             24.5       06-21    139  |  105  |  52<H>  ----------------------------<  208<H>  5.4<H>   |  28  |  1.31<H>    Ca    8.5      21 Jun 2022 05:31  Phos  3.7     06-21  Mg     2.1     06-21    TPro  5.9<L>  /  Alb  3.2<L>  /  TBili  0.2  /  DBili  x   /  AST  6<L>  /  ALT  15  /  AlkPhos  33<L>  06-21      PT/INR - ( 20 Jun 2022 14:24 )   PT: 14.7 sec;   INR: 1.23 ratio         PTT - ( 20 Jun 2022 14:24 )  PTT:32.8 sec    patient having red blood per rectum this AM    she is hemodynamically stable    PLAN:  Transfuse another unit of PRBC's this AM  GI evaluation for possible EGD  Hold anticoagulation for now  If bleeding continues and EGD is negative, a CT angiogram may be necessary

## 2022-06-21 NOTE — PROGRESS NOTE ADULT - SUBJECTIVE AND OBJECTIVE BOX
NP Note discussed with Primary Attending.    Patient is a 76y old  Female who presents with a chief complaint of GI bleed (21 Jun 2022 10:10)      INTERVAL HPI/OVERNIGHT EVENTS: no new complaints    MEDICATIONS  (STANDING):  insulin lispro (ADMELOG) corrective regimen sliding scale   SubCutaneous every 6 hours  levothyroxine Injectable 60 MICROGram(s) IV Push at bedtime  pantoprazole  Injectable 40 milliGRAM(s) IV Push two times a day    MEDICATIONS  (PRN):      __________________________________________________  REVIEW OF SYSTEMS:    CONSTITUTIONAL: No fever,   EYES: no acute visual disturbances  NECK: No pain or stiffness  RESPIRATORY: No cough; No shortness of breath  CARDIOVASCULAR: No chest pain, no palpitations  GASTROINTESTINAL: Rectal bleeding, No pain. No nausea or vomiting; No diarrhea   NEUROLOGICAL: No headache or numbness, no tremors  MUSCULOSKELETAL: No joint pain, no muscle pain  GENITOURINARY: no dysuria, no frequency, no hesitancy  PSYCHIATRY: no depression , no anxiety  ALL OTHER  ROS negative        Vital Signs Last 24 Hrs  T(C): 36.9 (21 Jun 2022 09:01), Max: 37.2 (21 Jun 2022 07:26)  T(F): 98.4 (21 Jun 2022 09:01), Max: 99 (21 Jun 2022 07:26)  HR: 86 (21 Jun 2022 09:01) (66 - 86)  BP: 147/67 (21 Jun 2022 09:01) (90/54 - 147/67)  BP(mean): --  RR: 18 (21 Jun 2022 09:01) (16 - 18)  SpO2: 100% (21 Jun 2022 09:01) (96% - 100%)    ________________________________________________  PHYSICAL EXAM:  GENERAL: NAD  HEENT: Normocephalic;  conjunctivae and sclerae clear; moist mucous membranes;   NECK : supple  CHEST/LUNG: Clear to auscultation bilaterally with good air entry   HEART: S1 S2  regular; no murmurs, gallops or rubs  ABDOMEN: Soft, Nontender, Nondistended; Bowel sounds present  EXTREMITIES: no cyanosis; no edema; no calf tenderness  SKIN: warm and dry; no rash  NERVOUS SYSTEM:  Awake and alert; Oriented  to place, person and time ; no new deficits    _________________________________________________  LABS:                        8.1    6.37  )-----------( 197      ( 21 Jun 2022 05:31 )             24.5     06-21    139  |  105  |  52<H>  ----------------------------<  208<H>  5.4<H>   |  28  |  1.31<H>    Ca    8.5      21 Jun 2022 05:31  Phos  3.7     06-21  Mg     2.1     06-21    TPro  5.9<L>  /  Alb  3.2<L>  /  TBili  0.2  /  DBili  x   /  AST  6<L>  /  ALT  15  /  AlkPhos  33<L>  06-21    PT/INR - ( 20 Jun 2022 14:24 )   PT: 14.7 sec;   INR: 1.23 ratio         PTT - ( 20 Jun 2022 14:24 )  PTT:32.8 sec    CAPILLARY BLOOD GLUCOSE      POCT Blood Glucose.: 220 mg/dL (21 Jun 2022 05:18)  POCT Blood Glucose.: 226 mg/dL (20 Jun 2022 16:48)        RADIOLOGY & ADDITIONAL TESTS:    ACC: [id number] EXAM:  CT ABDOMEN AND PELVIS IC                          PROCEDURE DATE:  06/20/2022          INTERPRETATION:  CLINICAL INFORMATION: Status post colon resection 7 2020, now with rectal bleeding and diffuse abdominal pain    COMPARISON: 4.14.22    CONTRAST/COMPLICATIONS:  IV Contrast: Omnipaque 350  90 cc administered   10 cc discarded  Oral Contrast: NONE  Complications: None reported at time of study completion    PROCEDURE:  CT of the Abdomen and Pelvis was performed.  Sagittal and coronal reformats were performed.    FINDINGS:  LOWER CHEST: Within normal limits.    LIVER: Within normal limits.  BILE DUCTS: Normal caliber.  GALLBLADDER: Cholelithiasis.  SPLEEN: Within normal limits.  PANCREAS: Within normal limits.  ADRENALS: Within normal limits.  KIDNEYS/URETERS: Bilateral renal hilar calcifications which are likely   renovascular. Right renal scarring. Small left renal cyst.    BLADDER: Within normal limits.  REPRODUCTIVE ORGANS: Uterus and adnexa within normal limits.    BOWEL: No bowel obstruction. Status post right colon resection and   ileocolic anastomosis with adjacent fat stranding compatible with   postoperative change. Small pocket of fluid adjacent to the anastomosis   measuring 2.1 cm. No extraluminalgas.  PERITONEUM: No ascites.  VESSELS: Atherosclerotic changes. Portal, superior mesenteric and splenic   veins are patent.  RETROPERITONEUM/LYMPH NODES: No lymphadenopathy.  ABDOMINAL WALL: Postsurgical changes.  BONES: Moderate L1 compression deformity, unchanged.  Old left superior   pubic ramus fracture. No acute findings.    IMPRESSION:  Status post right colon resection with expected postsurgical changes   adjacent to the ileocolic anastomosis and small (2 cm) fluid collection.   No drainable fluid collection.        --- End of Report ---            ISRAEL HANKINS MD; Attending Radiologist  This document has been electronically signed. Jun 20 2022  4:49PM    Imaging  Reviewed:  YES/NO    Consultant(s) Notes Reviewed:   YES/ No      Plan of care was discussed with patient and /or primary care giver; all questions and concerns were addressed

## 2022-06-21 NOTE — PATIENT PROFILE ADULT - PRO INTERPRETER NEED 2
[FreeTextEntry1] : mari has pain when walking both legs. She was seen by Dr. Lofton. Sent for doppler. Recommended more walking. Recent interrogation negative.  [place]

## 2022-06-21 NOTE — PROGRESS NOTE ADULT - ASSESSMENT
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

## 2022-06-21 NOTE — PROGRESS NOTE ADULT - SUBJECTIVE AND OBJECTIVE BOX
Patient is a 76y old  Female who presents with a chief complaint of GI bleed (20 Jun 2022 22:57)    pt seen in icu [  ], reg med floor [   ], bed [  ], chair at bedside [   ], a+o x3 [  ], lethargic [  ],  nad [  ]    elaine [  ], ngt [  ], peg [  ], et tube [  ], cent line [  ], picc line [  ]        Allergies    No Known Allergies        Vitals    T(F): 99 (06-21-22 @ 07:26), Max: 99 (06-21-22 @ 07:26)  HR: 77 (06-21-22 @ 07:26) (66 - 78)  BP: 122/65 (06-21-22 @ 07:26) (90/54 - 129/67)  RR: 18 (06-21-22 @ 07:26) (16 - 18)  SpO2: 98% (06-21-22 @ 07:26) (96% - 100%)  Wt(kg): --  CAPILLARY BLOOD GLUCOSE      POCT Blood Glucose.: 220 mg/dL (21 Jun 2022 05:18)      Labs                          8.1    6.37  )-----------( 197      ( 21 Jun 2022 05:31 )             24.5       06-21    139  |  105  |  52<H>  ----------------------------<  208<H>  5.4<H>   |  28  |  1.31<H>    Ca    8.5      21 Jun 2022 05:31  Phos  3.7     06-21  Mg     2.1     06-21    TPro  5.9<L>  /  Alb  3.2<L>  /  TBili  0.2  /  DBili  x   /  AST  6<L>  /  ALT  15  /  AlkPhos  33<L>  06-21                Radiology Results      Meds    MEDICATIONS  (STANDING):  insulin lispro (ADMELOG) corrective regimen sliding scale   SubCutaneous every 6 hours  levothyroxine Injectable 60 MICROGram(s) IV Push at bedtime  pantoprazole  Injectable 40 milliGRAM(s) IV Push two times a day      MEDICATIONS  (PRN):      Physical Exam    Neuro :  no focal deficits  Respiratory: CTA B/L  CV: RRR, S1S2, no murmurs,   Abdominal: Soft, NT, ND +BS,  Extremities: No edema, + peripheral pulses    ASSESSMENT    Melena    Atrial fibrillation and flutter    CAD (coronary artery disease)    Hypertension    DM (diabetes mellitus)    Hypothyroidism    Cataract    Breast cancer    Anemia    Mixed hyperlipidemia    Osteoporosis    Osteopenia    Colon cancer    No significant past surgical history    S/P CABG x 1    Cardiac pacemaker    S/P cardiac catheterization    History of left mastectomy    S/P right hemicolectomy        PLAN     Patient is a 76y old  Female who presents with a chief complaint of GI bleed (20 Jun 2022 22:57)    pt seen in icu [  ], reg med floor [ x  ], bed [ x ], chair at bedside [   ], a+o x3 [x  ], lethargic [  ],  nad [x  ]      Allergies    No Known Allergies        Vitals    T(F): 99 (06-21-22 @ 07:26), Max: 99 (06-21-22 @ 07:26)  HR: 77 (06-21-22 @ 07:26) (66 - 78)  BP: 122/65 (06-21-22 @ 07:26) (90/54 - 129/67)  RR: 18 (06-21-22 @ 07:26) (16 - 18)  SpO2: 98% (06-21-22 @ 07:26) (96% - 100%)  Wt(kg): --  CAPILLARY BLOOD GLUCOSE      POCT Blood Glucose.: 220 mg/dL (21 Jun 2022 05:18)      Labs                          8.1    6.37  )-----------( 197      ( 21 Jun 2022 05:31 )             24.5       06-21    139  |  105  |  52<H>  ----------------------------<  208<H>  5.4<H>   |  28  |  1.31<H>    Ca    8.5      21 Jun 2022 05:31  Phos  3.7     06-21  Mg     2.1     06-21    TPro  5.9<L>  /  Alb  3.2<L>  /  TBili  0.2  /  DBili  x   /  AST  6<L>  /  ALT  15  /  AlkPhos  33<L>  06-21                Radiology Results      Meds    MEDICATIONS  (STANDING):  insulin lispro (ADMELOG) corrective regimen sliding scale   SubCutaneous every 6 hours  levothyroxine Injectable 60 MICROGram(s) IV Push at bedtime  pantoprazole  Injectable 40 milliGRAM(s) IV Push two times a day      MEDICATIONS  (PRN):      Physical Exam    Neuro :  no focal deficits  Respiratory: CTA B/L  CV: RRR, S1S2, no murmurs,   Abdominal: Soft, R&L upper quad tender to moderate palp, ND +BS,  Extremities: No edema, + peripheral pulses      ASSESSMENT    symptomatic anemia,   brbpr 2nd to gi bleed,   r/o anastomotic bleed  h/o Colon CA (s/p right hemicolectomy on May 27,2022),   chronic Afib on Xarelto with micra transcatheter pacemaker implanted 7/17/2017,   CAD (s/p CABG in 2017) ,   Dementia,   HTN,   HLD,   osteoporosis,   T2DM,   OA   Breast cancer s/p left breast total mastectomy  Cataract      PLAN    s/p transfuse 2 units prbc,   cont protonix,   hold xarelto,   gi cons  h/h improved s/p transfusion  pt still with brbpr   cont monitor cbc q6   pt may need cta abd pelv if h/h cont to decrease   transfuse prbc as needed to maintain hgb above 8   surg onc f/u   card cons   heme onc cons  cont current meds.

## 2022-06-21 NOTE — CONSULT NOTE ADULT - SUBJECTIVE AND OBJECTIVE BOX
76F, admitted discharged 6-3-22 s/p laparoscopic extended right hemicolectomy. Pathology reports showed Stage 3 colon cancer. pT3: Tumor invades through the muscularis propria into leela-colorectal tissues. pN1b: Two or three regional lymph nodes are positive. Postoperatively patient recovered as expected; return of bowel function and progressively advanced to mechanical soft diet.   Pt was discharged home, restarting her xarelto,  with Hematology and surgical oncology FU.   Pt states that she has had on and off abdominal pain since discharge, however today she felt weak and was told by her caretaker that there was blood in her stool. There is report of near syncopal episode this am. No hemtemesis. No n/v. No fever. Last bm this am. Not yet started chemo.  Pt indicates chest area and generalized abdominal pain, but presently improved since in the ER.  No focal abdiminal pain reported.  Pt is on cardiac monitor presently with report of ekg changes.  ICU Vital Signs Last 24 Hrs  T(C): 37 (20 Jun 2022 15:52), Max: 37 (20 Jun 2022 15:52)  T(F): 98.6 (20 Jun 2022 15:52), Max: 98.6 (20 Jun 2022 15:52)  HR: 66 (20 Jun 2022 15:52) (66 - 74)  BP: 106/63 (20 Jun 2022 15:52) (90/54 - 106/63)  BP(mean): --  ABP: --  ABP(mean): --  RR: 16 (20 Jun 2022 15:52) (16 - 16)  SpO2: 100% (20 Jun 2022 15:52) (99% - 100%)  Alert nad  Abd: soft mild lower abdominal tenderness  no guarding no rebound tenderness.                          8.7    6.70  )-----------( 263      ( 20 Jun 2022 14:24 )             27.2   06-20    136  |  107  |  45<H>  ----------------------------<  218<H>  6.3<HH>   |  28  |  1.14    Ca    7.8<L>      20 Jun 2022 15:41    TPro  6.5  /  Alb  3.1<L>  /  TBili  0.1<L>  /  DBili  x   /  AST  11  /  ALT  20  /  AlkPhos  36<L>  06-20  < from: CT Abdomen and Pelvis w/ IV Cont (06.20.22 @ 16:35) >  CONTRAST/COMPLICATIONS:  IV Contrast: Omnipaque 350  90 cc administered   10 cc discarded  Oral Contrast: NONE  Complications: None reported at time of study completion    PROCEDURE:  CT of the Abdomen and Pelvis was performed.  Sagittal and coronal reformats were performed.    FINDINGS:  LOWER CHEST: Within normal limits.    LIVER: Within normal limits.  BILE DUCTS: Normal caliber.  GALLBLADDER: Cholelithiasis.  SPLEEN: Within normal limits.  PANCREAS: Within normal limits.  ADRENALS: Within normal limits.  KIDNEYS/URETERS: Bilateral renal hilar calcifications which are likely   renovascular. Right renal scarring. Small left renal cyst.    BLADDER: Within normal limits.  REPRODUCTIVE ORGANS: Uterus and adnexa within normal limits.    BOWEL: No bowel obstruction. Status post right colon resection and   ileocolic anastomosis with adjacent fat stranding compatible with   postoperative change. Small pocket of fluid adjacent to the anastomosis   measuring 2.1 cm. No extraluminalgas.  PERITONEUM: No ascites.  VESSELS: Atherosclerotic changes. Portal, superior mesenteric and splenic   veins are patent.  RETROPERITONEUM/LYMPH NODES: No lymphadenopathy.  ABDOMINAL WALL: Postsurgical changes.  BONES: Moderate L1 compression deformity, unchanged.  Old left superior   pubic ramus fracture. No acute findings.    IMPRESSION:  Status post right colon resection with expected postsurgical changes   adjacent to the ileocolic anastomosis and small (2 cm) fluid collection.   No drainable fluid collection.    < end of copied text >  
CHIEF COMPLAINT  GI Bleed    HISTORY OF PRESENT ILLNESS  MALISSA CHAPARRO is a 76y Female who presents with a chief complaint of GI bleed    Patient was admitted on June 20th after presenting with episodes of melena earlier along with abdominal pain. She recently had underwent right hemicolectomy in late May 2022. In the Emergency Department she was found to have severe anemia with hemoglobin of 6.8, and she was admitted for further evaluation and management. Today she continues to endorse abdominal discomfort.     PAST MEDICAL AND SURGICAL HISTORY  Coronary Artery Disease  Breast Cancer  Atrial Fibrillation  Colon Cancer  Diabetes Mellitus  Hypertension  Hypothyroidism    FAMILY HISTORY  Non-contributory    SOCIAL HISTORY  Denies tobacco use    REVIEW OF SYSTEMS  A complete review of systems was performed; negative except per HPI    PHYSICAL EXAM  T(C): 36.9 (06-21-22 @ 09:01), Max: 37.2 (06-21-22 @ 07:26)  HR: 86 (06-21-22 @ 09:01) (66 - 86)  BP: 147/67 (06-21-22 @ 09:01) (90/54 - 147/67)  RR: 18 (06-21-22 @ 09:01) (16 - 18)  SpO2: 100% (06-21-22 @ 09:01) (96% - 100%)  Constitutional: alert, awake, in no acute distress  Eyes: PERRL, EOMI  HEENT: normocephalic, atraumatic  Neck: supple, non-tender  Cardiovascular: normal perfusion, no peripheral edema  Respiratory: normal respiratory efforts; no increased use of accessory muscles  Gastrointestinal: soft, non-tender  Musculoskeletal: normal range of motion, no deformities noted  Neurological: alert, CN II to XI grossly intact  Skin: warm, dry    LABORATORY DATA                        8.1    6.37  )-----------( 197      ( 21 Jun 2022 05:31 )             24.5     06-21    139  |  105  |  52<H>  ----------------------------<  208<H>  5.4<H>   |  28  |  1.31<H>    Ca    8.5      21 Jun 2022 05:31  Phos  3.7     06-21  Mg     2.1     06-21    TPro  5.9<L>  /  Alb  3.2<L>  /  TBili  0.2  /  DBili  x   /  AST  6<L>  /  ALT  15  /  AlkPhos  33<L>  06-21    RADIOLOGY REVIEW  IMPRESSION:  Status post right colon resection with expected postsurgical changes   adjacent to the ileocolic anastomosis and small (2 cm) fluid collection.   No drainable fluid collection.  
[  ] STAT REQUEST              [ X ] ROUTINE REQUEST    Patient is a 76 year old female with GI bleeding. GI consulted to evaluate.       HPI:  Pt is a 76 year old female from home with past medical history significant for Colon CA (s/p right hemicolectomy on May 27,2022), Afib (on Xarelto with micra transcatheter pacemaker implanted 7/17/2017), CAD (s/p CABG in 2017) , Dementia, HTN, HLD, osteoporosis, T2DM, OA and PSH of left breast total mastectomy presented to emergency room with melena. Patient is a poor historian due to poor cognition. According to daughter, she had two episodes of black stools today. She also was complaining of abdominal pain. No c/o chest pain, shortness of breath, fever, headache, N/V, urinary complaints.         PAIN MANAGEMENT:  Pain Scale:                ? /10  Pain Location:      Prior Colonoscopy:  04/14/2022    PAST MEDICAL HISTORY  Atrial fibrillation and flutter  CAD (coronary artery disease)  Hypertension  DM (diabetes mellitus)  Hypothyroidism  Cataract  Breast cancer  Anemia  Mixed hyperlipidemia  Osteoporosis  Osteopenia  Colon cancer        PAST SURGICAL HISTORY  CABG x 1  Cardiac pacemaker  Cardiac catheterization  Left mastectomy  Right hemicolectomy        Allergies    No Known Allergies    Intolerances  None         MEDICATIONS  (STANDING):  insulin lispro (ADMELOG) corrective regimen sliding scale   SubCutaneous every 6 hours  levothyroxine Injectable 60 MICROGram(s) IV Push at bedtime  pantoprazole  Injectable 40 milliGRAM(s) IV Push two times a day         SOCIAL HISTORY  Advanced Directives:       [ X ] Full Code       [  ] DNR  Marital Status:         [  ] M      [ X ] S      [  ] D       [  ] W  Children:       [X  ] Yes      [  ] No  Occupation:        [  ] Employed       [ X ] Unemployed       [  ] Retired  Diet:       [ X ] Regular       [  ] PEG feeding          [  ] NG tube feeding  Drug Use:           [X  ] Patient denied          [  ] Yes  Alcohol:           [ X ] No             [  ] Yes (socially)         [  ] Yes (chronic)  Tobacco:           [  ] Yes           [ X ] No      FAMILY HISTORY  [ X ] Heart Disease            [ X ] Diabetes             [ X ] HTN             [  ] Colon Cancer             [  ] Stomach Cancer              [  ] Pancreatic Cancer      VITAL SIGNS   Vital Signs Last 24 Hrs  T(C): 37.1 (21 Jun 2022 13:48), Max: 37.2 (21 Jun 2022 07:26)  T(F): 98.7 (21 Jun 2022 13:48), Max: 99 (21 Jun 2022 07:26)  HR: 76 (21 Jun 2022 13:48) (72 - 86)  BP: 111/63 (21 Jun 2022 13:48) (93/64 - 147/67)   RR: 18 (21 Jun 2022 13:48) (16 - 18)  SpO2: 97% (21 Jun 2022 13:48) (96% - 100%)         CBC Full  -  ( 21 Jun 2022 12:37 )  WBC Count : 5.75 K/uL  RBC Count : 2.52 M/uL  Hemoglobin : 7.6 g/dL  Hematocrit : 23.8 %  Platelet Count - Automated : 174 K/uL  Mean Cell Volume : 94.4 fl  Mean Cell Hemoglobin : 30.2 pg  Mean Cell Hemoglobin Concentration : 31.9 gm/dL  Auto Neutrophil # : x  Auto Lymphocyte # : x  Auto Monocyte # : x  Auto Eosinophil # : x  Auto Basophil # : x  Auto Neutrophil % : x  Auto Lymphocyte % : x  Auto Monocyte % : x  Auto Eosinophil % : x  Auto Basophil % : x      06-21    139  |  105  |  52<H>  ----------------------------<  208<H>  5.4<H>   |  28  |  1.31<H>    Ca    8.5      21 Jun 2022 05:31  Phos  3.7     06-21  Mg     2.1     06-21    TPro  5.9<L>  /  Alb  3.2<L>  /  TBili  0.2  /  DBili  x   /  AST  6<L>  /  ALT  15  /  AlkPhos  33<L>  06-21     PT/INR - ( 20 Jun 2022 14:24 )   PT: 14.7 sec;   INR: 1.23 ratio       PTT - ( 20 Jun 2022 14:24 )  PTT:32.8 sec    Iron with Total Binding Capacity (04.08.22 @ 19:09)   Iron - Total Binding Capacity.: 339 ug/dL   % Saturation, Iron: 32 %   Iron Total, Serum: 108 ug/dL   Unsaturated Iron Binding Capacity: 231 ug/dL          ECG    Ventricular Rate 67 BPM    Atrial Rate 67 BPM    P-R Interval 172 ms    QRS Duration 92 ms    Q-T Interval 430 ms    QTC Calculation(Bazett) 454 ms    P Axis 62 degrees    R Axis 42 degrees    T Axis 65 degrees    Diagnosis Line Normal sinus rhythm  Normal ECG         RADIOLOGY/IMAGING                  ACC: [id number] EXAM:  CT ABDOMEN AND PELVIS IC                          PROCEDURE DATE:  06/20/2022          INTERPRETATION:  CLINICAL INFORMATION: Status post colon resection 7 2020, now with rectal bleeding and diffuse abdominal pain    COMPARISON: 4.14.22    CONTRAST/COMPLICATIONS:  IV Contrast: Omnipaque 350  90 cc administered   10 cc discarded  Oral Contrast: NONE  Complications: None reported at time of study completion    PROCEDURE:  CT of the Abdomen and Pelvis was performed.  Sagittal and coronal reformats were performed.    FINDINGS:  LOWER CHEST: Within normal limits.    LIVER: Within normal limits.  BILE DUCTS: Normal caliber.  GALLBLADDER: Cholelithiasis.  SPLEEN: Within normal limits.  PANCREAS: Within normal limits.  ADRENALS: Within normal limits.  KIDNEYS/URETERS: Bilateral renal hilar calcifications which are likely   renovascular. Right renal scarring. Small left renal cyst.    BLADDER: Within normal limits.  REPRODUCTIVE ORGANS: Uterus and adnexa within normal limits.    BOWEL: No bowel obstruction. Status post right colon resection and   ileocolic anastomosis with adjacent fat stranding compatible with   postoperative change. Small pocket of fluid adjacent to the anastomosis   measuring 2.1 cm. No extraluminalgas.  PERITONEUM: No ascites.  VESSELS: Atherosclerotic changes. Portal, superior mesenteric and splenic   veins are patent.  RETROPERITONEUM/LYMPH NODES: No lymphadenopathy.  ABDOMINAL WALL: Postsurgical changes.  BONES: Moderate L1 compression deformity, unchanged.  Old left superior   pubic ramus fracture. No acute findings.    IMPRESSION:  Status post right colon resection with expected postsurgical changes   adjacent to the ileocolic anastomosis and small (2 cm) fluid collection.   No drainable fluid collection.

## 2022-06-21 NOTE — PROGRESS NOTE ADULT - SUBJECTIVE AND OBJECTIVE BOX
SURGERY PROGRESS NOTE     Patient seen at the bedside .GCS 15 , breathing well in room air , hemodynamically stable. but received 1 Units PRBC overnight and responded appropriately .   Still NPO.  DVT ppx on hold at this time. Passed a bloody bowel movement with both blood and melena.     Physical Exam   ICU Vital Signs Last 24 Hrs  T(C): 36.9 (21 Jun 2022 09:01), Max: 37.2 (21 Jun 2022 07:26)  T(F): 98.4 (21 Jun 2022 09:01), Max: 99 (21 Jun 2022 07:26)  HR: 86 (21 Jun 2022 09:01) (66 - 86)  BP: 147/67 (21 Jun 2022 09:01) (90/54 - 147/67)  BP(mean): --  ABP: --  ABP(mean): --  RR: 18 (21 Jun 2022 09:01) (16 - 18)  SpO2: 100% (21 Jun 2022 09:01) (96% - 100%)        Gen : Pt stable with no acute complaints, GCS 15   Resp :clear breath sounds b/l, no rhonchi , wheezing, rales, TF normal    Gen  midline surgical incision and lap surgical inicisions clean and dry w/o any signs of infection       Labs                         8.1    6.37  )-----------( 197      ( 21 Jun 2022 05:31 )             24.5   06-21    139  |  105  |  52<H>  ----------------------------<  208<H>  5.4<H>   |  28  |  1.31<H>    Ca    8.5      21 Jun 2022 05:31  Phos  3.7     06-21  Mg     2.1     06-21    TPro  5.9<L>  /  Alb  3.2<L>  /  TBili  0.2  /  DBili  x   /  AST  6<L>  /  ALT  15  /  AlkPhos  33<L>  06-21      Imaging   - CT Abdomen and Pelvis w/ IV Cont (06.20.22 @ 16:35) > Status post right colon resection with expected postsurgical changes  adjacent to the ileocolic anastomosis and small (2 cm) fluid collection.  No drainable fluid collection.

## 2022-06-22 ENCOUNTER — APPOINTMENT (OUTPATIENT)
Dept: SURGICAL ONCOLOGY | Facility: CLINIC | Age: 77
End: 2022-06-22

## 2022-06-22 ENCOUNTER — TRANSCRIPTION ENCOUNTER (OUTPATIENT)
Age: 77
End: 2022-06-22

## 2022-06-22 ENCOUNTER — RESULT REVIEW (OUTPATIENT)
Age: 77
End: 2022-06-22

## 2022-06-22 DIAGNOSIS — Z85.3 PERSONAL HISTORY OF MALIGNANT NEOPLASM OF BREAST: ICD-10-CM

## 2022-06-22 DIAGNOSIS — E03.9 HYPOTHYROIDISM, UNSPECIFIED: ICD-10-CM

## 2022-06-22 DIAGNOSIS — E11.9 TYPE 2 DIABETES MELLITUS W/OUT COMPLICATIONS: ICD-10-CM

## 2022-06-22 DIAGNOSIS — E78.5 HYPERLIPIDEMIA, UNSPECIFIED: ICD-10-CM

## 2022-06-22 DIAGNOSIS — I10 ESSENTIAL (PRIMARY) HYPERTENSION: ICD-10-CM

## 2022-06-22 DIAGNOSIS — Z02.9 ENCOUNTER FOR ADMINISTRATIVE EXAMINATIONS, UNSPECIFIED: ICD-10-CM

## 2022-06-22 LAB
ALBUMIN SERPL ELPH-MCNC: 3.1 G/DL — LOW (ref 3.5–5)
ALP SERPL-CCNC: 37 U/L — LOW (ref 40–120)
ALT FLD-CCNC: 19 U/L DA — SIGNIFICANT CHANGE UP (ref 10–60)
ANION GAP SERPL CALC-SCNC: 10 MMOL/L — SIGNIFICANT CHANGE UP (ref 5–17)
AST SERPL-CCNC: 19 U/L — SIGNIFICANT CHANGE UP (ref 10–40)
BILIRUB SERPL-MCNC: 0.3 MG/DL — SIGNIFICANT CHANGE UP (ref 0.2–1.2)
BUN SERPL-MCNC: 29 MG/DL — HIGH (ref 7–18)
CALCIUM SERPL-MCNC: 8.3 MG/DL — LOW (ref 8.4–10.5)
CHLORIDE SERPL-SCNC: 109 MMOL/L — HIGH (ref 96–108)
CO2 SERPL-SCNC: 23 MMOL/L — SIGNIFICANT CHANGE UP (ref 22–31)
CREAT SERPL-MCNC: 1.09 MG/DL — SIGNIFICANT CHANGE UP (ref 0.5–1.3)
EGFR: 53 ML/MIN/1.73M2 — LOW
GLUCOSE BLDC GLUCOMTR-MCNC: 148 MG/DL — HIGH (ref 70–99)
GLUCOSE BLDC GLUCOMTR-MCNC: 163 MG/DL — HIGH (ref 70–99)
GLUCOSE BLDC GLUCOMTR-MCNC: 178 MG/DL — HIGH (ref 70–99)
GLUCOSE BLDC GLUCOMTR-MCNC: 182 MG/DL — HIGH (ref 70–99)
GLUCOSE SERPL-MCNC: 177 MG/DL — HIGH (ref 70–99)
HCT VFR BLD CALC: 26.6 % — LOW (ref 34.5–45)
HGB BLD-MCNC: 9 G/DL — LOW (ref 11.5–15.5)
MCHC RBC-ENTMCNC: 30.9 PG — SIGNIFICANT CHANGE UP (ref 27–34)
MCHC RBC-ENTMCNC: 33.8 GM/DL — SIGNIFICANT CHANGE UP (ref 32–36)
MCV RBC AUTO: 91.4 FL — SIGNIFICANT CHANGE UP (ref 80–100)
NRBC # BLD: 0 /100 WBCS — SIGNIFICANT CHANGE UP (ref 0–0)
PLATELET # BLD AUTO: 169 K/UL — SIGNIFICANT CHANGE UP (ref 150–400)
POTASSIUM SERPL-MCNC: 4.3 MMOL/L — SIGNIFICANT CHANGE UP (ref 3.5–5.3)
POTASSIUM SERPL-SCNC: 4.3 MMOL/L — SIGNIFICANT CHANGE UP (ref 3.5–5.3)
PROT SERPL-MCNC: 6.3 G/DL — SIGNIFICANT CHANGE UP (ref 6–8.3)
RBC # BLD: 2.91 M/UL — LOW (ref 3.8–5.2)
RBC # FLD: 16.6 % — HIGH (ref 10.3–14.5)
SODIUM SERPL-SCNC: 142 MMOL/L — SIGNIFICANT CHANGE UP (ref 135–145)
WBC # BLD: 5 K/UL — SIGNIFICANT CHANGE UP (ref 3.8–10.5)
WBC # FLD AUTO: 5 K/UL — SIGNIFICANT CHANGE UP (ref 3.8–10.5)

## 2022-06-22 PROCEDURE — 99232 SBSQ HOSP IP/OBS MODERATE 35: CPT

## 2022-06-22 PROCEDURE — 88305 TISSUE EXAM BY PATHOLOGIST: CPT | Mod: 26

## 2022-06-22 PROCEDURE — 88312 SPECIAL STAINS GROUP 1: CPT | Mod: 26

## 2022-06-22 DEVICE — CATH BALLOON DIL 6-8MM: Type: IMPLANTABLE DEVICE | Status: FUNCTIONAL

## 2022-06-22 DEVICE — CATH ESOPH DIL 8 ATM 6FR10-12M: Type: IMPLANTABLE DEVICE | Status: FUNCTIONAL

## 2022-06-22 DEVICE — CATH ESOPH DIL 9 ATM 6FR 8-10MM: Type: IMPLANTABLE DEVICE | Status: FUNCTIONAL

## 2022-06-22 RX ORDER — SODIUM CHLORIDE 9 MG/ML
1000 INJECTION INTRAMUSCULAR; INTRAVENOUS; SUBCUTANEOUS
Refills: 0 | Status: DISCONTINUED | OUTPATIENT
Start: 2022-06-22 | End: 2022-06-24

## 2022-06-22 RX ADMIN — Medication 60 MICROGRAM(S): at 23:18

## 2022-06-22 RX ADMIN — PANTOPRAZOLE SODIUM 40 MILLIGRAM(S): 20 TABLET, DELAYED RELEASE ORAL at 06:45

## 2022-06-22 RX ADMIN — SODIUM CHLORIDE 100 MILLILITER(S): 9 INJECTION INTRAMUSCULAR; INTRAVENOUS; SUBCUTANEOUS at 06:42

## 2022-06-22 RX ADMIN — Medication 1: at 06:46

## 2022-06-22 RX ADMIN — PANTOPRAZOLE SODIUM 40 MILLIGRAM(S): 20 TABLET, DELAYED RELEASE ORAL at 18:51

## 2022-06-22 RX ADMIN — Medication 1: at 11:58

## 2022-06-22 NOTE — PROGRESS NOTE ADULT - SUBJECTIVE AND OBJECTIVE BOX
Patient is a 76y old  Female who presents with a chief complaint of GI bleed (21 Jun 2022 16:29)    pt seen in icu [  ], reg med floor [   ], bed [  ], chair at bedside [   ], a+o x3 [  ], lethargic [  ],  nad [  ]    elaine [  ], ngt [  ], peg [  ], et tube [  ], cent line [  ], picc line [  ]        Allergies    No Known Allergies        Vitals    T(F): 98.1 (06-22-22 @ 05:42), Max: 98.7 (06-21-22 @ 13:48)  HR: 84 (06-22-22 @ 05:42) (76 - 84)  BP: 145/75 (06-22-22 @ 05:42) (111/63 - 145/75)  RR: 18 (06-22-22 @ 05:42) (18 - 20)  SpO2: 96% (06-22-22 @ 05:42) (96% - 97%)  Wt(kg): --  CAPILLARY BLOOD GLUCOSE      POCT Blood Glucose.: 182 mg/dL (22 Jun 2022 05:59)      Labs                          9.0    5.00  )-----------( 169      ( 22 Jun 2022 07:39 )             26.6       06-22    142  |  109<H>  |  29<H>  ----------------------------<  177<H>  4.3   |  23  |  1.09    Ca    8.3<L>      22 Jun 2022 07:39  Phos  3.7     06-21  Mg     2.1     06-21    TPro  6.3  /  Alb  3.1<L>  /  TBili  0.3  /  DBili  x   /  AST  19  /  ALT  19  /  AlkPhos  37<L>  06-22                Radiology Results      Meds    MEDICATIONS  (STANDING):  insulin lispro (ADMELOG) corrective regimen sliding scale   SubCutaneous every 6 hours  levothyroxine Injectable 60 MICROGram(s) IV Push at bedtime  pantoprazole  Injectable 40 milliGRAM(s) IV Push two times a day  sodium chloride 0.9%. 1000 milliLiter(s) (100 mL/Hr) IV Continuous <Continuous>      MEDICATIONS  (PRN):      Physical Exam    Neuro :  no focal deficits  Respiratory: CTA B/L  CV: RRR, S1S2, no murmurs,   Abdominal: Soft, NT, ND +BS,  Extremities: No edema, + peripheral pulses    ASSESSMENT    Melena    Atrial fibrillation and flutter    CAD (coronary artery disease)    Hypertension    DM (diabetes mellitus)    Hypothyroidism    Cataract    Breast cancer    Anemia    Mixed hyperlipidemia    Osteoporosis    Osteopenia    Colon cancer    No significant past surgical history    S/P CABG x 1    Cardiac pacemaker    S/P cardiac catheterization    History of left mastectomy    S/P right hemicolectomy        PLAN     Patient is a 76y old  Female who presents with a chief complaint of GI bleed (21 Jun 2022 16:29)    pt seen in icu [  ], reg med floor [  x ], bed [ x ], chair at bedside [   ], a+o x3 [ x ], lethargic [  ],  nad [x  ]          Allergies    No Known Allergies        Vitals    T(F): 98.1 (06-22-22 @ 05:42), Max: 98.7 (06-21-22 @ 13:48)  HR: 84 (06-22-22 @ 05:42) (76 - 84)  BP: 145/75 (06-22-22 @ 05:42) (111/63 - 145/75)  RR: 18 (06-22-22 @ 05:42) (18 - 20)  SpO2: 96% (06-22-22 @ 05:42) (96% - 97%)  Wt(kg): --  CAPILLARY BLOOD GLUCOSE      POCT Blood Glucose.: 182 mg/dL (22 Jun 2022 05:59)      Labs                          9.0    5.00  )-----------( 169      ( 22 Jun 2022 07:39 )             26.6       06-22    142  |  109<H>  |  29<H>  ----------------------------<  177<H>  4.3   |  23  |  1.09    Ca    8.3<L>      22 Jun 2022 07:39  Phos  3.7     06-21  Mg     2.1     06-21    TPro  6.3  /  Alb  3.1<L>  /  TBili  0.3  /  DBili  x   /  AST  19  /  ALT  19  /  AlkPhos  37<L>  06-22                Radiology Results      Meds    MEDICATIONS  (STANDING):  insulin lispro (ADMELOG) corrective regimen sliding scale   SubCutaneous every 6 hours  levothyroxine Injectable 60 MICROGram(s) IV Push at bedtime  pantoprazole  Injectable 40 milliGRAM(s) IV Push two times a day  sodium chloride 0.9%. 1000 milliLiter(s) (100 mL/Hr) IV Continuous <Continuous>      MEDICATIONS  (PRN):      Physical Exam    Neuro :  no focal deficits  Respiratory: CTA B/L  CV: RRR, S1S2, no murmurs,   Abdominal: Soft, R&L upper quad tender to moderate palp, ND +BS,  Extremities: No edema, + peripheral pulses      ASSESSMENT    symptomatic anemia,   brbpr 2nd to gi bleed,   r/o anastomotic bleed  h/o Colon CA (s/p right hemicolectomy on May 27,2022),   chronic Afib on Xarelto with micra transcatheter pacemaker implanted 7/17/2017,   CAD (s/p CABG in 2017) ,   Dementia,   HTN,   HLD,   osteoporosis,   T2DM,   OA   Breast cancer s/p left breast total mastectomy  Cataract      PLAN    s/p transfuse 4 units prbc,   hold xarelto,   gi f/u   h/h improved s/p transfusion  Protonix IV  Avoid NSAID  f/u EGD  pt may need cta abd pelv if h/h cont to decrease   transfuse prbc as needed to maintain hgb above 8   surg onc f/u   If bleeding continues and EGD is negative, a CT angiogram may be necessary  heme onc cons   pt to follow-up outpatient to discuss adjuvant chemotherapy candidacy.   On discharge, patient to follow-up with Dr. Mook Rollins.  cont current meds.

## 2022-06-22 NOTE — PROGRESS NOTE ADULT - SUBJECTIVE AND OBJECTIVE BOX
NP Note discussed with  Primary Attending    Patient is a 76y old  Female who presents with a chief complaint of GI bleed (22 Jun 2022 11:28)      INTERVAL HPI/OVERNIGHT EVENTS: no new complaints    MEDICATIONS  (STANDING):  insulin lispro (ADMELOG) corrective regimen sliding scale   SubCutaneous every 6 hours  levothyroxine Injectable 60 MICROGram(s) IV Push at bedtime  pantoprazole  Injectable 40 milliGRAM(s) IV Push two times a day  sodium chloride 0.9%. 1000 milliLiter(s) (100 mL/Hr) IV Continuous <Continuous>    MEDICATIONS  (PRN):      __________________________________________________  REVIEW OF SYSTEMS: No complaints     CONSTITUTIONAL: No fever,   EYES: no acute visual disturbances  NECK: No pain or stiffness  RESPIRATORY: No cough; No shortness of breath  CARDIOVASCULAR: No chest pain, no palpitations  GASTROINTESTINAL: No pain. No nausea or vomiting; No diarrhea   NEUROLOGICAL: No headache or numbness, no tremors  MUSCULOSKELETAL: No joint pain, no muscle pain  GENITOURINARY: no dysuria, no frequency, no hesitancy  PSYCHIATRY: no depression , no anxiety  ALL OTHER  ROS negative        Vital Signs Last 24 Hrs  T(C): 37 (22 Jun 2022 12:38), Max: 37.1 (21 Jun 2022 13:48)  T(F): 98.6 (22 Jun 2022 12:38), Max: 98.7 (21 Jun 2022 13:48)  HR: 66 (22 Jun 2022 12:38) (66 - 84)  BP: 156/64 (22 Jun 2022 12:38) (111/63 - 156/64)  BP(mean): --  RR: 18 (22 Jun 2022 12:38) (18 - 20)  SpO2: 98% (22 Jun 2022 12:38) (96% - 98%)    ________________________________________________  PHYSICAL EXAM:  GENERAL: NAD, afebrile.   HEENT: Normocephalic;  conjunctivae and sclerae clear; moist mucous membranes;   NECK : supple  CHEST/LUNG: Clear to auscultation bilaterally with good air entry. Left breast total mastectomy. Vertical scar to the chest.   HEART: S1 S2  regular; no murmurs, gallops or rubs  ABDOMEN: Soft, Nontender, Nondistended; Bowel sounds present  EXTREMITIES: no cyanosis; no edema; no calf tenderness  SKIN: warm and dry; no rash  NERVOUS SYSTEM:  Awake and alert; Oriented  to place, person and time ; no new deficits    _________________________________________________  LABS:                        9.0    5.00  )-----------( 169      ( 22 Jun 2022 07:39 )             26.6     06-22    142  |  109<H>  |  29<H>  ----------------------------<  177<H>  4.3   |  23  |  1.09    Ca    8.3<L>      22 Jun 2022 07:39  Phos  3.7     06-21  Mg     2.1     06-21    TPro  6.3  /  Alb  3.1<L>  /  TBili  0.3  /  DBili  x   /  AST  19  /  ALT  19  /  AlkPhos  37<L>  06-22    PT/INR - ( 20 Jun 2022 14:24 )   PT: 14.7 sec;   INR: 1.23 ratio         PTT - ( 20 Jun 2022 14:24 )  PTT:32.8 sec    CAPILLARY BLOOD GLUCOSE  POCT Blood Glucose.: 178 mg/dL (22 Jun 2022 11:47)  POCT Blood Glucose.: 182 mg/dL (22 Jun 2022 05:59)  POCT Blood Glucose.: 163 mg/dL (22 Jun 2022 00:11)  POCT Blood Glucose.: 172 mg/dL (21 Jun 2022 16:51)    RADIOLOGY & ADDITIONAL TESTS:  < from: CT Abdomen and Pelvis w/ IV Cont (06.20.22 @ 16:35) >  ACC: [id number] EXAM:  CT ABDOMEN AND PELVIS IC                          PROCEDURE DATE:  06/20/2022      INTERPRETATION:  CLINICAL INFORMATION: Status post colon resection 7 2020, now with rectal bleeding and diffuse abdominal pain    COMPARISON: 4.14.22    CONTRAST/COMPLICATIONS:  IV Contrast: Omnipaque 350  90 cc administered   10 cc discarded  Oral Contrast: NONE  Complications: None reported at time of study completion    PROCEDURE:  CT of the Abdomen and Pelvis was performed.  Sagittal and coronal reformats were performed.    FINDINGS:  LOWER CHEST: Within normal limits.    LIVER: Within normal limits.  BILE DUCTS: Normal caliber.  GALLBLADDER: Cholelithiasis.  SPLEEN: Within normal limits.  PANCREAS: Within normal limits.  ADRENALS: Within normal limits.  KIDNEYS/URETERS: Bilateral renal hilar calcifications which are likely   renovascular. Right renal scarring. Small left renal cyst.    BLADDER: Within normal limits.  REPRODUCTIVE ORGANS: Uterus and adnexa within normal limits.    BOWEL: No bowel obstruction. Status post right colon resection and   ileocolic anastomosis with adjacent fat stranding compatible with   postoperative change. Small pocket of fluid adjacent to the anastomosis   measuring 2.1 cm. No extraluminalgas.  PERITONEUM: No ascites.  VESSELS: Atherosclerotic changes. Portal, superior mesenteric and splenic   veins are patent.  RETROPERITONEUM/LYMPH NODES: No lymphadenopathy.  ABDOMINAL WALL: Postsurgical changes.  BONES: Moderate L1 compression deformity, unchanged.  Old left superior   pubic ramus fracture. No acute findings.    IMPRESSION:  Status post right colon resection with expected postsurgical changes   adjacent to the ileocolic anastomosis and small (2 cm) fluid collection.   No drainable fluid collection.        --- End of Report ---      ISRAEL HANKINS MD; Attending Radiologist  This document has been electronically signed. Jun 20 2022  4:49PM    < end of copied text >    Imaging Personally Reviewed:  YES/NO    Consultant(s) Notes Reviewed:   YES/ No    Care Discussed with Consultants :     Plan of care was discussed with patient and /or primary care giver; all questions and concerns were addressed and care was aligned with patient's wishes.

## 2022-06-22 NOTE — PROGRESS NOTE ADULT - SUBJECTIVE AND OBJECTIVE BOX
SURGERY PROGRESS NOTE     Patient seen at the bedside .GCS 15 , breathing well in room air , hemodynamically stable.  received 1 Units PrBC yesterday ( 2 intotla from admission ) and hb stable with several repeat .   Still NPO.  DVT ppx on hold at this time. Passe both bloody and melanotic BM overnight . GI involved with planned Upper GI endoscopy for today 6/22    Physical Exam   ICU Vital Signs Last 24 Hrs  T(C): 36.7 (22 Jun 2022 05:42), Max: 37.1 (21 Jun 2022 13:48)  T(F): 98.1 (22 Jun 2022 05:42), Max: 98.7 (21 Jun 2022 13:48)  HR: 84 (22 Jun 2022 05:42) (76 - 84)  BP: 145/75 (22 Jun 2022 05:42) (111/63 - 145/75)  BP(mean): --  ABP: --  ABP(mean): --  RR: 18 (22 Jun 2022 05:42) (18 - 20)  SpO2: 96% (22 Jun 2022 05:42) (96% - 97%)    Gen : Pt stable with no acute complaints, GCS 15   Resp :clear breath sounds b/l, no rhonchi , wheezing, rales, TF normal    Gen  midline surgical incision and lap surgical incisions clean and dry w/o any signs of infection       Labs                         9.0    5.00  )-----------( 169      ( 22 Jun 2022 07:39 )             26.6   06-22    142  |  109<H>  |  29<H>  ----------------------------<  177<H>  4.3   |  23  |  1.09    Ca    8.3<L>      22 Jun 2022 07:39  Phos  3.7     06-21  Mg     2.1     06-21    TPro  6.3  /  Alb  3.1<L>  /  TBili  0.3  /  DBili  x   /  AST  19  /  ALT  19  /  AlkPhos  37<L>  06-22

## 2022-06-22 NOTE — PROGRESS NOTE ADULT - ASSESSMENT
A/P: 76 F s/p  right hemicolectomy on 5/27/2022 for colon adenocarcinoma T3N1  admitted for  possible upper Gi bleeding   - f/u GI with upper GI endoscopy   - trend hb/Hct q 8  - Surgery will follow up

## 2022-06-22 NOTE — PROGRESS NOTE ADULT - ASSESSMENT
Pt is a 77 yo F from home with PMH of Colon CA (s/p right hemicolectomy on May 27,2022), Afib (on Xarelto with micra transcatheter pacemaker implanted 7/17/2017), CAD (s/p CABG in 2017) , Dementia, HTN, HLD, osteoporosis, T2DM, OA and PSH of left breast total mastectomy who initially presented to the ED with melena from this morning. Pt is a poor historian due to poor cognition. According to daughter, she had two episodes of black stools before admission. She was admitted to [facility] on May 27th for right hemicolectomy for the diagnosis of colon CA. Hem/onc Dr Sandhu evaluated pt for possible outpatient visit to discuss adjuvant chemotherapy candidacy.   Patient admitted to medicine for GIB, s/p 2PRBC with improvement in H&H. Surgery, GI and Hem/onc following. Plan for EGD today. Colonoscopy not advisable as she undergo recent hemicolectomy.

## 2022-06-23 ENCOUNTER — TRANSCRIPTION ENCOUNTER (OUTPATIENT)
Age: 77
End: 2022-06-23

## 2022-06-23 LAB
ANION GAP SERPL CALC-SCNC: 8 MMOL/L — SIGNIFICANT CHANGE UP (ref 5–17)
BUN SERPL-MCNC: 16 MG/DL — SIGNIFICANT CHANGE UP (ref 7–18)
CALCIUM SERPL-MCNC: 8.8 MG/DL — SIGNIFICANT CHANGE UP (ref 8.4–10.5)
CHLORIDE SERPL-SCNC: 108 MMOL/L — SIGNIFICANT CHANGE UP (ref 96–108)
CO2 SERPL-SCNC: 25 MMOL/L — SIGNIFICANT CHANGE UP (ref 22–31)
CREAT SERPL-MCNC: 1.01 MG/DL — SIGNIFICANT CHANGE UP (ref 0.5–1.3)
EGFR: 58 ML/MIN/1.73M2 — LOW
GLUCOSE BLDC GLUCOMTR-MCNC: 162 MG/DL — HIGH (ref 70–99)
GLUCOSE BLDC GLUCOMTR-MCNC: 164 MG/DL — HIGH (ref 70–99)
GLUCOSE BLDC GLUCOMTR-MCNC: 183 MG/DL — HIGH (ref 70–99)
GLUCOSE BLDC GLUCOMTR-MCNC: 204 MG/DL — HIGH (ref 70–99)
GLUCOSE SERPL-MCNC: 166 MG/DL — HIGH (ref 70–99)
HCT VFR BLD CALC: 27.1 % — LOW (ref 34.5–45)
HGB BLD-MCNC: 9.1 G/DL — LOW (ref 11.5–15.5)
MCHC RBC-ENTMCNC: 31.1 PG — SIGNIFICANT CHANGE UP (ref 27–34)
MCHC RBC-ENTMCNC: 33.6 GM/DL — SIGNIFICANT CHANGE UP (ref 32–36)
MCV RBC AUTO: 92.5 FL — SIGNIFICANT CHANGE UP (ref 80–100)
NRBC # BLD: 0 /100 WBCS — SIGNIFICANT CHANGE UP (ref 0–0)
PLATELET # BLD AUTO: 146 K/UL — LOW (ref 150–400)
POTASSIUM SERPL-MCNC: 3.6 MMOL/L — SIGNIFICANT CHANGE UP (ref 3.5–5.3)
POTASSIUM SERPL-SCNC: 3.6 MMOL/L — SIGNIFICANT CHANGE UP (ref 3.5–5.3)
RBC # BLD: 2.93 M/UL — LOW (ref 3.8–5.2)
RBC # FLD: 16 % — HIGH (ref 10.3–14.5)
SODIUM SERPL-SCNC: 141 MMOL/L — SIGNIFICANT CHANGE UP (ref 135–145)
WBC # BLD: 4.28 K/UL — SIGNIFICANT CHANGE UP (ref 3.8–10.5)
WBC # FLD AUTO: 4.28 K/UL — SIGNIFICANT CHANGE UP (ref 3.8–10.5)

## 2022-06-23 PROCEDURE — 99232 SBSQ HOSP IP/OBS MODERATE 35: CPT

## 2022-06-23 RX ADMIN — Medication 1: at 06:07

## 2022-06-23 RX ADMIN — PANTOPRAZOLE SODIUM 40 MILLIGRAM(S): 20 TABLET, DELAYED RELEASE ORAL at 06:00

## 2022-06-23 RX ADMIN — PANTOPRAZOLE SODIUM 40 MILLIGRAM(S): 20 TABLET, DELAYED RELEASE ORAL at 17:21

## 2022-06-23 RX ADMIN — Medication 2: at 17:21

## 2022-06-23 RX ADMIN — Medication 1: at 12:01

## 2022-06-23 RX ADMIN — Medication 60 MICROGRAM(S): at 21:49

## 2022-06-23 NOTE — PROGRESS NOTE ADULT - PROBLEM SELECTOR PLAN 2
- had a right hemicolectomy at [facility] on 5/27  - Surgery following and no acute surgical intervention warranted at this time.  - Heme/Oncology Dr. Liu/ dr Sandhu following  - Surgery and GI following.
- had a right hemicolectomy at [facility] on 5/27  - Surgery following and no acute surgical intervention warranted at this time.  - Heme/Oncology Dr. Liu/ dr Sandhu following  - Surgery and GI following.
Pt has h/o colon CA  had a right hemicolectomy at [facility] on 5/27  Surgery team consulted  no acute surgical interventions needed  possible EGD  GI dr. Tapia consulted  Heme/Oncology Dr. Liu/ dr Sandhu consulted.

## 2022-06-23 NOTE — PROGRESS NOTE ADULT - PROBLEM SELECTOR PLAN 4
On admission pt had elevated potassium of 6.5  Was given cocktail and came down to 6.3 >> 5  monitor BMP.
- hold Xarelto due to GI bleeding.  - rate controlled.
- hold Xarelto due to GI bleeding.  - rate controlled.  - Per GI,  EGD in 1 month to re-eval if safe to resume AC.

## 2022-06-23 NOTE — PROGRESS NOTE ADULT - ASSESSMENT
Pt is a 75 yo F from home with PMH of Colon CA (s/p right hemicolectomy on May 27,2022), Afib (on Xarelto with micra transcatheter pacemaker implanted 7/17/2017), CAD (s/p CABG in 2017) , Dementia, HTN, HLD, osteoporosis, T2DM, OA and PSH of left breast total mastectomy who initially presented to the ED with melena from this morning. Pt is a poor historian due to poor cognition. According to daughter, she had two episodes of black stools before admission. She was admitted to [facility] on May 27th for right hemicolectomy for the diagnosis of colon CA. Hem/onc Dr Sandhu evaluated pt for possible outpatient visit to discuss adjuvant chemotherapy candidacy.   Patient admitted to medicine for GIB, s/p 2PRBC with improvement in H&H. Surgery, GI and Hem/onc following.  Colonoscopy not advisable as she underwent recent hemicolectomy. S/p EGD and tolerating clear liquids and therefore advanced to regular diet. Per GI patient not to resume AC at this time due to the size of the duodenal ulcer. Recommended to repeat EGD in 1 month to re-evaluate if safe to resume AC. Patient to continue Protonix BID. Will monitor patient today and if able to tolerate regular diet will be discharged tomorrow.

## 2022-06-23 NOTE — PROGRESS NOTE ADULT - SUBJECTIVE AND OBJECTIVE BOX
NP Note discussed with  Primary Attending    Patient is a 76y old  Female who presents with a chief complaint of GI bleed (23 Jun 2022 11:57)      INTERVAL HPI/OVERNIGHT EVENTS: Epigastric pain and melena this morning.     MEDICATIONS  (STANDING):  insulin lispro (ADMELOG) corrective regimen sliding scale   SubCutaneous every 6 hours  levothyroxine Injectable 60 MICROGram(s) IV Push at bedtime  pantoprazole  Injectable 40 milliGRAM(s) IV Push two times a day  sodium chloride 0.9%. 1000 milliLiter(s) (100 mL/Hr) IV Continuous <Continuous>    MEDICATIONS  (PRN):    __________________________________________________  REVIEW OF SYSTEMS:    CONSTITUTIONAL: No fever,   EYES: no acute visual disturbances  NECK: No pain or stiffness  RESPIRATORY: No cough; No shortness of breath  CARDIOVASCULAR: No chest pain, no palpitations  GASTROINTESTINAL: melena this morning. Denies nausea, vomiting. Endorses epigastric pain (mild).   NEUROLOGICAL: No headache or numbness, no tremors  MUSCULOSKELETAL: No joint pain, no muscle pain  GENITOURINARY: no dysuria, no frequency, no hesitancy  PSYCHIATRY: no depression , no anxiety  ALL OTHER  ROS negative        Vital Signs Last 24 Hrs  T(C): 36.9 (23 Jun 2022 13:19), Max: 37.1 (22 Jun 2022 20:03)  T(F): 98.5 (23 Jun 2022 13:19), Max: 98.7 (22 Jun 2022 20:03)  HR: 66 (23 Jun 2022 13:19) (64 - 68)  BP: 149/66 (23 Jun 2022 13:19) (149/66 - 166/63)  BP(mean): --  RR: 16 (23 Jun 2022 13:19) (16 - 18)  SpO2: 100% (23 Jun 2022 13:19) (95% - 100%)    ________________________________________________  PHYSICAL EXAM:  GENERAL: NAD  HEENT: Normocephalic; conjunctivae and sclerae clear; moist mucous membranes;   NECK : supple  CHEST/LUNG: Clear to auscultation bilaterally with good air entry   HEART: S1 S2  regular; no murmurs, gallops or rubs  ABDOMEN: Soft, Nontender, Nondistended; Bowel sounds present  EXTREMITIES: no cyanosis; no edema; no calf tenderness  SKIN: warm and dry; no rash  NERVOUS SYSTEM:  Awake and alert; Oriented  to place, person and time ; no new deficits    _________________________________________________  LABS:                        9.1    4.28  )-----------( 146      ( 23 Jun 2022 06:34 )             27.1     06-23    141  |  108  |  16  ----------------------------<  166<H>  3.6   |  25  |  1.01    Ca    8.8      23 Jun 2022 06:34    TPro  6.3  /  Alb  3.1<L>  /  TBili  0.3  /  DBili  x   /  AST  19  /  ALT  19  /  AlkPhos  37<L>  06-22        CAPILLARY BLOOD GLUCOSE  POCT Blood Glucose.: 183 mg/dL (23 Jun 2022 11:54)  POCT Blood Glucose.: 164 mg/dL (23 Jun 2022 05:50)  POCT Blood Glucose.: 148 mg/dL (22 Jun 2022 23:48)      RADIOLOGY & ADDITIONAL TESTS:    < from: CT Abdomen and Pelvis w/ IV Cont (06.20.22 @ 16:35) >  ACC: [id number] EXAM:  CT ABDOMEN AND PELVIS IC                          PROCEDURE DATE:  06/20/2022        INTERPRETATION:  CLINICAL INFORMATION: Status post colon resection 7 2020, now with rectal bleeding and diffuse abdominal pain    COMPARISON: 4.14.22    CONTRAST/COMPLICATIONS:  IV Contrast: Omnipaque 350  90 cc administered   10 cc discarded  Oral Contrast: NONE  Complications: None reported at time of study completion    PROCEDURE:  CT of the Abdomen and Pelvis was performed.  Sagittal and coronal reformats were performed.    FINDINGS:  LOWER CHEST: Within normal limits.    LIVER: Within normal limits.  BILE DUCTS: Normal caliber.  GALLBLADDER: Cholelithiasis.  SPLEEN: Within normal limits.  PANCREAS: Within normal limits.  ADRENALS: Within normal limits.  KIDNEYS/URETERS: Bilateral renal hilar calcifications which are likely   renovascular. Right renal scarring. Small left renal cyst.    BLADDER: Within normal limits.  REPRODUCTIVE ORGANS: Uterus and adnexa within normal limits.    BOWEL: No bowel obstruction. Status post right colon resection and   ileocolic anastomosis with adjacent fat stranding compatible with   postoperative change. Small pocket of fluid adjacent to the anastomosis   measuring 2.1 cm. No extraluminalgas.  PERITONEUM: No ascites.  VESSELS: Atherosclerotic changes. Portal, superior mesenteric and splenic   veins are patent.  RETROPERITONEUM/LYMPH NODES: No lymphadenopathy.  ABDOMINAL WALL: Postsurgical changes.  BONES: Moderate L1 compression deformity, unchanged.  Old left superior   pubic ramus fracture. No acute findings.    IMPRESSION:  Status post right colon resection with expected postsurgical changes   adjacent to the ileocolic anastomosis and small (2 cm) fluid collection.   No drainable fluid collection.    --- End of Report ---      ISRAEL HANKINS MD; Attending Radiologist  This document has been electronically signed. Jun 20 2022  4:49PM    < end of copied text >    < from: EGD (06.22.22 @ 15:00) >  Findings:      Esophagus Mucosa Normal mucosa was noted in the whole esophagus.    Stomach Lumen A medium size hiatal hernia was seen. Retroflexion view in the    stomach confirmed the size and morphology of the hernia.    Mucosa Patchy erythema of the mucosa with no bleeding was noted in the antrum.A    cold forceps biopsy was performed for histology in the antrum.    Duodenum Excavated lesions A single cratered non-bleeding 15 mm white base ulcer    was found in the duodenal bulb and first part of the duodenum.      Impressions:    Normal mucosa in the whole esophagus.  Hiatal Hernia.  Erythema in the antrum. (Biopsy).  Ulcer in the duodenal bulb and first part of the duodenum.        < end of copied text >      Imaging Personally Reviewed:  YES    Consultant(s) Notes Reviewed:   YES    Care Discussed with Consultants :     Plan of care was discussed with patient and /or primary care giver; all questions and concerns were addressed and care was aligned with patient's wishes.

## 2022-06-23 NOTE — PROGRESS NOTE ADULT - SUBJECTIVE AND OBJECTIVE BOX
[   ] ICU                                          [   ] CCU                                      [ X  ] Medical Floor    Patient is a 76 year old female with GI bleeding. GI consulted to evaluate.        Pt is a 76 year old female from home with past medical history significant for Colon CA (s/p right hemicolectomy on May 27,2022), Afib (on Xarelto with micra transcatheter pacemaker implanted 7/17/2017), CAD (s/p CABG in 2017) , Dementia, HTN, HLD, osteoporosis, T2DM, OA and PSH of left breast total mastectomy presented to emergency room with melena. Patient is a poor historian due to poor cognition. According to daughter, she had two episodes of black stools today. She also was complaining of abdominal pain. No c/o chest pain, shortness of breath, fever, headache, N/V, urinary complaints.    Patient appears comfortable. No new complaints reported, No abdominal pain, N/V, hematemesis, hematochezia, melena, fever, chills, chest pain, SOB, cough or diarrhea reported.         PAIN MANAGEMENT:  Pain Scale:                 /10  Pain Location:      Prior Colonoscopy:  04/14/2022    PAST MEDICAL HISTORY  Atrial fibrillation and flutter  CAD (coronary artery disease)  Hypertension  DM (diabetes mellitus)  Hypothyroidism  Cataract  Breast cancer  Anemia  Mixed hyperlipidemia  Osteoporosis  Osteopenia  Colon cancer        PAST SURGICAL HISTORY  CABG x 1  Cardiac pacemaker  Cardiac catheterization  Left mastectomy  Right hemicolectomy        Allergies    No Known Allergies    Intolerances  None         MEDICATIONS  (STANDING):  insulin lispro (ADMELOG) corrective regimen sliding scale   SubCutaneous every 6 hours  levothyroxine Injectable 60 MICROGram(s) IV Push at bedtime  pantoprazole  Injectable 40 milliGRAM(s) IV Push two times a day         SOCIAL HISTORY  Advanced Directives:       [ X ] Full Code       [  ] DNR  Marital Status:         [  ] M      [ X ] S      [  ] D       [  ] W  Children:       [X  ] Yes      [  ] No  Occupation:        [  ] Employed       [ X ] Unemployed       [  ] Retired  Diet:       [ X ] Regular       [  ] PEG feeding          [  ] NG tube feeding  Drug Use:           [X  ] Patient denied          [  ] Yes  Alcohol:           [ X ] No             [  ] Yes (socially)         [  ] Yes (chronic)  Tobacco:           [  ] Yes           [ X ] No      FAMILY HISTORY  [ X ] Heart Disease            [ X ] Diabetes             [ X ] HTN             [  ] Colon Cancer             [  ] Stomach Cancer              [  ] Pancreatic Cancer        VITALS  Vital Signs Last 24 Hrs  T(C): 36.9 (23 Jun 2022 13:19), Max: 37.1 (22 Jun 2022 20:03)  T(F): 98.5 (23 Jun 2022 13:19), Max: 98.7 (22 Jun 2022 20:03)  HR: 66 (23 Jun 2022 13:19) (64 - 68)  BP: 149/66 (23 Jun 2022 13:19) (149/66 - 166/63)   RR: 16 (23 Jun 2022 13:19) (16 - 18)  SpO2: 100% (23 Jun 2022 13:19) (95% - 100%)       MEDICATIONS  (STANDING):  insulin lispro (ADMELOG) corrective regimen sliding scale   SubCutaneous every 6 hours  levothyroxine Injectable 60 MICROGram(s) IV Push at bedtime  pantoprazole  Injectable 40 milliGRAM(s) IV Push two times a day  sodium chloride 0.9%. 1000 milliLiter(s) (100 mL/Hr) IV Continuous <Continuous>    MEDICATIONS  (PRN):                            9.1    4.28  )-----------( 146      ( 23 Jun 2022 06:34 )             27.1       06-23    141  |  108  |  16  ----------------------------<  166<H>  3.6   |  25  |  1.01    Ca    8.8      23 Jun 2022 06:34    TPro  6.3  /  Alb  3.1<L>  /  TBili  0.3  /  DBili  x   /  AST  19  /  ALT  19  /  AlkPhos  37<L>  06-22

## 2022-06-23 NOTE — PROGRESS NOTE ADULT - PROBLEM SELECTOR PLAN 1
-NPO for EGD today with Dr. Liao.   -hx of recent right hemicolectomy at [facility] on 5/27.   -Hemoglobin on admission 6.8 and now 9.0 s/p 2 PRBC.   -Continue IV Protonix BID  - GI and Surgery following
Pt presented with two episodes of melena  Vitals stable  Physical exam mild tenderness over right abdomen  s/p right hemicolectomy at [facility] on 5/27  Surgery team consulted  no acute surgical interventions needed  hemoglobin on admission 6.8  transfusing 2U pRBCs  monitor CBC post transfusion  NPO now  started on IV Protonix BID  Gi Dr. Tapia consulted.
-S/p EGD 6/22 with findings of Ulcer in the duodenal bulb and first part of the duodenum; see full report above.   -hx of recent right hemicolectomy at [facility] on 5/27.   -Hemoglobin on admission 6.8 and now 9.0 s/p 2 PRBC.   -Continue IV Protonix BID  - GI and Surgery following

## 2022-06-23 NOTE — PROGRESS NOTE ADULT - SUBJECTIVE AND OBJECTIVE BOX
Patient is a 76y old  Female who presents with a chief complaint of GI bleed (22 Jun 2022 12:54)    pt seen in icu [  ], reg med floor [  x ], bed [ x ], chair at bedside [   ], a+o x3 [ x ], lethargic [  ],  nad [x  ]      Allergies    No Known Allergies        Vitals    T(F): 98.5 (06-23-22 @ 04:58), Max: 98.7 (06-22-22 @ 20:03)  HR: 68 (06-23-22 @ 04:58) (64 - 68)  BP: 164/50 (06-23-22 @ 04:58) (156/64 - 166/63)  RR: 18 (06-23-22 @ 04:58) (18 - 18)  SpO2: 95% (06-23-22 @ 04:58) (95% - 98%)  Wt(kg): --  CAPILLARY BLOOD GLUCOSE      POCT Blood Glucose.: 164 mg/dL (23 Jun 2022 05:50)      Labs                          9.1    4.28  )-----------( 146      ( 23 Jun 2022 06:34 )             27.1       06-23    141  |  108  |  16  ----------------------------<  166<H>  3.6   |  25  |  1.01    Ca    8.8      23 Jun 2022 06:34    TPro  6.3  /  Alb  3.1<L>  /  TBili  0.3  /  DBili  x   /  AST  19  /  ALT  19  /  AlkPhos  37<L>  06-22                Radiology Results      Meds    MEDICATIONS  (STANDING):  insulin lispro (ADMELOG) corrective regimen sliding scale   SubCutaneous every 6 hours  levothyroxine Injectable 60 MICROGram(s) IV Push at bedtime  pantoprazole  Injectable 40 milliGRAM(s) IV Push two times a day  sodium chloride 0.9%. 1000 milliLiter(s) (100 mL/Hr) IV Continuous <Continuous>      MEDICATIONS  (PRN):      Physical Exam    Neuro :  no focal deficits  Respiratory: CTA B/L  CV: RRR, S1S2, no murmurs,   Abdominal: Soft, R&L upper quad tender to moderate palp, ND +BS,  Extremities: No edema, + peripheral pulses      ASSESSMENT    symptomatic anemia,   brbpr 2nd to gi bleed,   r/o anastomotic bleed  h/o Colon CA (s/p right hemicolectomy on May 27,2022),   chronic Afib on Xarelto with micra transcatheter pacemaker implanted 7/17/2017,   CAD (s/p CABG in 2017) ,   Dementia,   HTN,   HLD,   osteoporosis,   T2DM,   OA   Breast cancer s/p left breast total mastectomy  Cataract      PLAN    s/p transfuse 4 units prbc,   hold xarelto,   resume anticoag if cleared by gi  gi f/u   h/h stable s/p transfusion  s/p EGD with Normal mucosa in the whole esophagus. Hiatal Hernia. Erythema in the antrum. (Biopsy). Ulcer in the duodenal bulb and first part of the duodenum.  Await pathology results.  Protonix 40mg PO BID  Avoid NSAID  Treat for H. Pylori if positive  Anti reflux measure  Clear liquid diet  surg onc f/u   If bleeding continues, a CT angiogram may be necessary  heme onc cons noted  pt to follow-up outpatient to discuss adjuvant chemotherapy candidacy.   On discharge, patient to follow-up with Dr. Mook Rollins.  cont current meds.

## 2022-06-23 NOTE — DISCHARGE NOTE PROVIDER - PROVIDER TOKENS
PROVIDER:[TOKEN:[[id number]]],PROVIDER:[TOKEN:[[id number]]],PROVIDER:[TOKEN:[[id number]]],FREE:[LAST:[Giovany],FIRST:[Oscar],PHONE:[phone]],FAX:[(   )    -]]

## 2022-06-23 NOTE — PROGRESS NOTE ADULT - SUBJECTIVE AND OBJECTIVE BOX
Vital Signs Last 24 Hrs  T(C): 36.9 (23 Jun 2022 04:58), Max: 37.1 (22 Jun 2022 20:03)  T(F): 98.5 (23 Jun 2022 04:58), Max: 98.7 (22 Jun 2022 20:03)  HR: 68 (23 Jun 2022 04:58) (64 - 68)  BP: 164/50 (23 Jun 2022 04:58) (156/64 - 166/63)  BP(mean): --  RR: 18 (23 Jun 2022 04:58) (18 - 18)  SpO2: 95% (23 Jun 2022 04:58) (95% - 98%)    I&O's Detail                            9.1    4.28  )-----------( 146      ( 23 Jun 2022 06:34 )             27.1       06-23    141  |  108  |  16  ----------------------------<  166<H>  3.6   |  25  |  1.01    Ca    8.8      23 Jun 2022 06:34    TPro  6.3  /  Alb  3.1<L>  /  TBili  0.3  /  DBili  x   /  AST  19  /  ALT  19  /  AlkPhos  37<L>  06-22    EGD results noted  H/H stable  No further bleeding          PLAN:  Regular diet  discharge home

## 2022-06-23 NOTE — PROGRESS NOTE ADULT - ASSESSMENT
76F s/p laparoscopic-assisted extended right hemicolectomy 5/27/22 for T3N1 adenocarcinoma, presented with anemia and GI bleed, EGD with 1.5cm ulcer in D1.     anemia likely from duodenal ulcer given clinical picture  additionally bleeding from surgery most commonly within 10 days postop, and less likely this far out (just shy of 4 weeks)  hgb stabilized  CTA A/P not recommended at this time; may revisit this if patient continues to bleed and more likely source not identifiable  would prefer to hold off on colonoscopy at this time until at least 6 weeks postop 76F s/p laparoscopic-assisted extended right hemicolectomy 5/27/22 for T3N1 adenocarcinoma, presented with anemia and GI bleed, EGD with 1.5cm ulcer in D1.     anemia likely from duodenal ulcer given clinical picture  additionally bleeding from surgery most commonly within 10 days postop, and less likely this far out (just shy of 4 weeks)  hgb stabilized  advance diet as tolerated from surgery standpoint  CTA A/P not recommended at this time; may revisit this if patient continues to bleed and more likely source not identifiable  would prefer to hold off on colonoscopy at this time until at least 6 weeks postop

## 2022-06-23 NOTE — PROGRESS NOTE ADULT - ASSESSMENT
MALISSA CHAPARRO is a 76y Female who presents with a chief complaint of GI bleed    Anemia  - Patient with severe anemia on presentation and reports of melena.  - Hold anticoagulation.  - Surgery and gastroenterology consult appreciated  - EGD yesterday showed erythema in the antrum, bx, and ulcer in the duodenal bulb and first part of the duodenum  - f/u path from EGD  - Monitor CBC and transfuse. Hgb overall stable  - Will follow-up on iron panel as outpatient.   - Noted CT abdomen/pelvis findings.     Colon Cancer  - Patient with recently resected T3N1 colon cancer in late May 2022.  - Will need to follow-up outpatient to discuss adjuvant chemotherapy candidacy.     On discharge, patient to follow-up with Dr. Mook Rollins.    Will continue to follow. D/w pt.

## 2022-06-23 NOTE — DISCHARGE NOTE PROVIDER - REASON FOR ADMISSION
GI bleed
bilateral upper extremity Active ROM was WFL (within functional limits)/bilateral  lower extremity Active ROM was WFL (within functional limits)

## 2022-06-23 NOTE — PROGRESS NOTE ADULT - PROBLEM SELECTOR PLAN 6
Pt has h/o type 2 DM   Pt is NPO  started on insulin sliding scale Q6h.
-  synthroid 75mcg at home.   - Continue Synthroid 60 mcg IV while NPO.
-  synthroid 75mcg

## 2022-06-23 NOTE — DISCHARGE NOTE PROVIDER - NSDCCPCAREPLAN_GEN_ALL_CORE_FT
PRINCIPAL DISCHARGE DIAGNOSIS  Diagnosis: Bloody stools  Assessment and Plan of Treatment: You were admitted with bloody stool and your EGD showed a duodenal ulcer as the cause for your bleeding.  Report any symptoms of black or bloody stool, abdominal pain with nausea or vomiting, passing out or syncope with heart palpitation, dizziness or lightheadedness.      SECONDARY DISCHARGE DIAGNOSES  Diagnosis: Colon cancer  Assessment and Plan of Treatment: Continue to follow up with your oncologist as outpatient for your ongoing treatment and report any new symptoms of abdominal pain with bloody stool, inability tolerating oral intake, fever or chills, temperature of 101.0F    Diagnosis: Hypothyroidism  Assessment and Plan of Treatment: Continue taking your medication for your thyroid and follow up with your doctor for routine blood work.    Diagnosis: Chronic atrial fibrillation  Assessment and Plan of Treatment: Follow up with your oncologist and  your doctor to decide when to resume your blood thinners.  Atrial fibrillation is the most common heart rhythm abnormality and increases your risk of stroke and heart attack.    It helps if you control your blood pressure, do not drink more than 1-2 alcohol drinks per day, cut down on caffeine, getting treatment for over active thyroid gland and getting exercise.  Call your doctor if you feel your heart racing or beating unusually, chest tightness or pain, lightheaded or dizziness, faint, shortness of breath.   It is important to take your heart medication as prescribed  You may be on anticoagulation which is very important to take as directed - you may need blood work to monitor drug levels for therapeutic range.      Diagnosis: Hyperkalemia  Assessment and Plan of Treatment: You potassium level was elevated to a dangerous level.  Continue taking your prescribed medication as instructed  prescribed.     PRINCIPAL DISCHARGE DIAGNOSIS  Diagnosis: GI bleeding  Assessment and Plan of Treatment: You were admitted with bloody stool and your EGD showed a duodenal ulcer as the cause for your bleeding.   Please stop taking Xarelto which can cause ulcer to bleed, your blood count was monitored closely and it remained stable    follow up with GI specialist in 1 month to repeat EGD and to evaluate if blood thinner can be started again   follow up with PCP within 1 week   Follow up with GI specialist within 2 weeeks  Report any symptoms of black or bloody stool, abdominal pain with nausea or vomiting, passing out or syncope with heart palpitation, dizziness or lightheadedness.      SECONDARY DISCHARGE DIAGNOSES  Diagnosis: Hyperkalemia  Assessment and Plan of Treatment: You potassium level was elevated to a dangerous level.  Continue taking your prescribed medication as instructed  prescribed.    Diagnosis: Colon cancer  Assessment and Plan of Treatment: Continue to follow up with your oncologist as outpatient for your ongoing treatment and report any new symptoms of abdominal pain with bloody stool, inability tolerating oral intake, fever or chills, temperature of 101.0F    Diagnosis: Chronic atrial fibrillation  Assessment and Plan of Treatment: Please stop taking Xarelto at  this time due to stomach ulcer   Follow up with your oncologist and  your doctor to decide when to resume your blood thinners.  Atrial fibrillation is the most common heart rhythm abnormality and increases your risk of stroke and heart attack.    It helps if you control your blood pressure, do not drink more than 1-2 alcohol drinks per day, cut down on caffeine, getting treatment for over active thyroid gland and getting exercise.  Call your doctor if you feel your heart racing or beating unusually, chest tightness or pain, lightheaded or dizziness, faint, shortness of breath.   It is important to take your heart medication as prescribed  therapeutic range.      Diagnosis: Hypothyroidism  Assessment and Plan of Treatment: Continue taking your medication for your thyroid and follow up with your doctor for routine blood work.

## 2022-06-23 NOTE — DISCHARGE NOTE PROVIDER - CARE PROVIDER_API CALL
Alejandro Tapia)  Medicine  [address] [facility], [address] [place] [address]  Phone: [phone]  Fax: [phone]  Follow Up Time:     Bryanna Suggs  HEMATOLOGY  [address]  [place] [address]  Phone: [phone]  Fax: [phone]  Follow Up Time:     Oscar Adair  INTERNAL MEDICINE  [address] [place] [address]  Phone: [phone]  Fax: [phone]  Follow Up Time:     Oscar Adair  Phone: [phone]  Fax: (   )    -  Follow Up Time:

## 2022-06-23 NOTE — PHYSICAL THERAPY INITIAL EVALUATION ADULT - GENERAL OBSERVATIONS, REHAB EVAL
Consult received, EMR, radiology and labs reviewed. Patient received OOB in a chair, Colombian speaking Int ID [id number]. Patient agreed to EVALUATION from Physical Therapist.

## 2022-06-23 NOTE — PROGRESS NOTE ADULT - PROBLEM SELECTOR PLAN 7
- patient diet upgraded to regular and will monitor if patient is able to tolerate it prior to discharge, likely tomorrow.   - Patient to f/u with Dr. Liao in 1 week and repeat EGD in 1 month to re-eval if safe to resume AC.
Pt has h/o hypothyroidism  at home on synthroid 75mcg PO  started on IV levothyroxine 60mcg.
- Pending EGD and PT eval.   - Plan to DC home when medically optimized.

## 2022-06-23 NOTE — PROGRESS NOTE ADULT - PROBLEM SELECTOR PLAN 3
- Likely acute blood loss (GI bleed).   - Iron studies wnl (04/2022).   - Hgb 6.8>>9.0 s/p 2 PRBC since admission.   - Asymptomatic  - CBC in AM
- Likely acute blood loss (GI bleed).   - Iron studies wnl (04/2022).   - H&H stable.   - CBC in AM
Pt has h/o iron deficiency anemia  hemoglobin on admission 6.8  s/p 2U pRBCs  monitor CBC post transfusion.  transfuse another unit as per surgery  Hem/onc dr Sandhu consulted

## 2022-06-23 NOTE — PROGRESS NOTE ADULT - SUBJECTIVE AND OBJECTIVE BOX
76F s/p laparoscopic-assisted extended right hemicolectomy 5/27/22 for T3N1 adenocarcinoma, presented with anemia and GI bleed. patient received 2 units pRBC and her hgb has stabilized. EGD showed 1.5cm ulcer in D1, antral biopsies taken.     no issues overnight. tolerating clears. acknowledges epigastric pain. no nausea or vomiting. continues to have dark stools but no bright blood.     Vital Signs Last 24 Hrs  T(C): 36.9 (23 Jun 2022 04:58), Max: 37.1 (22 Jun 2022 20:03)  T(F): 98.5 (23 Jun 2022 04:58), Max: 98.7 (22 Jun 2022 20:03)  HR: 68 (23 Jun 2022 04:58) (64 - 68)  BP: 164/50 (23 Jun 2022 04:58) (156/64 - 166/63)  BP(mean): --  RR: 18 (23 Jun 2022 04:58) (18 - 18)  SpO2: 95% (23 Jun 2022 04:58) (95% - 98%)    NAD, awake and alert, converses appropriately, GCS15  unlabored breathing on room air, no accessory muscle use  abdomen soft, nontender, nondistended  moves all extremities  skin warm and well perfused                           9.1    4.28  )-----------( 146      ( 23 Jun 2022 06:34 )             27.1   06-23    141  |  108  |  16  ----------------------------<  166<H>  3.6   |  25  |  1.01    Ca    8.8      23 Jun 2022 06:34    TPro  6.3  /  Alb  3.1<L>  /  TBili  0.3  /  DBili  x   /  AST  19  /  ALT  19  /  AlkPhos  37<L>  06-22

## 2022-06-23 NOTE — PROGRESS NOTE ADULT - PROBLEM SELECTOR PLAN 8
- DVT ppx: SCDs; Xarelto on hold 2/2 GI bleed. SCDs.   - GI ppx: PPI BID.
SCD for DVT ppx   no chemical ppx due to active GIB
- DVT ppx: SCDs; Xarelto on hold 2/2 GI bleed.   - GI ppx: PPI

## 2022-06-23 NOTE — DISCHARGE NOTE PROVIDER - NSDCMRMEDTOKEN_GEN_ALL_CORE_FT
acetaminophen 650 mg oral tablet, extended release: 2 tab(s) orally every 8 hours  ALENDRONATE SODIUM 70MG TAB: tab(s) orally once a week  RAH LEVOTHYROXINE SODIUM 75MCG TAB: tab(s) orally once a day  atorvastatin 40 mg oral tablet: 1 tab(s) orally once a day (at bedtime)  docusate sodium 100 mg oral tablet: 1 tab(s) orally 3 times a day  donepezil 10 mg oral tablet: 1 tab(s) orally once a day (at bedtime)  ferrous sulfate 325 mg (65 mg elemental iron) oral tablet: 1 tab(s) orally 3 times a day  GABAPENTIN 100MG CAP: cap(s) orally 3 times a day  Home physical therapy - 3 times a week :   HYDRALAZINE HYDROCHLORIDE 25MG TAB: tab(s) orally once a day  JANUVIA 100MG TAB: tab(s) orally once a day  LEVEMIR FLEXTOUCH 100U/ML PEN: 35units   loratadine 10 mg oral tablet: 1 tab(s) orally once a day  LOSARTAN POTASSIUM 50MG TAB: tab(s) orally once a day  METFORMIN HYDROCHLORIDE 1000MG TAB: 1 tab(s) orally once a day   METOPROLOL SUCCINATE ER 100MG ER TAB: tab(s) orally once a day  oxyCODONE 5 mg oral tablet: 1 tab(s) orally every 6 hours, As Needed MDD:max 4 tabs a day   Tab-A-Giselle oral tablet: 1 tab(s) orally once a day  Vitamin D2 1.25 mg (50,000 intl units) oral capsule: 1 cap(s) orally once a week  XARELTO 20MG TAB: 1 tab(s) orally once a day    acetaminophen 650 mg oral tablet, extended release: 2 tab(s) orally every 8 hours  ALENDRONATE SODIUM 70MG TAB: tab(s) orally once a week  RAH LEVOTHYROXINE SODIUM 75MCG TAB: tab(s) orally once a day  aspirin 81 mg oral delayed release tablet: 1 tab(s) orally once a day  atorvastatin 40 mg oral tablet: 1 tab(s) orally once a day (at bedtime)  docusate sodium 100 mg oral tablet: 1 tab(s) orally 3 times a day  donepezil 10 mg oral tablet: 1 tab(s) orally once a day (at bedtime)  ferrous sulfate 325 mg (65 mg elemental iron) oral tablet: 1 tab(s) orally 3 times a day  GABAPENTIN 100MG CAP: cap(s) orally 3 times a day  Home physical therapy - 3 times a week :   HYDRALAZINE HYDROCHLORIDE 25MG TAB: tab(s) orally once a day  JANUVIA 100MG TAB: tab(s) orally once a day  LEVEMIR FLEXTOUCH 100U/ML PEN: 35units   loratadine 10 mg oral tablet: 1 tab(s) orally once a day  LOSARTAN POTASSIUM 50MG TAB: tab(s) orally once a day  METFORMIN HYDROCHLORIDE 1000MG TAB: 1 tab(s) orally once a day   METOPROLOL SUCCINATE ER 100MG ER TAB: tab(s) orally once a day  oxyCODONE 5 mg oral tablet: 1 tab(s) orally every 6 hours, As Needed MDD:max 4 tabs a day   pantoprazole 40 mg oral delayed release tablet: 1 tab(s) orally every 12 hours  Tab-A-Giselle oral tablet: 1 tab(s) orally once a day  Vitamin D2 1.25 mg (50,000 intl units) oral capsule: 1 cap(s) orally once a week

## 2022-06-23 NOTE — PROGRESS NOTE ADULT - ASSESSMENT
1. Anemia  2. Melena  3. S/p EGD  4. Large duodenal ulcer  5. Gastritis    Suggestions:    1. Monitor H/H  2. Transfuse PRBC as needed  3. Protonix 40mg BID  4. Avoid NSAID  5. Follow up path  6. High risk of bleeding with anticoagulation  7. Diet as tolerated  8. DVT prophylaxis

## 2022-06-23 NOTE — DISCHARGE NOTE PROVIDER - NSDCFUSCHEDAPPT_GEN_ALL_CORE_FT
Catrachito Zabala  [facility]  SURGONC [address] [facility]  Scheduled Appointment: 06/29/2022

## 2022-06-23 NOTE — PROGRESS NOTE ADULT - SUBJECTIVE AND OBJECTIVE BOX
Pt seen, feeling better, still with minimal bleeding though, improved    MEDICATIONS  (STANDING):  insulin lispro (ADMELOG) corrective regimen sliding scale   SubCutaneous every 6 hours  levothyroxine Injectable 60 MICROGram(s) IV Push at bedtime  pantoprazole  Injectable 40 milliGRAM(s) IV Push two times a day  sodium chloride 0.9%. 1000 milliLiter(s) (100 mL/Hr) IV Continuous <Continuous>    MEDICATIONS  (PRN):      ROS  no pain, as above, limited 2/2 participation    Vital Signs Last 24 Hrs  T(C): 36.9 (23 Jun 2022 04:58), Max: 37.1 (22 Jun 2022 20:03)  T(F): 98.5 (23 Jun 2022 04:58), Max: 98.7 (22 Jun 2022 20:03)  HR: 68 (23 Jun 2022 04:58) (64 - 68)  BP: 164/50 (23 Jun 2022 04:58) (156/64 - 166/63)  BP(mean): --  RR: 18 (23 Jun 2022 04:58) (18 - 18)  SpO2: 95% (23 Jun 2022 04:58) (95% - 98%)    PE  NAD  Awake, alert  Anicteric  limited 2/2 covid pandemic                          9.1    4.28  )-----------( 146      ( 23 Jun 2022 06:34 )             27.1       06-23    141  |  108  |  16  ----------------------------<  166<H>  3.6   |  25  |  1.01    Ca    8.8      23 Jun 2022 06:34    TPro  6.3  /  Alb  3.1<L>  /  TBili  0.3  /  DBili  x   /  AST  19  /  ALT  19  /  AlkPhos  37<L>  06-22

## 2022-06-24 ENCOUNTER — TRANSCRIPTION ENCOUNTER (OUTPATIENT)
Age: 77
End: 2022-06-24

## 2022-06-24 VITALS — DIASTOLIC BLOOD PRESSURE: 67 MMHG | SYSTOLIC BLOOD PRESSURE: 117 MMHG | HEART RATE: 67 BPM

## 2022-06-24 PROBLEM — C18.9 MALIGNANT NEOPLASM OF COLON, UNSPECIFIED: Chronic | Status: ACTIVE | Noted: 2022-06-20

## 2022-06-24 LAB
ALBUMIN SERPL ELPH-MCNC: 3.2 G/DL — LOW (ref 3.5–5)
ALP SERPL-CCNC: 40 U/L — SIGNIFICANT CHANGE UP (ref 40–120)
ALT FLD-CCNC: 20 U/L DA — SIGNIFICANT CHANGE UP (ref 10–60)
ANION GAP SERPL CALC-SCNC: 8 MMOL/L — SIGNIFICANT CHANGE UP (ref 5–17)
AST SERPL-CCNC: 14 U/L — SIGNIFICANT CHANGE UP (ref 10–40)
BILIRUB SERPL-MCNC: 0.2 MG/DL — SIGNIFICANT CHANGE UP (ref 0.2–1.2)
BUN SERPL-MCNC: 11 MG/DL — SIGNIFICANT CHANGE UP (ref 7–18)
CALCIUM SERPL-MCNC: 8.9 MG/DL — SIGNIFICANT CHANGE UP (ref 8.4–10.5)
CHLORIDE SERPL-SCNC: 108 MMOL/L — SIGNIFICANT CHANGE UP (ref 96–108)
CO2 SERPL-SCNC: 26 MMOL/L — SIGNIFICANT CHANGE UP (ref 22–31)
CREAT SERPL-MCNC: 1.12 MG/DL — SIGNIFICANT CHANGE UP (ref 0.5–1.3)
EGFR: 51 ML/MIN/1.73M2 — LOW
GLUCOSE BLDC GLUCOMTR-MCNC: 167 MG/DL — HIGH (ref 70–99)
GLUCOSE BLDC GLUCOMTR-MCNC: 194 MG/DL — HIGH (ref 70–99)
GLUCOSE BLDC GLUCOMTR-MCNC: 203 MG/DL — HIGH (ref 70–99)
GLUCOSE SERPL-MCNC: 169 MG/DL — HIGH (ref 70–99)
HCT VFR BLD CALC: 27.6 % — LOW (ref 34.5–45)
HGB BLD-MCNC: 9.5 G/DL — LOW (ref 11.5–15.5)
MCHC RBC-ENTMCNC: 31.1 PG — SIGNIFICANT CHANGE UP (ref 27–34)
MCHC RBC-ENTMCNC: 34.4 GM/DL — SIGNIFICANT CHANGE UP (ref 32–36)
MCV RBC AUTO: 90.5 FL — SIGNIFICANT CHANGE UP (ref 80–100)
NRBC # BLD: 0 /100 WBCS — SIGNIFICANT CHANGE UP (ref 0–0)
PLATELET # BLD AUTO: 170 K/UL — SIGNIFICANT CHANGE UP (ref 150–400)
POTASSIUM SERPL-MCNC: 3.5 MMOL/L — SIGNIFICANT CHANGE UP (ref 3.5–5.3)
POTASSIUM SERPL-SCNC: 3.5 MMOL/L — SIGNIFICANT CHANGE UP (ref 3.5–5.3)
PROT SERPL-MCNC: 6.6 G/DL — SIGNIFICANT CHANGE UP (ref 6–8.3)
RBC # BLD: 3.05 M/UL — LOW (ref 3.8–5.2)
RBC # FLD: 16 % — HIGH (ref 10.3–14.5)
SODIUM SERPL-SCNC: 142 MMOL/L — SIGNIFICANT CHANGE UP (ref 135–145)
SURGICAL PATHOLOGY STUDY: SIGNIFICANT CHANGE UP
WBC # BLD: 3.72 K/UL — LOW (ref 3.8–10.5)
WBC # FLD AUTO: 3.72 K/UL — LOW (ref 3.8–10.5)

## 2022-06-24 PROCEDURE — 87635 SARS-COV-2 COVID-19 AMP PRB: CPT

## 2022-06-24 PROCEDURE — 88312 SPECIAL STAINS GROUP 1: CPT

## 2022-06-24 PROCEDURE — 85730 THROMBOPLASTIN TIME PARTIAL: CPT

## 2022-06-24 PROCEDURE — P9040: CPT

## 2022-06-24 PROCEDURE — 84100 ASSAY OF PHOSPHORUS: CPT

## 2022-06-24 PROCEDURE — 36415 COLL VENOUS BLD VENIPUNCTURE: CPT

## 2022-06-24 PROCEDURE — 86900 BLOOD TYPING SEROLOGIC ABO: CPT

## 2022-06-24 PROCEDURE — 96375 TX/PRO/DX INJ NEW DRUG ADDON: CPT

## 2022-06-24 PROCEDURE — 85025 COMPLETE CBC W/AUTO DIFF WBC: CPT

## 2022-06-24 PROCEDURE — 86850 RBC ANTIBODY SCREEN: CPT

## 2022-06-24 PROCEDURE — 36430 TRANSFUSION BLD/BLD COMPNT: CPT

## 2022-06-24 PROCEDURE — 80048 BASIC METABOLIC PNL TOTAL CA: CPT

## 2022-06-24 PROCEDURE — 88305 TISSUE EXAM BY PATHOLOGIST: CPT

## 2022-06-24 PROCEDURE — 96374 THER/PROPH/DIAG INJ IV PUSH: CPT

## 2022-06-24 PROCEDURE — 99232 SBSQ HOSP IP/OBS MODERATE 35: CPT

## 2022-06-24 PROCEDURE — 83036 HEMOGLOBIN GLYCOSYLATED A1C: CPT

## 2022-06-24 PROCEDURE — 86923 COMPATIBILITY TEST ELECTRIC: CPT

## 2022-06-24 PROCEDURE — G1004: CPT

## 2022-06-24 PROCEDURE — 74177 CT ABD & PELVIS W/CONTRAST: CPT | Mod: MG

## 2022-06-24 PROCEDURE — 83735 ASSAY OF MAGNESIUM: CPT

## 2022-06-24 PROCEDURE — 85610 PROTHROMBIN TIME: CPT

## 2022-06-24 PROCEDURE — 80053 COMPREHEN METABOLIC PANEL: CPT

## 2022-06-24 PROCEDURE — 82962 GLUCOSE BLOOD TEST: CPT

## 2022-06-24 PROCEDURE — 83690 ASSAY OF LIPASE: CPT

## 2022-06-24 PROCEDURE — 85027 COMPLETE CBC AUTOMATED: CPT

## 2022-06-24 PROCEDURE — 99285 EMERGENCY DEPT VISIT HI MDM: CPT

## 2022-06-24 PROCEDURE — 97163 PT EVAL HIGH COMPLEX 45 MIN: CPT

## 2022-06-24 PROCEDURE — 86901 BLOOD TYPING SEROLOGIC RH(D): CPT

## 2022-06-24 RX ORDER — ASPIRIN/CALCIUM CARB/MAGNESIUM 324 MG
81 TABLET ORAL DAILY
Refills: 0 | Status: DISCONTINUED | OUTPATIENT
Start: 2022-06-24 | End: 2022-06-24

## 2022-06-24 RX ORDER — PANTOPRAZOLE SODIUM 20 MG/1
40 TABLET, DELAYED RELEASE ORAL EVERY 12 HOURS
Refills: 0 | Status: DISCONTINUED | OUTPATIENT
Start: 2022-06-24 | End: 2022-06-24

## 2022-06-24 RX ORDER — ASPIRIN/CALCIUM CARB/MAGNESIUM 324 MG
1 TABLET ORAL
Qty: 0 | Refills: 0 | DISCHARGE
Start: 2022-06-24

## 2022-06-24 RX ORDER — PANTOPRAZOLE SODIUM 20 MG/1
1 TABLET, DELAYED RELEASE ORAL
Qty: 60 | Refills: 0
Start: 2022-06-24 | End: 2022-07-23

## 2022-06-24 RX ORDER — HYDRALAZINE HCL 50 MG
25 TABLET ORAL DAILY
Refills: 0 | Status: DISCONTINUED | OUTPATIENT
Start: 2022-06-24 | End: 2022-06-24

## 2022-06-24 RX ORDER — METOPROLOL TARTRATE 50 MG
100 TABLET ORAL DAILY
Refills: 0 | Status: DISCONTINUED | OUTPATIENT
Start: 2022-06-24 | End: 2022-06-24

## 2022-06-24 RX ORDER — DONEPEZIL HYDROCHLORIDE 10 MG/1
10 TABLET, FILM COATED ORAL AT BEDTIME
Refills: 0 | Status: DISCONTINUED | OUTPATIENT
Start: 2022-06-24 | End: 2022-06-24

## 2022-06-24 RX ORDER — LOSARTAN POTASSIUM 100 MG/1
50 TABLET, FILM COATED ORAL DAILY
Refills: 0 | Status: DISCONTINUED | OUTPATIENT
Start: 2022-06-24 | End: 2022-06-24

## 2022-06-24 RX ORDER — LEVOTHYROXINE SODIUM 125 MCG
75 TABLET ORAL DAILY
Refills: 0 | Status: DISCONTINUED | OUTPATIENT
Start: 2022-06-24 | End: 2022-06-24

## 2022-06-24 RX ADMIN — Medication 25 MILLIGRAM(S): at 13:10

## 2022-06-24 RX ADMIN — PANTOPRAZOLE SODIUM 40 MILLIGRAM(S): 20 TABLET, DELAYED RELEASE ORAL at 17:12

## 2022-06-24 RX ADMIN — Medication 81 MILLIGRAM(S): at 11:54

## 2022-06-24 RX ADMIN — Medication 75 MICROGRAM(S): at 17:12

## 2022-06-24 RX ADMIN — Medication 1: at 17:12

## 2022-06-24 RX ADMIN — Medication 1: at 00:07

## 2022-06-24 RX ADMIN — LOSARTAN POTASSIUM 50 MILLIGRAM(S): 100 TABLET, FILM COATED ORAL at 07:18

## 2022-06-24 RX ADMIN — Medication 100 MILLIGRAM(S): at 08:26

## 2022-06-24 RX ADMIN — Medication 2: at 11:54

## 2022-06-24 RX ADMIN — Medication 1: at 07:06

## 2022-06-24 NOTE — PROGRESS NOTE ADULT - PROVIDER SPECIALTY LIST ADULT
Gastroenterology
Internal Medicine
Surgery
Heme/Onc
Heme/Onc
Surgery
Surgery
Gastroenterology
Internal Medicine

## 2022-06-24 NOTE — PROGRESS NOTE ADULT - SUBJECTIVE AND OBJECTIVE BOX
HPI:  Pt is a 77 yo F from home with PMH of Colon CA (s/p right hemicolectomy on May 27,2022), Afib (on Xarelto with micra transcatheter pacemaker implanted 7/17/2017), CAD (s/p CABG in 2017) , Dementia, HTN, HLD, osteoporosis, T2DM, OA and PSH of left breast total mastectomy presents to the ED with lidia from this morning. Pt is a poor historian due to poor cognition. According to daughter, she had two episodes of black stools today. She also was complaining of abdominal pain. No c/o chest pain, shortness of breath, fever, headache, N/V, urinary complaints.  She was admitted to [facility] on May 27th for right hemicolectomy for the diagnosis of colon CA.  (20 Jun 2022 22:57)     Pt is seen and examined  pt is awake and lying in bed/out of bed to chair  pt seems comfortable and denies any complaints at this time    ROS:  Negative except for:    MEDICATIONS  (STANDING):  aspirin enteric coated 81 milliGRAM(s) Oral daily  donepezil 10 milliGRAM(s) Oral at bedtime  insulin lispro (ADMELOG) corrective regimen sliding scale   SubCutaneous every 6 hours  levothyroxine Injectable 60 MICROGram(s) IV Push at bedtime  losartan 50 milliGRAM(s) Oral daily  metoprolol succinate  milliGRAM(s) Oral daily  pantoprazole    Tablet 40 milliGRAM(s) Oral every 12 hours  sodium chloride 0.9%. 1000 milliLiter(s) (100 mL/Hr) IV Continuous <Continuous>    MEDICATIONS  (PRN):      Allergies    No Known Allergies    Intolerances        Vital Signs Last 24 Hrs  T(C): 37 (24 Jun 2022 05:36), Max: 37.3 (23 Jun 2022 21:20)  T(F): 98.6 (24 Jun 2022 05:36), Max: 99.2 (23 Jun 2022 21:20)  HR: 94 (24 Jun 2022 05:36) (66 - 94)  BP: 187/45 (24 Jun 2022 05:36) (149/66 - 189/55)  BP(mean): --  RR: 17 (24 Jun 2022 05:36) (16 - 18)  SpO2: 99% (24 Jun 2022 05:36) (99% - 100%)    PHYSICAL EXAM  General: adult in NAD  HEENT: clear oropharynx, anicteric sclera, pink conjunctiva  Neck: supple  CV: normal S1/S2 with no murmur rubs or gallops  Lungs: positive air movement b/l ant lungs,clear to auscultation, no wheezes, no rales  Abdomen: soft non-tender non-distended, no hepatosplenomegaly  Ext: no clubbing cyanosis or edema  Skin: no rashes and no petechiae  Neuro: alert and oriented X 4, no focal deficits  LABS:                          9.5    3.72  )-----------( 170      ( 24 Jun 2022 07:04 )             27.6         Mean Cell Volume : 90.5 fl  Mean Cell Hemoglobin : 31.1 pg  Mean Cell Hemoglobin Concentration : 34.4 gm/dL  Auto Neutrophil # : x  Auto Lymphocyte # : x  Auto Monocyte # : x  Auto Eosinophil # : x  Auto Basophil # : x  Auto Neutrophil % : x  Auto Lymphocyte % : x  Auto Monocyte % : x  Auto Eosinophil % : x  Auto Basophil % : x    Serial CBC  Hematocrit 27.6  Hemoglobin 9.5  Plat 170  RBC 3.05  WBC 3.72  Serial CBC  Hematocrit 27.1  Hemoglobin 9.1  Plat 146  RBC 2.93  WBC 4.28  Serial CBC  Hematocrit 26.6  Hemoglobin 9.0  Plat 169  RBC 2.91  WBC 5.00  Serial CBC  Hematocrit 25.3  Hemoglobin 8.6  Plat 162  RBC 2.80  WBC 6.18  Serial CBC  Hematocrit 23.8  Hemoglobin 7.6  Plat 174  RBC 2.52  WBC 5.75  Serial CBC  Hematocrit 24.5  Hemoglobin 8.1  Plat 197  RBC 2.70  WBC 6.37  Serial CBC  Hematocrit 20.7  Hemoglobin 6.8  Plat 213  RBC 2.22  WBC 5.79  Serial CBC  Hematocrit 27.2  Hemoglobin 8.7  Plat 263  RBC 2.83  WBC 6.70    06-24    142  |  108  |  11  ----------------------------<  169<H>  3.5   |  26  |  1.12    Ca    8.9      24 Jun 2022 07:04    TPro  6.6  /  Alb  3.2<L>  /  TBili  0.2  /  DBili  x   /  AST  14  /  ALT  20  /  AlkPhos  40  06-24                    BLOOD SMEAR INTERPRETATION:       RADIOLOGY & ADDITIONAL STUDIES:

## 2022-06-24 NOTE — PROGRESS NOTE ADULT - SUBJECTIVE AND OBJECTIVE BOX
Vital Signs Last 24 Hrs  T(C): 37 (24 Jun 2022 05:36), Max: 37.3 (23 Jun 2022 21:20)  T(F): 98.6 (24 Jun 2022 05:36), Max: 99.2 (23 Jun 2022 21:20)  HR: 52 (24 Jun 2022 13:48) (52 - 94)  BP: 156/73 (24 Jun 2022 13:48) (156/73 - 189/55)  BP(mean): --  RR: 16 (24 Jun 2022 13:48) (16 - 18)  SpO2: 99% (24 Jun 2022 13:48) (99% - 99%)    I&O's Detail                            9.5    3.72  )-----------( 170      ( 24 Jun 2022 07:04 )             27.6       06-24    142  |  108  |  11  ----------------------------<  169<H>  3.5   |  26  |  1.12    Ca    8.9      24 Jun 2022 07:04    TPro  6.6  /  Alb  3.2<L>  /  TBili  0.2  /  DBili  x   /  AST  14  /  ALT  20  /  AlkPhos  40  06-24    no further hematochezia          PLAN:  discharge home today  RTO one week to discus adjuvant chemotherapy

## 2022-06-24 NOTE — PROGRESS NOTE ADULT - REASON FOR ADMISSION
GI bleed

## 2022-06-24 NOTE — PROGRESS NOTE ADULT - ASSESSMENT
· Assessment	  MALISSA CHAPARRO is a 76y Female who presents with a chief complaint of GI bleed    Anemia  - Patient with severe anemia on presentation and reports of melena.  no more bleeding now.  - Hold anticoagulation. can resume in a few days  - Surgery and gastroenterology consult appreciated  - EGD yesterday showed erythema in the antrum, bx, and ulcer in the duodenal bulb and first part of the duodenum  - f/u path from EGD  - Monitor CBC and transfuse. Hgb overall stable  - Will follow-up on iron panel as outpatient.   - Noted CT abdomen/pelvis findings.     Colon Cancer  - Patient with recently resected T3N1 colon cancer in late May 2022.  - Will need to follow-up outpatient to discuss adjuvant chemotherapy candidacy.     On discharge, patient to follow-up with Dr. Mook Rollins.    Will continue to follow. D/w pt.

## 2022-06-24 NOTE — PROGRESS NOTE ADULT - GASTROINTESTINAL
soft/nontender/nondistended/no guarding/no rigidity/no palpable ian/no masses palpable
soft/nontender/nondistended/no guarding/no rigidity/no palpable ian/no masses palpable

## 2022-06-24 NOTE — CHART NOTE - NSCHARTNOTEFT_GEN_A_CORE
Spoke to pt via video  Savita  ID #[id number].  Pt for discharge today, hospital course, treatment plan/options, medication changes/outpt follow up including  keeping Xarelto on hold and following up with GI to repeat EGD in month, following up with Heme for further recs were also reviewed with the pt with good understanding. All questions answered.    Discharge plan discussed with Dr. Estes Spoke to pt via video  Savita  ID #[id number] and with pt's daughter Marni Babin   Pt for discharge today, hospital course, treatment plan/options, medication changes/outpt follow up including  keeping Xarelto on hold and following up with GI to repeat EGD in month, following up with Heme for further recs were also reviewed, pt and pt's daughter verbalized understanding. All questions answered.    Discharge plan discussed with Dr. Estes Spoke to pt via video  Savita  ID #[id number] and with pt's daughter Marni Babin [phone]  Pt for discharge today, hospital course, treatment plan/options, medication changes/outpt follow up including  keeping Xarelto on hold and following up with GI to repeat EGD in month, following up with Heme for further recs were also reviewed, pt and pt's daughter verbalized understanding. All questions answered.    Discharge plan discussed with Dr. Estes

## 2022-06-24 NOTE — PROGRESS NOTE ADULT - SUBJECTIVE AND OBJECTIVE BOX
[   ] ICU                                          [   ] CCU                                      [  x ] Medical Floor    Patient is a 76 year old female with GI bleeding. GI consulted to evaluate.        Pt is a 76 year old female from home with past medical history significant for Colon CA (s/p right hemicolectomy on May 27,2022), Afib (on Xarelto with micra transcatheter pacemaker implanted 7/17/2017), CAD (s/p CABG in 2017) , Dementia, HTN, HLD, osteoporosis, T2DM, OA and PSH of left breast total mastectomy presented to emergency room with melena. Patient is a poor historian due to poor cognition. According to daughter, she had two episodes of black stools today. She also was complaining of abdominal pain. No c/o chest pain, shortness of breath, fever, headache, N/V, urinary complaints.    Patient appears comfortable. No new complaints reported, No abdominal pain, N/V, hematemesis, hematochezia, melena, fever, chills, chest pain, SOB, cough or diarrhea reported.         PAIN MANAGEMENT:  Pain Scale:                 /10  Pain Location:      Prior Colonoscopy:  04/14/2022    PAST MEDICAL HISTORY  Atrial fibrillation and flutter  CAD (coronary artery disease)  Hypertension  DM (diabetes mellitus)  Hypothyroidism  Cataract  Breast cancer  Anemia  Mixed hyperlipidemia  Osteoporosis  Osteopenia  Colon cancer        PAST SURGICAL HISTORY  CABG x 1  Cardiac pacemaker  Cardiac catheterization  Left mastectomy  Right hemicolectomy        Allergies    No Known Allergies    Intolerances  None         MEDICATIONS  (STANDING):  insulin lispro (ADMELOG) corrective regimen sliding scale   SubCutaneous every 6 hours  levothyroxine Injectable 60 MICROGram(s) IV Push at bedtime  pantoprazole  Injectable 40 milliGRAM(s) IV Push two times a day         SOCIAL HISTORY  Advanced Directives:       [ X ] Full Code       [  ] DNR  Marital Status:         [  ] M      [ X ] S      [  ] D       [  ] W  Children:       [X  ] Yes      [  ] No  Occupation:        [  ] Employed       [ X ] Unemployed       [  ] Retired  Diet:       [ X ] Regular       [  ] PEG feeding          [  ] NG tube feeding  Drug Use:           [X  ] Patient denied          [  ] Yes  Alcohol:           [ X ] No             [  ] Yes (socially)         [  ] Yes (chronic)  Tobacco:           [  ] Yes           [ X ] No      FAMILY HISTORY  [ X ] Heart Disease            [ X ] Diabetes             [ X ] HTN             [  ] Colon Cancer             [  ] Stomach Cancer              [  ] Pancreatic Cancer          VITALS  Vital Signs Last 24 Hrs  T(C): 37 (24 Jun 2022 05:36), Max: 37.3 (23 Jun 2022 21:20)  T(F): 98.6 (24 Jun 2022 05:36), Max: 99.2 (23 Jun 2022 21:20)  HR: 52 (24 Jun 2022 13:48) (52 - 94)  BP: 156/73 (24 Jun 2022 13:48) (156/73 - 189/55)   RR: 16 (24 Jun 2022 13:48) (16 - 18)  SpO2: 99% (24 Jun 2022 13:48) (99% - 99%)       MEDICATIONS  (STANDING):  aspirin enteric coated 81 milliGRAM(s) Oral daily  donepezil 10 milliGRAM(s) Oral at bedtime  hydrALAZINE 25 milliGRAM(s) Oral daily  insulin lispro (ADMELOG) corrective regimen sliding scale   SubCutaneous every 6 hours  levothyroxine 75 MICROGram(s) Oral daily  losartan 50 milliGRAM(s) Oral daily  metoprolol succinate  milliGRAM(s) Oral daily  pantoprazole    Tablet 40 milliGRAM(s) Oral every 12 hours  sodium chloride 0.9%. 1000 milliLiter(s) (100 mL/Hr) IV Continuous <Continuous>    MEDICATIONS  (PRN):                            9.5    3.72  )-----------( 170      ( 24 Jun 2022 07:04 )             27.6       06-24    142  |  108  |  11  ----------------------------<  169<H>  3.5   |  26  |  1.12    Ca    8.9      24 Jun 2022 07:04    TPro  6.6  /  Alb  3.2<L>  /  TBili  0.2  /  DBili  x   /  AST  14  /  ALT  20  /  AlkPhos  40  06-24

## 2022-06-24 NOTE — DISCHARGE NOTE NURSING/CASE MANAGEMENT/SOCIAL WORK - NSDCPEFALRISK_GEN_ALL_CORE
For information on Fall & Injury Prevention, visit: [email or web address] OR  [email or web address] OR  https://www.cdc.gov/steadi/patient.html

## 2022-06-24 NOTE — PROGRESS NOTE ADULT - ASSESSMENT
76F s/p laparoscopic-assisted extended right hemicolectomy 5/27/22 for T3N1 adenocarcinoma, presented with anemia and GI bleed, EGD with 1.5cm ulcer in D1.     Continue FU with Heme/oncology for chemotherapy and treatment of anemia  Anemia likely from duodenal ulcer given clinical picture.   Hgb stabilized & no more BM with significant blood  CTA A/P not recommended at this time; may revisit this if patient continues to bleed and more likely source not identifiable  Would prefer to hold off on colonoscopy at this time until at least 6 weeks postop  No surgical intervention at this time.

## 2022-06-24 NOTE — DISCHARGE NOTE NURSING/CASE MANAGEMENT/SOCIAL WORK - PATIENT PORTAL LINK FT
You can access the FollowMyHealth Patient Portal offered by [facility] by registering at the following website: [email or web address]. By joining [employer]’s FollowMyHealth portal, you will also be able to view your health information using other applications (apps) compatible with our system.

## 2022-06-24 NOTE — PROGRESS NOTE ADULT - RESPIRATORY
clear to auscultation bilaterally/no wheezes/no rales/no rhonchi/no respiratory distress
clear to auscultation bilaterally/no wheezes/no rales/no rhonchi/no respiratory distress

## 2022-06-24 NOTE — PROGRESS NOTE ADULT - SUBJECTIVE AND OBJECTIVE BOX
SUBJECTIVE: Patient seen and examined bedside. Patient reports BM with very small amount of blood. Tolerating diet. No issues. No pain.       aspirin enteric coated 81 milliGRAM(s) Oral daily  losartan 50 milliGRAM(s) Oral daily  metoprolol succinate  milliGRAM(s) Oral daily      Vital Signs Last 24 Hrs  T(C): 37 (24 Jun 2022 05:36), Max: 37.3 (23 Jun 2022 21:20)  T(F): 98.6 (24 Jun 2022 05:36), Max: 99.2 (23 Jun 2022 21:20)  HR: 94 (24 Jun 2022 05:36) (66 - 94)  BP: 187/45 (24 Jun 2022 05:36) (149/66 - 189/55)  BP(mean): --  RR: 17 (24 Jun 2022 05:36) (16 - 18)  SpO2: 99% (24 Jun 2022 05:36) (99% - 100%)  I&O's Detail      PHYSICAL EXAMINATION   General: NAD  NEURO: follows commands.   PULM: nonlabored breathing, no respiratory distress  ABD: soft, nondistended, NT  EXTREM: WWP, no edema, no calf tenderness  VASC: No cyanosis,  no pallor.         LABS:                        9.5    3.72  )-----------( 170      ( 24 Jun 2022 07:04 )             27.6     06-24    142  |  108  |  11  ----------------------------<  169<H>  3.5   |  26  |  1.12    Ca    8.9      24 Jun 2022 07:04    TPro  6.6  /  Alb  3.2<L>  /  TBili  0.2  /  DBili  x   /  AST  14  /  ALT  20  /  AlkPhos  40  06-24

## 2022-06-24 NOTE — PROGRESS NOTE ADULT - SUBJECTIVE AND OBJECTIVE BOX
Patient is a 76y old  Female who presents with a chief complaint of GI bleed (23 Jun 2022 16:34)    pt seen in icu [  ], reg med floor [  x ], bed [ x ], chair at bedside [   ], a+o x3 [ x ], lethargic [  ],    nad [x  ]      Allergies    No Known Allergies        Vitals    T(F): 98.6 (06-24-22 @ 05:36), Max: 99.2 (06-23-22 @ 21:20)  HR: 94 (06-24-22 @ 05:36) (66 - 94)  BP: 187/45 (06-24-22 @ 05:36) (149/66 - 189/55)  RR: 17 (06-24-22 @ 05:36) (16 - 18)  SpO2: 99% (06-24-22 @ 05:36) (99% - 100%)  Wt(kg): --  CAPILLARY BLOOD GLUCOSE      POCT Blood Glucose.: 162 mg/dL (23 Jun 2022 23:58)      Labs                          9.1    4.28  )-----------( 146      ( 23 Jun 2022 06:34 )             27.1       06-23    141  |  108  |  16  ----------------------------<  166<H>  3.6   |  25  |  1.01    Ca    8.8      23 Jun 2022 06:34    TPro  6.3  /  Alb  3.1<L>  /  TBili  0.3  /  DBili  x   /  AST  19  /  ALT  19  /  AlkPhos  37<L>  06-22                Radiology Results      Meds    MEDICATIONS  (STANDING):  insulin lispro (ADMELOG) corrective regimen sliding scale   SubCutaneous every 6 hours  levothyroxine Injectable 60 MICROGram(s) IV Push at bedtime  losartan 50 milliGRAM(s) Oral daily  pantoprazole  Injectable 40 milliGRAM(s) IV Push two times a day  sodium chloride 0.9%. 1000 milliLiter(s) (100 mL/Hr) IV Continuous <Continuous>      MEDICATIONS  (PRN):      Physical Exam    Neuro :  no focal deficits  Respiratory: CTA B/L  CV: RRR, S1S2, no murmurs,   Abdominal: Soft, R&L upper quad tender to moderate palp, ND +BS,  Extremities: No edema, + peripheral pulses      ASSESSMENT    symptomatic anemia,   brbpr 2nd to gi bleed,   r/o anastomotic bleed  h/o Colon CA (s/p right hemicolectomy on May 27,2022),   chronic Afib on Xarelto with micra transcatheter pacemaker implanted 7/17/2017,   CAD (s/p CABG in 2017) ,   Dementia,   HTN,   HLD,   osteoporosis,   T2DM,   OA   Breast cancer s/p left breast total mastectomy  Cataract      PLAN    s/p transfuse 4 units prbc,   hold xarelto,   resume anticoag if cleared by gi  gi f/u   h/h stable s/p transfusion  s/p EGD with Normal mucosa in the whole esophagus. Hiatal Hernia. Erythema in the antrum. (Biopsy). Ulcer in the duodenal bulb and first part of the duodenum.  Await pathology results.  Protonix 40mg PO BID  Avoid NSAID  Treat for H. Pylori if positive  Anti reflux measure  Clear liquid diet  surg onc f/u   If bleeding continues, a CT angiogram may be necessary  heme onc cons noted  pt to follow-up outpatient to discuss adjuvant chemotherapy candidacy.   On discharge, patient to follow-up with Dr. Mook Rollins.  cont current meds.     Patient is a 76y old  Female who presents with a chief complaint of GI bleed (23 Jun 2022 16:34)    pt seen in icu [  ], reg med floor [  x ], bed [ x ], chair at bedside [   ], a+o x3 [ x ], lethargic [  ],    nad [x  ]      Allergies    No Known Allergies        Vitals    T(F): 98.6 (06-24-22 @ 05:36), Max: 99.2 (06-23-22 @ 21:20)  HR: 94 (06-24-22 @ 05:36) (66 - 94)  BP: 187/45 (06-24-22 @ 05:36) (149/66 - 189/55)  RR: 17 (06-24-22 @ 05:36) (16 - 18)  SpO2: 99% (06-24-22 @ 05:36) (99% - 100%)  Wt(kg): --  CAPILLARY BLOOD GLUCOSE      POCT Blood Glucose.: 162 mg/dL (23 Jun 2022 23:58)      Labs                          9.1    4.28  )-----------( 146      ( 23 Jun 2022 06:34 )             27.1       06-23    141  |  108  |  16  ----------------------------<  166<H>  3.6   |  25  |  1.01    Ca    8.8      23 Jun 2022 06:34    TPro  6.3  /  Alb  3.1<L>  /  TBili  0.3  /  DBili  x   /  AST  19  /  ALT  19  /  AlkPhos  37<L>  06-22                Radiology Results      Meds    MEDICATIONS  (STANDING):  insulin lispro (ADMELOG) corrective regimen sliding scale   SubCutaneous every 6 hours  levothyroxine Injectable 60 MICROGram(s) IV Push at bedtime  losartan 50 milliGRAM(s) Oral daily  pantoprazole  Injectable 40 milliGRAM(s) IV Push two times a day  sodium chloride 0.9%. 1000 milliLiter(s) (100 mL/Hr) IV Continuous <Continuous>      MEDICATIONS  (PRN):      Physical Exam    Neuro :  no focal deficits  Respiratory: CTA B/L  CV: RRR, S1S2, no murmurs,   Abdominal: Soft, NT, ND +BS,  Extremities: No edema, + peripheral pulses      ASSESSMENT    symptomatic anemia,   brbpr 2nd to gi bleed,   r/o anastomotic bleed  h/o Colon CA (s/p right hemicolectomy on May 27,2022),   chronic Afib on Xarelto with micra transcatheter pacemaker implanted 7/17/2017,   CAD (s/p CABG in 2017) ,   Dementia,   HTN,   HLD,   osteoporosis,   T2DM,   OA   Breast cancer s/p left breast total mastectomy  Cataract      PLAN    s/p transfuse 4 units prbc,    add ec asa 81 mg  hold xarelto,   gi f/u   h/h stable s/p transfusion  s/p EGD with Normal mucosa in the whole esophagus. Hiatal Hernia. Erythema in the antrum. (Biopsy). Ulcer in the duodenal bulb and first part of the duodenum.  Await pathology results.   High risk of bleeding with anticoagulation  Protonix 40mg PO BID  Avoid NSAID  Treat for H. Pylori if positive  Anti reflux measure  pt toleratng full diet   surg onc f/u   Continue FU with Heme/oncology for chemotherapy and treatment of anemia  Anemia likely from duodenal ulcer given clinical picture.   Hgb stabilized & no more BM with significant blood  CTA A/P not recommended at this time; may revisit this if patient continues to bleed and more likely source not identifiable  Would prefer to hold off on colonoscopy at this time until at least 6 weeks postop  No surgical intervention at this time.  heme onc f/u   pt to follow-up outpatient to discuss adjuvant chemotherapy candidacy.   On discharge, patient to follow-up with Dr. Mook Rollins.  cont current meds.   pt stable for d/c

## 2022-06-24 NOTE — PROGRESS NOTE ADULT - CARDIOVASCULAR
regular rate and rhythm/S1 S2 present/no gallops/no rub/no murmur/no JVD
regular rate and rhythm/S1 S2 present/no gallops/no rub/no murmur/no JVD

## 2022-06-28 LAB — GLUCOSE BLDC GLUCOMTR-MCNC: 129 MG/DL — HIGH (ref 70–99)

## 2022-06-29 ENCOUNTER — APPOINTMENT (OUTPATIENT)
Dept: SURGICAL ONCOLOGY | Facility: CLINIC | Age: 77
End: 2022-06-29

## 2022-06-29 VITALS — TEMPERATURE: 98.3 F

## 2022-06-29 VITALS
SYSTOLIC BLOOD PRESSURE: 149 MMHG | WEIGHT: 138 LBS | HEART RATE: 65 BPM | DIASTOLIC BLOOD PRESSURE: 66 MMHG | BODY MASS INDEX: 24.45 KG/M2

## 2022-06-29 PROCEDURE — 99024 POSTOP FOLLOW-UP VISIT: CPT

## 2022-06-30 NOTE — HISTORY OF PRESENT ILLNESS
[de-identified] : Ms. CYNDIE CHAPARRO is a 76 year old female who present today a post-op visit, s/p laparoscopic extended right hemicolectomy on 5/27/2022. Final pathology shows, moderately differentiated adenocarcinoma of the ascending colon. Adenocarcinoma invades through the muscularis propria into the pericolic adipose tissue. 3/16 regional lymph nodes positive for metastatic carcinoma. (pT3 pN1a)\par \par She was admitted to [facility] with anemia and underwent a colonoscopy- found to have a malignant friable hepatic flexure mass. \par \par EGD 4/14/22: \par 1. Rectum; polypectomy: Hyperplastic polyp \par 2. Colon at hepatic flexure biopsy: Invasive adenocarcinoma, moderately differentiated. \par 3. Gastric antrum biopsy: Gastric mucosa without significant pathology. H. pylori are not identified with cresyl violet stain. \par \par CT Chest/abd/pelvis April 2022: A known hepatic flexure lesion recently biopsied on colonoscopy is not discretely visualized on this exam. There is thickening versus under distention at the hepatic flexure. There are no inflammatory changes or adjacent lymph nodes. No evidence for distant metastases.\par \par Cyndie is now post-op from a laparoscopic extended right hemicolectomy on 5/27/2022. \par Final pathology shows, moderately differentiated adenocarcinoma of the ascending colon, margins negative. Adenocarcinoma invades through the muscularis propria into the pericolic adipose tissue. 3/16 regional lymph nodes positive for metastatic carcinoma. (pT3 pN1a)\par \par Recently re-admitted on 6/20/2022 for GI bleed post-op, s/p 2U PRBC with improvement to H&H\par EGD done on 6/22/2022 showed duodenal ulcer.\par She tolerated clears, was advanced to regular diet with instructions to not resume AC at this time due to the size of the duodenal ulcer. Recommendations to repeat EGD in 1 month, continue protonix BID.\par \par 6/29/2022- Today Cyndie reports persistent stomach pain, whenever she eats she has increased pain & gas, feels "like a balloon". reports regular bowel movements, \par \par Dr. Oscar Adair- PCP

## 2022-06-30 NOTE — ASSESSMENT
[FreeTextEntry1] : IMP: 76 year old female present with symptomatic anemia- with hepatic flexure adenocarcinoma\par \par s/p laparoscopic extended right hemicolectomy on 5/27/2022\par Final pathology shows, moderately differentiated adenocarcinoma of the ascending colon. Adenocarcinoma invades through the muscularis propria into the pericolic adipose tissue. 3/16 regional lymph nodes positive for metastatic carcinoma. (pT3 pN1a)\par \par Recently re-admitted on 6/20-6/24/2022 for GI bleed post-op, s/p 2U PRBC with improvement to H&H\par EGD done on 6/22/2022 showed duodenal ulcer.\par She tolerated clears, was advanced to regular diet with instructions to not resume AC at this time due to the size of the duodenal ulcer. Recommendations to repeat EGD in 1 month, continue protonix BID.\par \par PLAN: \par continue follow-up with Dr. Mook Rollins (she states she saw him Monday )\par f.u with Dr. kurtz for her recent admission of UGI bleed and EGD showing Duodenal ulcer\par RTO 4 months after repeat scan (under c/o Dr. Rollins)\par \par \par I have discussed the diagnosis, therapeutic plan and options with the patient at length. Patient expressed verbal understanding to proceed with proposed plan. All questions answered. \par   \par

## 2022-06-30 NOTE — CONSULT LETTER
[DrRocio  ___] : Dr. AVELAR [FreeTextEntry3] : Catrachito Dee MD, FICS, FACS\par Director of Surgical Oncology- [facility]\par ,Department of Surgery\par [facility]\par [address]\[place], [place]- [address]\par \par [address]\[place]- [address]\par \par [address]\[place]- [address]\par \par (mob) [phone]\par (o) [phone]\par (f) [phone]\par  [DrRocio ___] : Dr. AVELAR

## 2022-06-30 NOTE — PHYSICAL EXAM
[FreeTextEntry1] : COVID-19 precautions as per [facility] policy was universally followed  [de-identified] : surgical incision healing well, no evidence of infection

## 2022-08-09 NOTE — CONSULT NOTE ADULT - MUSCULOSKELETAL
details… detailed exam all other ROS negative except as per HPI no joint swelling/no joint erythema/no joint warmth/no calf tenderness

## 2022-10-13 NOTE — H&P PST ADULT - TEMPERATURE IN FAHRENHEIT (DEGREES F)
Plan: Recommended UPF clothing and broad brim hats.
Detail Level: Zone
Plan: If Pt would like to have SKs removed, recommended to consult with Dr. Soto.
98.3

## 2022-10-31 ENCOUNTER — RESULT REVIEW (OUTPATIENT)
Age: 77
End: 2022-10-31

## 2022-10-31 ENCOUNTER — OUTPATIENT (OUTPATIENT)
Dept: OUTPATIENT SERVICES | Facility: HOSPITAL | Age: 77
LOS: 1 days | End: 2022-10-31
Payer: MEDICARE

## 2022-10-31 ENCOUNTER — TRANSCRIPTION ENCOUNTER (OUTPATIENT)
Age: 77
End: 2022-10-31

## 2022-10-31 VITALS
RESPIRATION RATE: 18 BRPM | HEART RATE: 62 BPM | DIASTOLIC BLOOD PRESSURE: 50 MMHG | SYSTOLIC BLOOD PRESSURE: 142 MMHG | OXYGEN SATURATION: 97 %

## 2022-10-31 VITALS
HEART RATE: 62 BPM | HEIGHT: 62 IN | SYSTOLIC BLOOD PRESSURE: 177 MMHG | DIASTOLIC BLOOD PRESSURE: 48 MMHG | RESPIRATION RATE: 20 BRPM | TEMPERATURE: 98 F | OXYGEN SATURATION: 99 % | WEIGHT: 139.99 LBS

## 2022-10-31 DIAGNOSIS — Z98.890 OTHER SPECIFIED POSTPROCEDURAL STATES: Chronic | ICD-10-CM

## 2022-10-31 DIAGNOSIS — Z95.0 PRESENCE OF CARDIAC PACEMAKER: Chronic | ICD-10-CM

## 2022-10-31 DIAGNOSIS — Z95.1 PRESENCE OF AORTOCORONARY BYPASS GRAFT: Chronic | ICD-10-CM

## 2022-10-31 DIAGNOSIS — K62.5 HEMORRHAGE OF ANUS AND RECTUM: ICD-10-CM

## 2022-10-31 DIAGNOSIS — Z90.12 ACQUIRED ABSENCE OF LEFT BREAST AND NIPPLE: Chronic | ICD-10-CM

## 2022-10-31 DIAGNOSIS — Z90.49 ACQUIRED ABSENCE OF OTHER SPECIFIED PARTS OF DIGESTIVE TRACT: Chronic | ICD-10-CM

## 2022-10-31 LAB
GLUCOSE BLDC GLUCOMTR-MCNC: 219 MG/DL — HIGH (ref 70–99)
GLUCOSE BLDC GLUCOMTR-MCNC: 324 MG/DL — HIGH (ref 70–99)
GLUCOSE BLDC GLUCOMTR-MCNC: 347 MG/DL — HIGH (ref 70–99)

## 2022-10-31 PROCEDURE — 88312 SPECIAL STAINS GROUP 1: CPT | Mod: 26

## 2022-10-31 PROCEDURE — 43239 EGD BIOPSY SINGLE/MULTIPLE: CPT

## 2022-10-31 PROCEDURE — 88312 SPECIAL STAINS GROUP 1: CPT

## 2022-10-31 PROCEDURE — 88305 TISSUE EXAM BY PATHOLOGIST: CPT | Mod: 26

## 2022-10-31 PROCEDURE — 82962 GLUCOSE BLOOD TEST: CPT

## 2022-10-31 PROCEDURE — 88305 TISSUE EXAM BY PATHOLOGIST: CPT

## 2022-10-31 RX ORDER — INSULIN LISPRO 100/ML
5 VIAL (ML) SUBCUTANEOUS ONCE
Refills: 0 | Status: COMPLETED | OUTPATIENT
Start: 2022-10-31 | End: 2022-10-31

## 2022-10-31 RX ORDER — INSULIN LISPRO 100/ML
8 VIAL (ML) SUBCUTANEOUS ONCE
Refills: 0 | Status: COMPLETED | OUTPATIENT
Start: 2022-10-31 | End: 2022-10-31

## 2022-10-31 RX ADMIN — Medication 8 UNIT(S): at 11:10

## 2022-10-31 RX ADMIN — Medication 5 UNIT(S): at 09:41

## 2022-10-31 NOTE — ASU PATIENT PROFILE, ADULT - FALL HARM RISK - RISK INTERVENTIONS
Assistance OOB with selected safe patient handling equipment/Communicate Fall Risk and Risk Factors to all staff, patient, and family/Reinforce activity limits and safety measures with patient and family/Visual Cue: Yellow wristband/Bed in lowest position, wheels locked, appropriate side rails in place/Call bell, personal items and telephone in reach/Instruct patient to call for assistance before getting out of bed or chair/Non-slip footwear when patient is out of bed/[place] to call system/Physically safe environment - no spills, clutter or unnecessary equipment/Purposeful Proactive Rounding/Room/bathroom lighting operational, light cord in reach

## 2022-11-02 ENCOUNTER — APPOINTMENT (OUTPATIENT)
Dept: SURGICAL ONCOLOGY | Facility: CLINIC | Age: 77
End: 2022-11-02

## 2022-11-02 LAB — SURGICAL PATHOLOGY STUDY: SIGNIFICANT CHANGE UP

## 2022-11-30 ENCOUNTER — APPOINTMENT (OUTPATIENT)
Dept: SURGICAL ONCOLOGY | Facility: CLINIC | Age: 77
End: 2022-11-30

## 2022-11-30 VITALS — TEMPERATURE: 96.3 F

## 2022-11-30 VITALS
DIASTOLIC BLOOD PRESSURE: 70 MMHG | SYSTOLIC BLOOD PRESSURE: 160 MMHG | HEART RATE: 72 BPM | OXYGEN SATURATION: 97 % | WEIGHT: 138 LBS | BODY MASS INDEX: 24.45 KG/M2 | HEIGHT: 63 IN

## 2022-11-30 PROCEDURE — 99214 OFFICE O/P EST MOD 30 MIN: CPT

## 2022-12-08 NOTE — ASSESSMENT
[FreeTextEntry1] : IMP: 77 year old female present with symptomatic anemia- with hepatic flexure adenocarcinoma\par \par s/p laparoscopic extended right hemicolectomy on 5/27/2022\par Final pathology shows, moderately differentiated adenocarcinoma of the ascending colon. Adenocarcinoma invades through the muscularis propria into the pericolic adipose tissue. 3/16 regional lymph nodes positive for metastatic carcinoma. (pT3 pN1a)\par \par re-admitted on 6/20-6/24/2022 for GI bleed post-op, s/p 2U PRBC with improvement to H&H\par EGD done on 6/22/2022 showed duodenal ulcer.\par She tolerated clears, was advanced to regular diet with instructions to not resume AC at this time due to the size of the duodenal ulcer. Recommendations to repeat EGD in 1 month, continue protonix BID.\par \par PET/CT 11/21/22: \par 1. Nonspecific pattern of increased activity in bowel loops abutting the anastomotic site in the right side of the abdomen with no distinct focal abnormal activity that is concerning. No hypermetabolic nodes. \par 2. No evidence of locally recurrent FDG avid breast malignancy. \par \par PLAN: \par RTO 6 months after imaging (defer to Dr. Rollins) \par Advise to follow up with Dr. Tapia for abdominal discomfort and to continue care with Dr. Rollins \par \par I have discussed the diagnosis, therapeutic plan and options with the patient at length. Patient expressed verbal understanding to proceed with proposed plan. All questions answered. \par   \par

## 2022-12-08 NOTE — CONSULT LETTER
[FreeTextEntry2] : Oscar Adair MD [FreeTextEntry3] : Catrachito Dee MD, FICS, FACS\par Director of Surgical Oncology- [facility]\par ,Department of Surgery\par [facility]\par [address]\[place]- [address]\par \par [address]\[place]- [address]\par \par [address]\[place]- [address]\par \par (mob) [phone]\par (o) [phone]\par (f) [phone]\par

## 2022-12-08 NOTE — PHYSICAL EXAM
[FreeTextEntry1] : COVID-19 precautions as per [facility] policy was universally followed  [de-identified] : surgical incision healing well, no evidence of infection

## 2022-12-08 NOTE — HISTORY OF PRESENT ILLNESS
[de-identified] : Ms. CYNDIE CHAPARRO is a 77 year old female who present today a follow-up visit \par \par She was admitted to [facility] with anemia and underwent a colonoscopy- found to have a malignant friable hepatic flexure mass. \par \par EGD 4/14/22: \par 1. Rectum; polypectomy: Hyperplastic polyp \par 2. Colon at hepatic flexure biopsy: Invasive adenocarcinoma, moderately differentiated. \par 3. Gastric antrum biopsy: Gastric mucosa without significant pathology. H. pylori are not identified with cresyl violet stain. \par \par CT Chest/abd/pelvis April 2022: A known hepatic flexure lesion recently biopsied on colonoscopy is not discretely visualized on this exam. There is thickening versus under distention at the hepatic flexure. There are no inflammatory changes or adjacent lymph nodes. No evidence for distant metastases.\par \par Cyndie is now post-op from a laparoscopic extended right hemicolectomy on 5/27/2022. \par Final pathology shows, moderately differentiated adenocarcinoma of the ascending colon, margins negative. Adenocarcinoma invades through the muscularis propria into the pericolic adipose tissue. 3/16 regional lymph nodes positive for metastatic carcinoma. (pT3 pN1a)\par \par Recently re-admitted on 6/20/2022 for GI bleed post-op, s/p 2U PRBC with improvement to H&H\par EGD done on 6/22/2022 showed duodenal ulcer.\par She tolerated clears, was advanced to regular diet with instructions to not resume AC at this time due to the size of the duodenal ulcer. Recommendations to repeat EGD in 1 month, continue protonix BID.\par \par 6/29/2022- Cyndie reports persistent stomach pain, whenever she eats she has increased pain & gas, feels "like a balloon". reports regular bowel movements, continue follow-up with Dr. Mook Rollins (she states she saw him Monday )\par f.u with Dr. tapia for her recent admission of UGI bleed and EGD showing Duodenal ulcer\par \par PET/CT 11/21/22: \par 1. Nonspecific pattern of increased activity in bowel loops abutting the anastomotic site in the right side of the abdomen with no distinct focal abnormal activity that is concerning. No hypermetabolic nodes. \par 2. No evidence of locally recurrent FDG avid breast malignancy. \par \par Today patient present with complaint of abdominal discomfort patient recent endoscopy revealed an ulcer. Patient recommended to follow up with Dr. Tapia  \par \par Dr. Oscar Adair- PCP

## 2023-01-11 ENCOUNTER — APPOINTMENT (OUTPATIENT)
Dept: SURGICAL ONCOLOGY | Facility: CLINIC | Age: 78
End: 2023-01-11

## 2023-01-13 NOTE — ED ADULT NURSE NOTE - NSICDXPASTSURGICALHX_GEN_ALL_CORE_FT
PROVIDER:[TOKEN:[[id number]],FOLLOWUP:[1 month],ESTABLISHEDPATIENT:[T]]
PAST SURGICAL HISTORY:  Cardiac pacemaker 7/17/2017    History of left mastectomy     S/P CABG x 1     S/P cardiac catheterization 5/10/2022

## 2023-03-03 NOTE — DISCHARGE NOTE PROVIDER - NSDCACTIVITY_GEN_ALL_CORE
Stefani Brantley is 81-year-old woman with medical history of for thyroidism, history of Bhavya-en-Y gastric bypass surgery, was seen for hypoglycemia evaluation  Was started on continuous glucose monitor sensor analysis  Stefani Brantley   Device used , continuous glucose monitor sensor by [facility]- Physician Provided Equipment    Indication   Hypoglycemia    More than 72 hours of data was reviewed  Report to be scanned to chart  Date Range: February 22 to March 3, 2023    Analysis of data:   Average Glucose: 139 mg per DL  Coefficient of Variation: 23%  SD : 33 mg per DL  Time in Target Range: 88% in range  Time Above Range: 11% about the range  Time Below Range: 0%    Interpretation of data: Patient has blood sugars within normal range most of the time  She has elevation in blood sugar after meals most likely from high carbohydrate meal or food with high glycemic causing spikes in blood sugars  In the last 10 days there is no single hypoglycemic episode, which rules out hypoglycemia from endogenous insulin over-production   No fasting hypoglycemia noted    Recommendation  -Eat protein with each meal to avoid spikes in blood sugar   -Patient was educated to cut down on high glycemic index food, sweets  -Refer to diabetes education/nutrition therapy, for reactive hypoglycemia diet   -Discussed to start acarbose 25 mg before breakfast, lunch and dinner, avoid spike in blood sugar, and prevent reactive hypoglycemia    [place] Showering allowed/No heavy lifting/straining Do not drive or operate machinery/Showering allowed/Walking - Indoors allowed/No heavy lifting/straining/Walking - Outdoors allowed

## 2023-03-17 NOTE — PATIENT PROFILE ADULT - DIABETES EDUCATION
Caller Name: Nica Magallon   Call Back Number: [phone]    How would the patient prefer to be contacted with a response: MyChart message    Patient states that she has been struggling with an increase and worsening migraines without having the Botox along with the monthly Aimovig injections. Patient is wanting to discuss being able to do both therapies as this has been the only way that she has gotten relief. Last Botox 03/2022. Pleas advise. Thank you!  -JENNY Anderson.    diabetes education

## 2023-05-29 ENCOUNTER — APPOINTMENT (OUTPATIENT)
Dept: CARE COORDINATION | Facility: HOME HEALTH | Age: 78
End: 2023-05-29

## 2023-06-07 ENCOUNTER — APPOINTMENT (OUTPATIENT)
Dept: SURGICAL ONCOLOGY | Facility: CLINIC | Age: 78
End: 2023-06-07
Payer: MEDICARE

## 2023-06-07 VITALS
HEIGHT: 63 IN | BODY MASS INDEX: 26.93 KG/M2 | RESPIRATION RATE: 16 BRPM | WEIGHT: 152 LBS | HEART RATE: 90 BPM | OXYGEN SATURATION: 97 % | DIASTOLIC BLOOD PRESSURE: 55 MMHG | SYSTOLIC BLOOD PRESSURE: 145 MMHG

## 2023-06-07 PROCEDURE — 99024 POSTOP FOLLOW-UP VISIT: CPT

## 2023-07-13 NOTE — PHYSICAL EXAM
[Normal] : supple, no neck mass and thyroid not enlarged [Normal Neck Lymph Nodes] : normal neck lymph nodes  [Normal Supraclavicular Lymph Nodes] : normal supraclavicular lymph nodes [Normal Groin Lymph Nodes] : normal groin lymph nodes [Normal Axillary Lymph Nodes] : normal axillary lymph nodes [Normal] : oriented to person, place and time, with appropriate affect [FreeTextEntry1] : COVID-19 precautions as per [facility] policy was universally followed  [de-identified] : surgical incision healing well, no evidence of infection

## 2023-07-13 NOTE — CONSULT LETTER
[Dear  ___] : Dear  [unfilled], [Consult Letter:] : I had the pleasure of evaluating your patient, [unfilled]. [( Thank you for referring [unfilled] for consultation for _____ )] : Thank you for referring [unfilled] for consultation for [unfilled] [Please see my note below.] : Please see my note below. [Consult Closing:] : Thank you very much for allowing me to participate in the care of this patient.  If you have any questions, please do not hesitate to contact me. [Sincerely,] : Sincerely, [DrRocio  ___] : Dr. AVELAR [DrRocio ___] : Dr. AVELAR [FreeTextEntry2] : Oscar Adair MD [FreeTextEntry3] : Catrachito Dee MD, FICS, FACS\par Director of Surgical Oncology- [facility]\par ,Department of Surgery\par [facility]\par [address]\[place]- [address]\par \par [address]\[place]- [address]\par \par [address]\[place]- [address]\par \par (mob) [phone]\par (o) [phone]\par (f) [phone]\par

## 2023-07-13 NOTE — ASSESSMENT
[FreeTextEntry1] : IMP: 77 year old female present with symptomatic anemia- with hepatic flexure adenocarcinoma\par \par s/p laparoscopic extended right hemicolectomy on 5/27/2022\par Final pathology shows, moderately differentiated adenocarcinoma of the ascending colon. Adenocarcinoma invades through the muscularis propria into the pericolic adipose tissue. 3/16 regional lymph nodes positive for metastatic carcinoma. (pT3 pN1a)\par \par re-admitted on 6/20-6/24/2022 for GI bleed post-op, s/p 2U PRBC with improvement to H&H\par EGD done on 6/22/2022 showed duodenal ulcer.\par She tolerated clears, was advanced to regular diet with instructions to not resume AC at this time due to the size of the duodenal ulcer. Recommendations to repeat EGD in 1 month, continue protonix BID.\par \par PET/CT 5/10/23- ALEXANDRA\par \par PLAN: \par RTO 6 months after imaging (defer to Dr. Rollins) \par Advise to follow up with Dr. Tapia for abdominal discomfort and to continue care with Dr. Rollins \par \par I have discussed the diagnosis, therapeutic plan and options with the patient at length. Patient expressed verbal understanding to proceed with proposed plan. All questions answered. \par   \par

## 2023-07-13 NOTE — HISTORY OF PRESENT ILLNESS
[de-identified] : Ms. CYNDIE CHAPARRO is a 77 year old female who present today a follow-up visit \par \par She was admitted to [facility] with anemia and underwent a colonoscopy- found to have a malignant friable hepatic flexure mass. \par \par EGD 4/14/22: \par 1. Rectum; polypectomy: Hyperplastic polyp \par 2. Colon at hepatic flexure biopsy: Invasive adenocarcinoma, moderately differentiated. \par 3. Gastric antrum biopsy: Gastric mucosa without significant pathology. H. pylori are not identified with cresyl violet stain. \par \par CT Chest/abd/pelvis April 2022: A known hepatic flexure lesion recently biopsied on colonoscopy is not discretely visualized on this exam. There is thickening versus under distention at the hepatic flexure. There are no inflammatory changes or adjacent lymph nodes. No evidence for distant metastases.\par \par Cyndie is now post-op from a laparoscopic extended right hemicolectomy on 5/27/2022. \par Final pathology shows, moderately differentiated adenocarcinoma of the ascending colon, margins negative. Adenocarcinoma invades through the muscularis propria into the pericolic adipose tissue. 3/16 regional lymph nodes positive for metastatic carcinoma. (pT3 pN1a)\par \par Re-admitted on 6/20/2022 for GI bleed post-op, s/p 2U PRBC with improvement to H&H\par EGD done on 6/22/2022 showed duodenal ulcer.\par She tolerated clears, was advanced to regular diet with instructions to not resume AC at this time due to the size of the duodenal ulcer. Recommendations to repeat EGD in 1 month, continue protonix BID.\par \par 6/29/2022- Cyndie reports persistent stomach pain, whenever she eats she has increased pain & gas, feels "like a balloon". reports regular bowel movements, continue follow-up with Dr. Mook Rollins (she states she saw him Monday )\par f.u with Dr. tapia for her recent admission of UGI bleed and EGD showing Duodenal ulcer\par \par PET/CT 11/21/22: \par 1. Nonspecific pattern of increased activity in bowel loops abutting the anastomotic site in the right side of the abdomen with no distinct focal abnormal activity that is concerning. No hypermetabolic nodes. \par 2. No evidence of locally recurrent FDG avid breast malignancy. \par \par 11/30/22- Today patient present with complaint of abdominal discomfort patient recent endoscopy revealed an ulcer. Patient recommended to follow up with Dr. Tapia  \par \par PET/CT 5/10/23 (per Dr. Rollins)- no FDG evidence of malignant disease.\par \par Dr. Oscar Adair- PCP

## 2023-09-18 NOTE — DISCHARGE NOTE PROVIDER - NSDCFUADDAPPT_GEN_ALL_CORE_FT
Patient Rounding sent to patient via [facility] e-mail.     It is important that you have the follow up with Hematology to continue care and discuss the need for more treatment.

## 2023-11-30 ENCOUNTER — APPOINTMENT (OUTPATIENT)
Dept: NEUROSURGERY | Facility: CLINIC | Age: 78
End: 2023-11-30
Payer: MEDICARE

## 2023-11-30 VITALS
TEMPERATURE: 97.9 F | HEIGHT: 63 IN | DIASTOLIC BLOOD PRESSURE: 77 MMHG | SYSTOLIC BLOOD PRESSURE: 160 MMHG | OXYGEN SATURATION: 98 % | WEIGHT: 154 LBS | HEART RATE: 62 BPM | BODY MASS INDEX: 27.29 KG/M2

## 2023-11-30 DIAGNOSIS — S32.000G WEDGE COMPRESSION FRACTURE OF UNSPECIFIED LUMBAR VERTEBRA, SUBSEQUENT ENCOUNTER FOR FRACTURE WITH DELAYED HEALING: ICD-10-CM

## 2023-11-30 DIAGNOSIS — M54.9 DORSALGIA, UNSPECIFIED: ICD-10-CM

## 2023-11-30 DIAGNOSIS — M47.816 SPONDYLOSIS W/OUT MYELOPATHY OR RADICULOPATHY, LUMBAR REGION: ICD-10-CM

## 2023-11-30 PROCEDURE — 99203 OFFICE O/P NEW LOW 30 MIN: CPT

## 2023-12-13 ENCOUNTER — APPOINTMENT (OUTPATIENT)
Dept: SURGICAL ONCOLOGY | Facility: CLINIC | Age: 78
End: 2023-12-13

## 2023-12-14 NOTE — HISTORY OF PRESENT ILLNESS
[FreeTextEntry1] : LBP [de-identified] : Mrs. Burks is a 77 y/o, F, with a hx of colon CA s/p colectomy and CT, CAD, afib on xarelto, pacemaker, HLD, HTN, DM II, hypothyroidism, and chronic LBP here to establish care as a new patient. She reports 10/10 back pain worsened by activity and walking. She has numbness and tingling of LE due to diabetic neuropathy. CT lumbar spine with chronic L1 and L3 compression fractures and lumbar spondylosis. She denies bowel/bladder dysfunction.

## 2023-12-14 NOTE — ASSESSMENT
[FreeTextEntry1] : Mrs. Burks is a 77 y/o, F, w/ severe low back pain, osteoporosis, afib with pacemaker on xarelto, and chronic L1 and L3 compression fractures w/ lumbar spondylosis. No neurosurgical intervention recommended at this time. Recommend conservative management with PT and EPSI to improve pain.

## 2023-12-21 NOTE — PHYSICAL EXAM
[FreeTextEntry1] : COVID-19 precautions as per [facility] policy was universally followed  [de-identified] : soft, non-distended, non-tender to palpation.

## 2023-12-21 NOTE — CONSULT LETTER
[FreeTextEntry2] : Oscar Adair MD [FreeTextEntry3] : Catrachito Dee MD, FICS, FACS Director of Surgical Oncology- [facility] , Department of Surgery [facility] at [address] [address]   (mob) [phone] (o) [phone] (f) [phone]

## 2023-12-21 NOTE — HISTORY OF PRESENT ILLNESS
[de-identified] : Ms. CYNDIE CHAPARRO is a 78 year old female presenting with colon cancer  s/p laparoscopic extended right hemicolectomy on 5/27/2022  She was admitted to [facility] with anemia and underwent a colonoscopy- found to have a malignant friable hepatic flexure mass.   EGD 4/14/22:  1. Rectum; polypectomy: Hyperplastic polyp  2. Colon at hepatic flexure biopsy: Invasive adenocarcinoma, moderately differentiated.  3. Gastric antrum biopsy: Gastric mucosa without significant pathology. H. pylori are not identified with cresyl violet stain.   CT Chest/abd/pelvis April 2022: A known hepatic flexure lesion recently biopsied on colonoscopy is not discretely visualized on this exam. There is thickening versus under distention at the hepatic flexure. There are no inflammatory changes or adjacent lymph nodes. No evidence for distant metastases.  Cyndie is now post-op from a laparoscopic extended right hemicolectomy on 5/27/2022.  Final pathology shows, moderately differentiated adenocarcinoma of the ascending colon, margins negative. Adenocarcinoma invades through the muscularis propria into the pericolic adipose tissue. 3/16 regional lymph nodes positive for metastatic carcinoma. (pT3 pN1a)  Re-admitted on 6/20/2022 for GI bleed post-op, s/p 2U PRBC with improvement to H&H EGD done on 6/22/2022 showed duodenal ulcer. She tolerated clears, was advanced to regular diet with instructions to not resume AC at this time due to the size of the duodenal ulcer. Recommendations to repeat EGD in 1 month, continue protonix BID.  6/29/2022- Cyndie reports persistent stomach pain, whenever she eats she has increased pain & gas, feels "like a balloon". reports regular bowel movements, continue follow-up with Dr. Mook Rollins (she states she saw him Monday ) f.u with Dr. tapia for her recent admission of UGI bleed and EGD showing Duodenal ulcer  PET/CT 11/21/22:  1. Nonspecific pattern of increased activity in bowel loops abutting the anastomotic site in the right side of the abdomen with no distinct focal abnormal activity that is concerning. No hypermetabolic nodes.  2. No evidence of locally recurrent FDG avid breast malignancy.   11/30/22- Today patient present with complaint of abdominal discomfort patient recent endoscopy revealed an ulcer. Patient recommended to follow up with Dr. Tapia    PET/CT 5/10/23 (per Dr. Rollins)- no FDG evidence of malignant disease.  5/15/2023 Colonoscopy advanced to cecum: colon polyp: hyperplastic polyp   11/29/2023 PET ([facility]): 1. New localized activity at site of postsurgical changes involving the bowel in the right abdomen. Correlate on colonoscopy. 2. New compression deformity at L3 where there is mild activity. Consider correlation with MRI of lumbar spine if there are no contraindications.  3. Otherwise no evidence of FDG avid malignant disease elsewhere in the field of view.   Dr. Oscar Adair- PCP

## 2023-12-21 NOTE — ASSESSMENT
[FreeTextEntry1] : IMP: 78 year old female present with symptomatic anemia- with hepatic flexure adenocarcinoma s/p laparoscopic extended right hemicolectomy on 5/27/2022  Final pathology shows, moderately differentiated adenocarcinoma of the ascending colon. Adenocarcinoma invades through the muscularis propria into the pericolic adipose tissue. 3/16 regional lymph nodes positive for metastatic carcinoma. (pT3 pN1a)  re-admitted on 6/20-6/24/2022 for GI bleed post-op, s/p 2U PRBC with improvement to H&H  EGD done on 6/22/2022 showed duodenal ulcer.  She tolerated clears, was advanced to regular diet with instructions to not resume AC at this time due to the size of the duodenal ulcer. Recommendations to repeat EGD in 1 month, continue protonix BID.  PET/CT 5/10/23- ALEXANDRA 5/15/2023 Colonoscopy advanced to cecum: colon polyp: hyperplastic polyp  5/19/2023 CEA 2.3 11/29/2023 PET ([place]): 1. New localized activity at site of postsurgical changes involving the bowel in the right abdomen. Correlate on colonoscopy. 2. New compression deformity at L3 where there is mild activity. Consider correlation with MRI of lumbar spine if there are no contraindications.  3. Otherwise no evidence of FDG avid malignant disease elsewhere in the field of view.   PLAN:  - Follow up with oncologist Dr. Yanez; imaging deferred to him - RTO 6 months after imaging  I have discussed the diagnosis, therapeutic plan and options with the patient at length. Patient expressed verbal understanding to proceed with proposed plan. All questions answered.

## 2023-12-27 ENCOUNTER — APPOINTMENT (OUTPATIENT)
Dept: SURGICAL ONCOLOGY | Facility: CLINIC | Age: 78
End: 2023-12-27

## 2024-04-05 NOTE — ED ADULT NURSE NOTE - NS ED NURSE RECORD ANOTHER VITAL SIGN
Continue the BuSpar.  We discussed that her anxiety is currently related to her upcoming move which I think will be great for her.  
Patient comes into the office today for impacted cerumen removal of left ear.  Using the otoscope, the patient's ears are examined. Exam shows obvious cerumen impaction.  Using warm water in the OtoClear Spray Wash bottle and currette, a negligible of cerumen is gently and carefully removed from the left ear.   After disimpaction the ear is reexamined using the otoscope. The tympanic membrane and external ear canal are within normal limits..  Patient tolerated the procedure well and notes improved hearing. Approximately 10 minutes were spent with this patient.    Unfortunately since we were not able to remove much of the cerumen I did advise her to use Debrox eardrops daily for the next week    
Planning to move into assisted living as of tomorrow.  She does plan to follow up with the in facility providers.  I did let her know that I am happy to see her if needed in the future.  
Recheck a TSH today  
Yes

## 2024-04-07 ENCOUNTER — APPOINTMENT (OUTPATIENT)
Dept: CARE COORDINATION | Facility: HOME HEALTH | Age: 79
End: 2024-04-07
Payer: MEDICARE

## 2024-04-07 VITALS — WEIGHT: 154 LBS | HEIGHT: 63 IN | BODY MASS INDEX: 27.29 KG/M2

## 2024-04-07 DIAGNOSIS — Z79.4 TYPE 2 DIABETES MELLITUS WITH HYPERGLYCEMIA: ICD-10-CM

## 2024-04-07 DIAGNOSIS — I48.91 UNSPECIFIED ATRIAL FIBRILLATION: ICD-10-CM

## 2024-04-07 DIAGNOSIS — Z95.0 PRESENCE OF CARDIAC PACEMAKER: ICD-10-CM

## 2024-04-07 DIAGNOSIS — E11.65 TYPE 2 DIABETES MELLITUS WITH HYPERGLYCEMIA: ICD-10-CM

## 2024-04-07 DIAGNOSIS — I25.10 ATHEROSCLEROTIC HEART DISEASE OF NATIVE CORONARY ARTERY W/OUT ANGINA PECTORIS: ICD-10-CM

## 2024-04-07 DIAGNOSIS — I12.9 TYPE 2 DIABETES MELLITUS WITH DIABETIC CHRONIC KIDNEY DISEASE: ICD-10-CM

## 2024-04-07 DIAGNOSIS — N18.31 TYPE 2 DIABETES MELLITUS WITH DIABETIC CHRONIC KIDNEY DISEASE: ICD-10-CM

## 2024-04-07 DIAGNOSIS — E11.22 TYPE 2 DIABETES MELLITUS WITH DIABETIC CHRONIC KIDNEY DISEASE: ICD-10-CM

## 2024-04-07 DIAGNOSIS — E66.3 OVERWEIGHT: ICD-10-CM

## 2024-04-07 DIAGNOSIS — F32.1 MAJOR DEPRESSIVE DISORDER, SINGLE EPISODE, MODERATE: ICD-10-CM

## 2024-04-07 PROCEDURE — G0444 DEPRESSION SCREEN ANNUAL: CPT | Mod: 93,59

## 2024-04-07 PROCEDURE — 99344 HOME/RES VST NEW MOD MDM 60: CPT | Mod: 25

## 2024-04-07 PROCEDURE — G0447 BEHAVIOR COUNSEL OBESITY 15M: CPT | Mod: 93,59

## 2024-04-07 PROCEDURE — 98962 EDU&TRN PT SLF-MGMT NQHP 5-8: CPT

## 2024-04-07 PROCEDURE — 98960 EDU&TRN PT SELF-MGMT NQHP 1: CPT

## 2024-04-07 RX ORDER — AZELASTINE HYDROCHLORIDE 0.5 MG/ML
0.05 SOLUTION/ DROPS OPHTHALMIC
Refills: 0 | Status: ACTIVE | COMMUNITY

## 2024-04-07 RX ORDER — GABAPENTIN 100 MG
100 TABLET ORAL
Refills: 0 | Status: ACTIVE | COMMUNITY

## 2024-04-07 RX ORDER — ATORVASTATIN CALCIUM 40 MG/1
40 TABLET, FILM COATED ORAL
Refills: 0 | Status: ACTIVE | COMMUNITY

## 2024-04-07 RX ORDER — HYDRALAZINE HYDROCHLORIDE 100 MG/1
100 TABLET ORAL
Refills: 0 | Status: ACTIVE | COMMUNITY

## 2024-04-07 RX ORDER — LORATADINE 10 MG/1
10 TABLET ORAL
Refills: 0 | Status: ACTIVE | COMMUNITY

## 2024-04-07 RX ORDER — LEVOTHYROXINE SODIUM 0.07 MG/1
75 TABLET ORAL
Refills: 0 | Status: ACTIVE | COMMUNITY

## 2024-04-07 RX ORDER — PANTOPRAZOLE 40 MG/1
40 TABLET, DELAYED RELEASE ORAL
Refills: 0 | Status: ACTIVE | COMMUNITY

## 2024-04-07 RX ORDER — ERGOCALCIFEROL 1.25 MG/1
50000 CAPSULE ORAL
Refills: 0 | Status: ACTIVE | COMMUNITY

## 2024-04-07 RX ORDER — REPAGLINIDE 1 MG/1
1 TABLET ORAL
Refills: 0 | Status: ACTIVE | COMMUNITY

## 2024-04-07 RX ORDER — METRONIDAZOLE 250 MG/1
250 TABLET ORAL
Qty: 3 | Refills: 0 | Status: COMPLETED | COMMUNITY
Start: 2022-05-09 | End: 2024-04-07

## 2024-04-07 RX ORDER — AMLODIPINE BESYLATE 10 MG/1
10 TABLET ORAL
Refills: 0 | Status: ACTIVE | COMMUNITY

## 2024-04-07 RX ORDER — APIXABAN 5 MG/1
TABLET, FILM COATED ORAL
Refills: 0 | Status: ACTIVE | COMMUNITY

## 2024-04-07 RX ORDER — ISOSORBIDE MONONITRATE 60 MG/1
60 TABLET, EXTENDED RELEASE ORAL
Refills: 0 | Status: ACTIVE | COMMUNITY

## 2024-04-07 RX ORDER — MULTIVIT/IRON SULF/FOLIC ACID 15MG-0.4MG
TABLET ORAL
Refills: 0 | Status: ACTIVE | COMMUNITY

## 2024-04-07 RX ORDER — METOPROLOL SUCCINATE 100 MG/1
100 TABLET, EXTENDED RELEASE ORAL
Refills: 0 | Status: ACTIVE | COMMUNITY

## 2024-04-07 RX ORDER — LOSARTAN POTASSIUM 50 MG/1
50 TABLET, FILM COATED ORAL
Refills: 0 | Status: ACTIVE | COMMUNITY

## 2024-04-07 RX ORDER — INSULIN DETEMIR 100 [IU]/ML
100 INJECTION, SOLUTION SUBCUTANEOUS
Refills: 0 | Status: ACTIVE | COMMUNITY

## 2024-04-07 RX ORDER — RIVAROXABAN 15 MG/1
15 TABLET, FILM COATED ORAL
Refills: 0 | Status: ACTIVE | COMMUNITY

## 2024-04-07 NOTE — PHYSICAL EXAM
[Normal Sclera/Conjunctiva] : normal sclera/conjunctiva [Normal Outer Ear/Nose] : the outer ears and nose were normal in appearance [Supple] : supple [No Respiratory Distress] : no respiratory distress  [No Accessory Muscle Use] : no accessory muscle use [Soft] : abdomen soft [Non Tender] : non-tender [Non-distended] : non-distended [No Masses] : no abdominal mass palpated [Speech Grossly Normal] : speech grossly normal [Memory Grossly Normal] : memory grossly normal [Alert and Oriented x3] : oriented to person, place, and time [Normal Mood] : the mood was normal [Normal] : affect was normal and insight and judgment were intact [de-identified] : w/ glasses [de-identified] : lbp [de-identified] : knee pain

## 2024-04-07 NOTE — HEALTH RISK ASSESSMENT
[Poor] : ~his/her~ mood as  poor [No falls in past year] : Patient reported no falls in the past year [No] : No [Assistive Device] : Patient uses an assistive device [Little interest or pleasure doing things] : 1) Little interest or pleasure doing things [Feeling down, depressed, or hopeless] : 2) Feeling down, depressed, or hopeless [3] : 2) Feeling down, depressed, or hopeless for nearly every day (3) [PHQ-2 Positive] : PHQ-2 Positive [Nearly Every Day (3)] : 3.) Trouble falling asleep, or sleeping too much? Nearly every day [1/2 of Days or More (2)] : 6.) Feeling bad about yourself, or that you are a failure, or have let yourself or your family down? Half the days or more [Not at All (0)] : 9.) Thoughts that you would be off dead or of hurting yourself in some way? Not at all [Moderate] : Severity of Depression is Moderate [PHQ-9 Positive] : PHQ-9 Positive [I have developed a follow-up plan documented below in the note.] : I have developed a follow-up plan documented below in the note. [With Family] : lives with family [On disability] : on disability [] :  [# Of Children ___] : has [unfilled] children [Feels Safe at Home] : Feels safe at home [Reports normal functional visual acuity (ie: able to read med bottle)] : Reports normal functional visual acuity [Smoke Detector] : smoke detector [Carbon Monoxide Detector] : carbon monoxide detector [With Patient/Caregiver] : , with patient/caregiver [Designated Healthcare Proxy] : Designated healthcare proxy [Name: ___] : Health Care Proxy's Name: [unfilled]  [Relationship: ___] : Relationship: [unfilled] [I will adhere to the patient's wishes.] : I will adhere to the patient's wishes. [Time Spent: ___ minutes] : Time Spent: [unfilled] minutes [Former] : Former [20 or more] : 20 or more [> 15 Years] : > 15 Years [Audit-CScore] : 0 [de-identified] : walker and cane [NOZ7Dqlcy] : 6 [[id number]] : 13 [Change in mental status noted] : No change in mental status noted [Language] : denies difficulty with language [Behavior] : denies difficulty with behavior [Learning/Retaining New Information] : denies difficulty learning/retaining new information [Handling Complex Tasks] : denies difficulty handling complex tasks [Reasoning] : denies difficulty with reasoning [Spatial Ability and Orientation] : denies difficulty with spatial ability and orientation [Reports changes in hearing] : Reports no changes in hearing [Reports changes in vision] : Reports no changes in vision [Reports changes in dental health] : Reports no changes in dental health [de-identified] : w/ assist from [facility] [de-identified] : w/ assist from [facility] [de-identified] : w/ glasses [AdvancecareDate] : 04/24 [FreeTextEntry4] : hcp completed, content not disclosed. [de-identified] : smoked cigarette for 25 years

## 2024-04-07 NOTE — COUNSELING
[Fall prevention counseling provided] : Fall prevention counseling provided [Adequate lighting] : Adequate lighting [No throw rugs] : No throw rugs [Use proper foot wear] : Use proper foot wear [Use recommended devices] : Use recommended devices [Behavioral health counseling provided] : Behavioral health counseling provided [Sleep ___ hours/day] : Sleep [unfilled] hours/day [Engage in a relaxing activity] : Engage in a relaxing activity [Plan in advance] : Plan in advance [No] : Risk of tobacco use and health benefits of smoking cessation discussed: No [Potential consequences of obesity discussed] : Potential consequences of obesity discussed [Benefits of weight loss discussed] : Benefits of weight loss discussed [Encouraged to maintain food diary] : Encouraged to maintain food diary [Encouraged to increase physical activity] : Encouraged to increase physical activity [Encouraged to use exercise tracking device] : Encouraged to use exercise tracking device [Weigh Self Weekly] : weigh self weekly [Decrease Portions] : decrease portions [Keep Food Diary] : keep food diary [Good understanding] : Patient has a good understanding of disease, goals and obesity follow-up plan [FreeTextEntry1] : assist device w/cane and walker use [FreeTextEntry4] : 15

## 2024-04-07 NOTE — PLAN
[FreeTextEntry1] : Patient seen in home, enforced w/ pt the need for daily weight/bp monitoring/blood glucose monitoring, low salt diet and educated pt to avoid processed/canned food and limit take out, increase vegetable/fiber and fruit intake (portion control).  Daily exercise w/ easy to reach realistic goals.  Continue w/ current regimen and medication as ordered.  Adhere to follow up w/ pcp/endo/opth/nephro/dpm and referral to psychiatry and  to be provided.  Pt advised to call with any questions/concerns.  Discussed POCT monitoring with pt >10 mins.  Depression screening time spent >15 mins.

## 2024-04-07 NOTE — HISTORY OF PRESENT ILLNESS
[Family Member] : family member [FreeTextEntry1] : This visit was provided via telehealth using real-time 2-way audio visual technology. The patient, MALISSA CHAPARRO was located at home, [address] [place] [address], at the time of the visit.   The provider, Nicho ESCALANTE, was located at [facility] at the time of the visit.  The patient, MALISSA CHAPARRO and provider participated in the telehealth encounter.  Verbal consent for telehealth services was given at time prior to the start of visit. [de-identified] : HFI. This is MALISSA CHAPARRO 78 year English, F, presented for virtual visit as stated above.  Recently reported vitals in flow sheet. Medication reconciliation performed.  The patient's daughter is assisting and reported h/o: hx of colon CA s/p colectomy and CT (12/2023 completion of chemo), s/p left mastectomy secondary to Left breast CA (2004), CAD, afib on Xarelto, pacemaker Left chest, HLD, HTN, DM II, hypothyroidism, OA, and chronic LBP. POCT 193 BMI: 27.28 PCP: Oscar Adair MALISSA CHAPARRO, is seen via telecommunication as stated above, appears pleasant and in nad.  Daughter is present. Has active home care services, w/ [facility] scheduled:45hr a week total. AAO (see PE). Patient denies any feeling of depression, and HI/SI. Patient denies any f/c, sob, cp, dizziness, lightheadedness, abnormal bruising/bleeding, n/v/d, or abdominal pain.

## 2024-04-07 NOTE — REVIEW OF SYSTEMS
[Discharge] : discharge [Incontinence] : incontinence [Joint Pain] : joint pain [Joint Stiffness] : joint stiffness [Back Pain] : back pain [Depression] : depression [Negative] : Psychiatric [FreeTextEntry8] : w/ pull up [FreeTextEntry3] : w/ glasses [FreeTextEntry9] : bilateral knee pain

## 2024-04-10 NOTE — DISCHARGE NOTE NURSING/CASE MANAGEMENT/SOCIAL WORK - NSDCPEXARELTODIET_GEN_ALL_CORE
Eat healthy foods you enjoy. Rivaroxaban/Xarelto DOES NOT have a special diet. Limit your alcohol intake. None

## 2024-05-15 ENCOUNTER — APPOINTMENT (OUTPATIENT)
Dept: SURGICAL ONCOLOGY | Facility: CLINIC | Age: 79
End: 2024-05-15
Payer: MEDICARE

## 2024-05-15 VITALS
DIASTOLIC BLOOD PRESSURE: 65 MMHG | BODY MASS INDEX: 28.35 KG/M2 | HEART RATE: 70 BPM | HEIGHT: 63 IN | WEIGHT: 160 LBS | OXYGEN SATURATION: 96 % | SYSTOLIC BLOOD PRESSURE: 170 MMHG

## 2024-05-15 DIAGNOSIS — C18.9 MALIGNANT NEOPLASM OF COLON, UNSPECIFIED: ICD-10-CM

## 2024-05-15 PROCEDURE — 99214 OFFICE O/P EST MOD 30 MIN: CPT

## 2024-05-29 NOTE — HISTORY OF PRESENT ILLNESS
[de-identified] : Ms. CYNDIE CHAPARRO is a 77 year old female who present today a follow-up visit   She was admitted to [facility] with anemia and underwent a colonoscopy- found to have a malignant friable hepatic flexure mass.   EGD 4/14/22:  1. Rectum; polypectomy: Hyperplastic polyp  2. Colon at hepatic flexure biopsy: Invasive adenocarcinoma, moderately differentiated.  3. Gastric antrum biopsy: Gastric mucosa without significant pathology. H. pylori are not identified with cresyl violet stain.   CT Chest/abd/pelvis April 2022: A known hepatic flexure lesion recently biopsied on colonoscopy is not discretely visualized on this exam. There is thickening versus under distention at the hepatic flexure. There are no inflammatory changes or adjacent lymph nodes. No evidence for distant metastases.  Cyndie is now post-op from a laparoscopic extended right hemicolectomy on 5/27/2022.  Final pathology shows, moderately differentiated adenocarcinoma of the ascending colon, margins negative. Adenocarcinoma invades through the muscularis propria into the pericolic adipose tissue. 3/16 regional lymph nodes positive for metastatic carcinoma. (pT3 pN1a)  Re-admitted on 6/20/2022 for GI bleed post-op, s/p 2U PRBC with improvement to H&H EGD done on 6/22/2022 showed duodenal ulcer. She tolerated clears, was advanced to regular diet with instructions to not resume AC at this time due to the size of the duodenal ulcer. Recommendations to repeat EGD in 1 month, continue protonix BID.  6/29/2022- Cyndie reports persistent stomach pain, whenever she eats she has increased pain & gas, feels "like a balloon". reports regular bowel movements, continue follow-up with Dr. Mook Rollins (she states she saw him Monday ) f.u with Dr. tapia for her recent admission of UGI bleed and EGD showing Duodenal ulcer  PET/CT 11/21/22:  1. Nonspecific pattern of increased activity in bowel loops abutting the anastomotic site in the right side of the abdomen with no distinct focal abnormal activity that is concerning. No hypermetabolic nodes.  2. No evidence of locally recurrent FDG avid breast malignancy.   11/30/22- Today patient present with complaint of abdominal discomfort patient recent endoscopy revealed an ulcer. Patient recommended to follow up with Dr. Tapia    PET/CT 5/10/23 (per Dr. Rollins)- no FDG evidence of malignant disease.  5/15/24: Patient reports doing well today. No rectal bleeding, abdominal pain at this time. Denies fever, chills, N/V/D, unintentional weight loss. reports being under the impression that PET/CT was denied. Spoke with Dr. Rollins office, patient will be scheduled as PET is approved.   Dr. Oscar Adair- PCP

## 2024-05-29 NOTE — REASON FOR VISIT
[Home] : at home, [unfilled] , at the time of the visit. [Medical Office: ([facility])___] : at the medical office located in  [Verbal consent obtained from patient] : the patient, [unfilled] [Follow-Up Visit] : a follow-up visit for [FreeTextEntry2] :  laparoscopic extended right hemicolectomy on 5/27/2022 for malignant friable hepatic flexure mass

## 2024-05-29 NOTE — PHYSICAL EXAM
[Normal] : supple, no neck mass and thyroid not enlarged [Normal Neck Lymph Nodes] : normal neck lymph nodes  [Normal Supraclavicular Lymph Nodes] : normal supraclavicular lymph nodes [Normal Groin Lymph Nodes] : normal groin lymph nodes [Normal Axillary Lymph Nodes] : normal axillary lymph nodes [Normal] : oriented to person, place and time, with appropriate affect [FreeTextEntry1] : COVID-19 precautions as per [facility] policy was universally followed  [de-identified] : surgical incision healing well, no evidence of infection

## 2024-05-29 NOTE — ASSESSMENT
[FreeTextEntry1] : IMP: 77 year old female present with symptomatic anemia- with hepatic flexure adenocarcinoma  s/p laparoscopic extended right hemicolectomy on 5/27/2022 Final pathology shows, moderately differentiated adenocarcinoma of the ascending colon. Adenocarcinoma invades through the muscularis propria into the pericolic adipose tissue. 3/16 regional lymph nodes positive for metastatic carcinoma. (pT3 pN1a)  re-admitted on 6/20-6/24/2022 for GI bleed post-op, s/p 2U PRBC with improvement to H&H EGD done on 6/22/2022 showed duodenal ulcer. She tolerated clears, was advanced to regular diet with instructions to not resume AC at this time due to the size of the duodenal ulcer. Recommendations to repeat EGD in 1 month, continue protonix BID.  PET/CT 5/10/23- ALEXANDRA  PLAN:  -Pt to see  for surveillance RTO 6 months I have discussed the diagnosis, therapeutic plan and options with the patient at length. Patient expressed verbal understanding to proceed with proposed plan. All questions answered.

## 2024-05-29 NOTE — CONSULT LETTER
[Consult Letter:] : I had the pleasure of evaluating your patient, [unfilled]. [Dear  ___] : Dear  [unfilled], [Courtesy Letter:] : I had the pleasure of seeing your patient, [unfilled], in my office today. [( Thank you for referring [unfilled] for consultation for _____ )] : Thank you for referring [unfilled] for consultation for [unfilled] [Please see my note below.] : Please see my note below. [Consult Closing:] : Thank you very much for allowing me to participate in the care of this patient.  If you have any questions, please do not hesitate to contact me. [Sincerely,] : Sincerely, [DrRocio  ___] : Dr. AVELAR [DrRocio ___] : Dr. AVELAR [FreeTextEntry2] : Oscar Adair MD [FreeTextEntry3] : Catrachito Dee MD, FICS, FACS\par  Director of Surgical Oncology- [facility]\par  ,Department of Surgery\par  [facility]\par  [address]\[place]  [place]- [address]\par  \par  [address]\[place]  [place]- [address]\par  \par  [address]\[place]  [place]- [address]\par  \par  (mob) [phone]\par  (o) [phone]\par  (f) [phone]\par

## 2024-06-04 ENCOUNTER — APPOINTMENT (OUTPATIENT)
Dept: SURGICAL ONCOLOGY | Facility: CLINIC | Age: 79
End: 2024-06-04
Payer: MEDICARE

## 2024-06-04 PROCEDURE — 99214 OFFICE O/P EST MOD 30 MIN: CPT

## 2024-06-04 NOTE — ASSESSMENT
[FreeTextEntry1] : IMP: 77 year old female present with symptomatic anemia- with hepatic flexure adenocarcinoma  s/p laparoscopic extended right hemicolectomy on 5/27/2022 Final pathology shows, moderately differentiated adenocarcinoma of the ascending colon. Adenocarcinoma invades through the muscularis propria into the pericolic adipose tissue. 3/16 regional lymph nodes positive for metastatic carcinoma. (pT3 pN1a)  re-admitted on 6/20-6/24/2022 for GI bleed post-op, s/p 2U PRBC with improvement to H&H EGD done on 6/22/2022 showed duodenal ulcer. She tolerated clears, was advanced to regular diet with instructions to not resume AC at this time due to the size of the duodenal ulcer. Recommendations to repeat EGD in 1 month, continue protonix BID.  PET/CT 5/10/24- ALEXANDRA  PLAN:  - RTo 6 months  I have discussed the diagnosis, therapeutic plan and options with the patient at length. Patient expressed verbal understanding to proceed with proposed plan. All questions answered.

## 2024-06-04 NOTE — REASON FOR VISIT
[FreeTextEntry2] :  laparoscopic extended right hemicolectomy on 5/27/2022 for malignant friable hepatic flexure mass

## 2024-06-04 NOTE — CONSULT LETTER
[FreeTextEntry2] : Oscar Adair MD [FreeTextEntry3] : Catrachito Dee MD, FICS, FACS\par  Director of Surgical Oncology- [facility]\par  ,Department of Surgery\par  [facility]\par  [address]\[place]  [place]- [address]\par  \par  [address]\[place]  [place]- [address]\par  \par  [address]\[place]  [place]- [address]\par  \par  (mob) [phone]\par  (o) [phone]\par  (f) [phone]\par

## 2024-06-04 NOTE — HISTORY OF PRESENT ILLNESS
[de-identified] : Ms. CYNDIE CHAPARRO is a 77 year old female who present today a follow-up visit   She was admitted to [facility] with anemia and underwent a colonoscopy- found to have a malignant friable hepatic flexure mass.   EGD 4/14/22:  1. Rectum; polypectomy: Hyperplastic polyp  2. Colon at hepatic flexure biopsy: Invasive adenocarcinoma, moderately differentiated.  3. Gastric antrum biopsy: Gastric mucosa without significant pathology. H. pylori are not identified with cresyl violet stain.   CT Chest/abd/pelvis April 2022: A known hepatic flexure lesion recently biopsied on colonoscopy is not discretely visualized on this exam. There is thickening versus under distention at the hepatic flexure. There are no inflammatory changes or adjacent lymph nodes. No evidence for distant metastases.  Cyndie is now post-op from a laparoscopic extended right hemicolectomy on 5/27/2022.  Final pathology shows, moderately differentiated adenocarcinoma of the ascending colon, margins negative. Adenocarcinoma invades through the muscularis propria into the pericolic adipose tissue. 3/16 regional lymph nodes positive for metastatic carcinoma. (pT3 pN1a)  Re-admitted on 6/20/2022 for GI bleed post-op, s/p 2U PRBC with improvement to H&H EGD done on 6/22/2022 showed duodenal ulcer. She tolerated clears, was advanced to regular diet with instructions to not resume AC at this time due to the size of the duodenal ulcer. Recommendations to repeat EGD in 1 month, continue protonix BID.  6/29/2022- Cyndie reports persistent stomach pain, whenever she eats she has increased pain & gas, feels "like a balloon". reports regular bowel movements, continue follow-up with Dr. Mook Rollins (she states she saw him Monday ) f.u with Dr. tapia for her recent admission of UGI bleed and EGD showing Duodenal ulcer  PET/CT 11/21/22:  1. Nonspecific pattern of increased activity in bowel loops abutting the anastomotic site in the right side of the abdomen with no distinct focal abnormal activity that is concerning. No hypermetabolic nodes.  2. No evidence of locally recurrent FDG avid breast malignancy.   11/30/22- Today patient present with complaint of abdominal discomfort patient recent endoscopy revealed an ulcer. Patient recommended to follow up with Dr. Tapia    PET/CT 5/10/23 (per Dr. Rollins)- no FDG evidence of malignant disease.  5/15/24: Patient reports doing well today. No rectal bleeding, abdominal pain at this time. Denies fever, chills, N/V/D, unintentional weight loss. reports being under the impression that PET/CT was denied. Spoke with Dr. Rollins office, patient will be scheduled as PET is approved.   Dr. Oscar Adair- PCP

## 2024-09-30 DIAGNOSIS — Z78.9 OTHER SPECIFIED HEALTH STATUS: ICD-10-CM

## 2024-10-01 ENCOUNTER — APPOINTMENT (OUTPATIENT)
Dept: NEUROSURGERY | Facility: CLINIC | Age: 79
End: 2024-10-01
Payer: MEDICARE

## 2024-10-01 VITALS
DIASTOLIC BLOOD PRESSURE: 61 MMHG | OXYGEN SATURATION: 97 % | WEIGHT: 152 LBS | HEART RATE: 65 BPM | HEIGHT: 63 IN | TEMPERATURE: 97.7 F | BODY MASS INDEX: 26.93 KG/M2 | SYSTOLIC BLOOD PRESSURE: 144 MMHG

## 2024-10-01 DIAGNOSIS — S32.000G WEDGE COMPRESSION FRACTURE OF UNSPECIFIED LUMBAR VERTEBRA, SUBSEQUENT ENCOUNTER FOR FRACTURE WITH DELAYED HEALING: ICD-10-CM

## 2024-10-01 DIAGNOSIS — M47.816 SPONDYLOSIS W/OUT MYELOPATHY OR RADICULOPATHY, LUMBAR REGION: ICD-10-CM

## 2024-10-01 PROCEDURE — 99213 OFFICE O/P EST LOW 20 MIN: CPT

## 2024-10-01 NOTE — PHYSICAL EXAM
[General Appearance - Alert] : alert [General Appearance - In No Acute Distress] : in no acute distress [General Appearance - Well Nourished] : well nourished [General Appearance - Well Developed] : well developed [Oriented To Time, Place, And Person] : oriented to person, place, and time [Impaired Insight] : insight and judgment were intact [Affect] : the affect was normal [] : no respiratory distress [Exaggerated Use Of Accessory Muscles For Inspiration] : no accessory muscle use [No Spinal Tenderness] : no spinal tenderness [Nail Clubbing] : no clubbing  or cyanosis of the fingernails [Involuntary Movements] : no involuntary movements were seen

## 2024-10-06 NOTE — HISTORY OF PRESENT ILLNESS
[FreeTextEntry1] : LBP [de-identified] : 10/1/24: Mrs. Burks is a 79 y/o, F, with a hx of colon CA s/p colectomy and CT, CAD, afib on xarelto, pacemaker, HLD, HTN, DM II, hypothyroidism, and chronic LBP here to establish care as a new patient. She reports 10/10 back pain worsened by activity and walking. She has numbness and tingling of LE due to diabetic neuropathy. CT lumbar spine with chronic L1 and L3 compression fractures and lumbar spondylosis.   11/30/23: Mrs. Burks is a 79 y/o, F, with a hx of colon CA s/p colectomy and CT, CAD, afib on xarelto, pacemaker, HLD, HTN, DM II, hypothyroidism, and chronic LBP here to establish care as a new patient. She reports 10/10 back pain worsened by activity and walking. She has numbness and tingling of LE due to diabetic neuropathy. CT lumbar spine with chronic L1 and L3 compression fractures and lumbar spondylosis. She denies bowel/bladder dysfunction. She was advised conservative management with PT and EPSI to improve pain.

## 2024-10-06 NOTE — HISTORY OF PRESENT ILLNESS
[FreeTextEntry1] : LBP [de-identified] : 10/1/24: Mrs. Burks is a 77 y/o, F, with a hx of colon CA s/p colectomy and CT, CAD, afib on xarelto, pacemaker, HLD, HTN, DM II, hypothyroidism, and chronic LBP here to establish care as a new patient. She reports 10/10 back pain worsened by activity and walking. She has numbness and tingling of LE due to diabetic neuropathy. CT lumbar spine with chronic L1 and L3 compression fractures and lumbar spondylosis.   11/30/23: Mrs. Burks is a 77 y/o, F, with a hx of colon CA s/p colectomy and CT, CAD, afib on xarelto, pacemaker, HLD, HTN, DM II, hypothyroidism, and chronic LBP here to establish care as a new patient. She reports 10/10 back pain worsened by activity and walking. She has numbness and tingling of LE due to diabetic neuropathy. CT lumbar spine with chronic L1 and L3 compression fractures and lumbar spondylosis. She denies bowel/bladder dysfunction. She was advised conservative management with PT and EPSI to improve pain.

## 2024-10-06 NOTE — HISTORY OF PRESENT ILLNESS
[FreeTextEntry1] : LBP [de-identified] : 10/1/24: Mrs. Burks is a 77 y/o, F, with a hx of colon CA s/p colectomy and CT, CAD, afib on xarelto, pacemaker, HLD, HTN, DM II, hypothyroidism, and chronic LBP here to establish care as a new patient. She reports 10/10 back pain worsened by activity and walking. She has numbness and tingling of LE due to diabetic neuropathy. CT lumbar spine with chronic L1 and L3 compression fractures and lumbar spondylosis.   11/30/23: Mrs. Burks is a 77 y/o, F, with a hx of colon CA s/p colectomy and CT, CAD, afib on xarelto, pacemaker, HLD, HTN, DM II, hypothyroidism, and chronic LBP here to establish care as a new patient. She reports 10/10 back pain worsened by activity and walking. She has numbness and tingling of LE due to diabetic neuropathy. CT lumbar spine with chronic L1 and L3 compression fractures and lumbar spondylosis. She denies bowel/bladder dysfunction. She was advised conservative management with PT and EPSI to improve pain.

## 2024-10-06 NOTE — ASSESSMENT
[FreeTextEntry1] : Mrs. Burks is a 80 y/o, F, w/ severe low back pain, osteoporosis, afib with pacemaker on xarelto, and chronic L1 and L3 compression fractures w/ lumbar spondylosis  Plan: - MRI Lumbar spine - Follow up after MRI

## 2024-10-06 NOTE — HISTORY OF PRESENT ILLNESS
[FreeTextEntry1] : LBP [de-identified] : 10/1/24: Mrs. Burks is a 77 y/o, F, with a hx of colon CA s/p colectomy and CT, CAD, afib on xarelto, pacemaker, HLD, HTN, DM II, hypothyroidism, and chronic LBP here to establish care as a new patient. She reports 10/10 back pain worsened by activity and walking. She has numbness and tingling of LE due to diabetic neuropathy. CT lumbar spine with chronic L1 and L3 compression fractures and lumbar spondylosis.   11/30/23: Mrs. Burks is a 77 y/o, F, with a hx of colon CA s/p colectomy and CT, CAD, afib on xarelto, pacemaker, HLD, HTN, DM II, hypothyroidism, and chronic LBP here to establish care as a new patient. She reports 10/10 back pain worsened by activity and walking. She has numbness and tingling of LE due to diabetic neuropathy. CT lumbar spine with chronic L1 and L3 compression fractures and lumbar spondylosis. She denies bowel/bladder dysfunction. She was advised conservative management with PT and EPSI to improve pain.

## 2024-11-19 ENCOUNTER — APPOINTMENT (OUTPATIENT)
Dept: SURGICAL ONCOLOGY | Facility: CLINIC | Age: 79
End: 2024-11-19
Payer: MEDICARE

## 2024-11-19 DIAGNOSIS — C18.9 MALIGNANT NEOPLASM OF COLON, UNSPECIFIED: ICD-10-CM

## 2024-11-26 ENCOUNTER — APPOINTMENT (OUTPATIENT)
Dept: SURGICAL ONCOLOGY | Facility: CLINIC | Age: 79
End: 2024-11-26
Payer: MEDICARE

## 2024-11-26 VITALS
WEIGHT: 157 LBS | OXYGEN SATURATION: 95 % | HEIGHT: 63 IN | HEART RATE: 77 BPM | BODY MASS INDEX: 27.82 KG/M2 | SYSTOLIC BLOOD PRESSURE: 124 MMHG | DIASTOLIC BLOOD PRESSURE: 70 MMHG

## 2024-11-26 PROCEDURE — 99214 OFFICE O/P EST MOD 30 MIN: CPT

## 2024-12-05 NOTE — PHYSICAL THERAPY INITIAL EVALUATION ADULT - LEVEL OF INDEPENDENCE, REHAB EVAL
Goal Outcome Evaluation:  Plan of Care Reviewed With: patient           Outcome Evaluation: Pt seen today for OT with a focus on ADL participation in preparation for discharge home. She states she has been staying at her son's house since her recent back sx. Today she performed bed mobility with supervision (log roll technique). Stood with SBA/supervision and performed mobility into the bathroom particpating in toileting, grooming seated at the sink and showering. Required SBA/CGA for all showering. Discussed safety with showers at home when able. Pt states her son has a walk in shower with a seat. At her house she has a tub/shower combo, discussed use of a tub bench for safety with transfers and energy conservation. Able to doff/don her R sock, needed assist to don the L sock. Discussed use of reacher to assist with doffing L sock and sock aide to don. pt wore her L knee brace/sleeve with all mobility today. She sat in front of the sink for all grooming with SBA. Pt reports she plans to discharge home to her son's house.    Anticipated Discharge Disposition (OT): home with outpatient therapy services                         pt preferred to be OOB in a  chair

## 2024-12-17 ENCOUNTER — APPOINTMENT (OUTPATIENT)
Dept: NEUROSURGERY | Facility: CLINIC | Age: 79
End: 2024-12-17

## 2024-12-17 VITALS
OXYGEN SATURATION: 97 % | HEART RATE: 71 BPM | WEIGHT: 157 LBS | BODY MASS INDEX: 27.82 KG/M2 | SYSTOLIC BLOOD PRESSURE: 151 MMHG | DIASTOLIC BLOOD PRESSURE: 61 MMHG | TEMPERATURE: 97.1 F | HEIGHT: 63 IN

## 2024-12-17 DIAGNOSIS — S32.000G WEDGE COMPRESSION FRACTURE OF UNSPECIFIED LUMBAR VERTEBRA, SUBSEQUENT ENCOUNTER FOR FRACTURE WITH DELAYED HEALING: ICD-10-CM

## 2024-12-17 DIAGNOSIS — M54.9 DORSALGIA, UNSPECIFIED: ICD-10-CM

## 2024-12-17 DIAGNOSIS — M47.816 SPONDYLOSIS W/OUT MYELOPATHY OR RADICULOPATHY, LUMBAR REGION: ICD-10-CM

## 2024-12-17 PROCEDURE — 99213 OFFICE O/P EST LOW 20 MIN: CPT

## 2025-05-27 ENCOUNTER — APPOINTMENT (OUTPATIENT)
Dept: SURGICAL ONCOLOGY | Facility: CLINIC | Age: 80
End: 2025-05-27
Payer: MEDICARE

## 2025-05-27 PROCEDURE — 99212 OFFICE O/P EST SF 10 MIN: CPT | Mod: 93

## 2025-06-10 ENCOUNTER — APPOINTMENT (OUTPATIENT)
Dept: SURGERY | Facility: CLINIC | Age: 80
End: 2025-06-10
Payer: COMMERCIAL

## 2025-06-10 PROCEDURE — 99203 OFFICE O/P NEW LOW 30 MIN: CPT

## 2025-06-18 PROBLEM — K43.0 INCARCERATED INCISIONAL HERNIA: Status: ACTIVE | Noted: 2025-06-18

## 2025-07-16 ENCOUNTER — OUTPATIENT (OUTPATIENT)
Dept: OUTPATIENT SERVICES | Facility: HOSPITAL | Age: 80
LOS: 1 days | End: 2025-07-16
Payer: MEDICARE

## 2025-07-16 VITALS
RESPIRATION RATE: 18 BRPM | HEIGHT: 63 IN | OXYGEN SATURATION: 97 % | WEIGHT: 166.01 LBS | TEMPERATURE: 98 F | DIASTOLIC BLOOD PRESSURE: 62 MMHG | HEART RATE: 78 BPM | SYSTOLIC BLOOD PRESSURE: 152 MMHG

## 2025-07-16 DIAGNOSIS — Z91.89 OTHER SPECIFIED PERSONAL RISK FACTORS, NOT ELSEWHERE CLASSIFIED: ICD-10-CM

## 2025-07-16 DIAGNOSIS — E03.9 HYPOTHYROIDISM, UNSPECIFIED: ICD-10-CM

## 2025-07-16 DIAGNOSIS — Z01.818 ENCOUNTER FOR OTHER PREPROCEDURAL EXAMINATION: ICD-10-CM

## 2025-07-16 DIAGNOSIS — E78.5 HYPERLIPIDEMIA, UNSPECIFIED: ICD-10-CM

## 2025-07-16 DIAGNOSIS — I25.10 ATHEROSCLEROTIC HEART DISEASE OF NATIVE CORONARY ARTERY WITHOUT ANGINA PECTORIS: ICD-10-CM

## 2025-07-16 DIAGNOSIS — Z95.0 PRESENCE OF CARDIAC PACEMAKER: Chronic | ICD-10-CM

## 2025-07-16 DIAGNOSIS — K43.0 INCISIONAL HERNIA WITH OBSTRUCTION, WITHOUT GANGRENE: ICD-10-CM

## 2025-07-16 DIAGNOSIS — Z90.49 ACQUIRED ABSENCE OF OTHER SPECIFIED PARTS OF DIGESTIVE TRACT: Chronic | ICD-10-CM

## 2025-07-16 DIAGNOSIS — E11.9 TYPE 2 DIABETES MELLITUS WITHOUT COMPLICATIONS: ICD-10-CM

## 2025-07-16 DIAGNOSIS — Z98.890 OTHER SPECIFIED POSTPROCEDURAL STATES: Chronic | ICD-10-CM

## 2025-07-16 DIAGNOSIS — H91.93 UNSPECIFIED HEARING LOSS, BILATERAL: ICD-10-CM

## 2025-07-16 DIAGNOSIS — Z95.1 PRESENCE OF AORTOCORONARY BYPASS GRAFT: Chronic | ICD-10-CM

## 2025-07-16 DIAGNOSIS — N18.9 CHRONIC KIDNEY DISEASE, UNSPECIFIED: ICD-10-CM

## 2025-07-16 DIAGNOSIS — Z90.12 ACQUIRED ABSENCE OF LEFT BREAST AND NIPPLE: Chronic | ICD-10-CM

## 2025-07-16 DIAGNOSIS — K21.9 GASTRO-ESOPHAGEAL REFLUX DISEASE WITHOUT ESOPHAGITIS: ICD-10-CM

## 2025-07-16 DIAGNOSIS — I10 ESSENTIAL (PRIMARY) HYPERTENSION: ICD-10-CM

## 2025-07-16 RX ORDER — FENOFIBRATE 160 MG/1
1 TABLET ORAL
Refills: 0 | DISCHARGE

## 2025-07-16 RX ORDER — FUROSEMIDE 10 MG/ML
1 INJECTION INTRAMUSCULAR; INTRAVENOUS
Refills: 0 | DISCHARGE

## 2025-07-16 RX ORDER — SITAGLIPTIN 100 MG/1
1 TABLET, FILM COATED ORAL
Refills: 0 | DISCHARGE

## 2025-07-16 RX ORDER — PREGABALIN 50 MG/1
1 CAPSULE ORAL
Refills: 0 | DISCHARGE

## 2025-07-16 RX ORDER — RIVAROXABAN 10 MG/1
1 TABLET, FILM COATED ORAL
Refills: 0 | DISCHARGE

## 2025-07-16 RX ORDER — ISOSORBIDE MONONITRATE 60 MG/1
1 TABLET, EXTENDED RELEASE ORAL
Refills: 0 | DISCHARGE

## 2025-07-16 RX ORDER — INSULIN DETEMIR 100 [IU]/ML
0 INJECTION, SOLUTION SUBCUTANEOUS
Refills: 0 | DISCHARGE

## 2025-07-16 RX ORDER — DAPAGLIFLOZIN 5 MG/1
1 TABLET, FILM COATED ORAL
Refills: 0 | DISCHARGE

## 2025-07-16 RX ORDER — DONEPEZIL HYDROCHLORIDE 5 MG/1
1 TABLET ORAL
Refills: 0 | DISCHARGE

## 2025-07-16 RX ORDER — INSULIN DETEMIR 100 [IU]/ML
30 INJECTION, SOLUTION SUBCUTANEOUS
Refills: 0 | DISCHARGE

## 2025-07-16 RX ORDER — ISOSORBIDE MONONITRATE 60 MG/1
60 TABLET, EXTENDED RELEASE ORAL
Refills: 0 | DISCHARGE

## 2025-07-16 RX ORDER — REPAGLINIDE 1 MG/1
1 TABLET ORAL
Refills: 0 | DISCHARGE

## 2025-07-16 RX ORDER — B1/B2/B3/B5/B6/B12/VIT C/FOLIC 500-0.5 MG
1 TABLET ORAL
Refills: 0 | DISCHARGE

## 2025-07-16 RX ORDER — B1/B2/B3/B5/B6/B12/VIT C/FOLIC 500-0.5 MG
TABLET ORAL
Refills: 0 | DISCHARGE

## 2025-07-16 RX ORDER — AMLODIPINE BESYLATE 10 MG/1
1 TABLET ORAL
Refills: 0 | DISCHARGE

## 2025-07-16 RX ORDER — LEVOTHYROXINE SODIUM 300 MCG
1 TABLET ORAL
Refills: 0 | DISCHARGE

## 2025-07-16 NOTE — H&P PST ADULT - NSICDXPROCEDURE_GEN_ALL_CORE_FT
PROCEDURES:  Robot-assisted repair of hernia of abdominal wall using component separation technique 16-Jul-2025 16:24:33  Alba Gonsalez

## 2025-07-16 NOTE — H&P PST ADULT - HISTORY OF PRESENT ILLNESS
This is a  79 year old  female with PMH of CAD-s/p CABG x 1, pacemaker implantation on 7/17/2017, atrial fibrillation, HTN, HLD, colon cancer-s/p colectomy who presents with c/o intermittent abdominal pain due to incisional hernia. Pt for ventral hernia repair on  This is a  79 year old  female with PMH of CAD-s/p CABG x 1, pacemaker implantation on 7/17/2017, atrial fibrillation on Xarelto, HTN, HLD, cleft breast cancer-s/p left mastectomy, colon cancer-s/p right colectomy who presents with c/o intermittent abdominal pain due to incisional hernia. Pt for incarcerated incisional ventral hernia repair on  This is a  79 year old  female with PMH of CAD-s/p CABG x 1, [employer] pacemaker implantation on 7/17/2017, atrial fibrillation on Xarelto, diabetes on Farxiga, Januvia and Repaglinide-HgA1C unknown, CKD-followed by nephrology, HTN, HLD, Hypothyroidism, GERD, allergic rhinitis, hearing deficit bilaterally-wears hearing aids, left breast cancer-s/p left mastectomy, colon cancer-s/p right colectomy who presents with c/o intermittent abdominal pain due to incisional hernia. Pt for laparoscopic incarcerated incisional hernia repair possible open on 7/23/2025.

## 2025-07-16 NOTE — H&P PST ADULT - PROBLEM SELECTOR PLAN 1
Pt for laparoscopic incarcerated incisional hernia repair possible open on 7/23/2025.  IVONNE stop bang score 5. Pt denies history of IVONNE and sleep study. Pt is at high risk for IVONNE.  Preoperative instructions discussed with pt via Macedonian speaking daughter Shira. Turkmen copy of instructions given to pt.   Instructed not to eat or drink anything after midnight the night before the surgery, to avoid NSAIDS such as Ibuprofen, Motrin, aleve, Advil, naproxen before surgery, to take Tylenol if needed for pain, to report if she has been exposed to any one with any contagious diseases including Covid-19 or if she is exhibiting any symptoms of COVID-19.   Instructed about use of Chlorhexidine 4% soap 3 days before surgery including the morning of surgery. Verbalized understanding of instructions given.

## 2025-07-16 NOTE — H&P PST ADULT - PROBLEM SELECTOR PLAN 3
Pt on Farxiga, Januvia and Repaglinide-HgA1C unknown-will obtain from PCP.  Instructed to continue antidiabetic meds and to hold Farxiga 3 days before surgery to prevent euglycemic hyperglycemia, hold the other 2 meds on the morning of surgery.   Perioperative glucose monitoring and coverage as needed. Follow-up with PCP for diabetic management

## 2025-07-16 NOTE — H&P PST ADULT - PRO TOBACCO TYPE
Patient would like to make an in person visit for SPT.  Patient states that she is still having some \"chest tightness\" but denies shortness of breath.  Patient states she is not in distress.  Advised patient that if she has any symptoms, physician would likely not order skin prick testing and recommended that patient be seen by Dr. Rincon virtually first.  Patient adamantly refused video visit and stated that she would only wants a telephone visit.  Notified patient that a video visit is preferred, but will book appointment for phone visit and if Dr. Rincon would prefer to see her in person, will call and reschedule her.    SUSANNA Rincon   cigarettes

## 2025-07-16 NOTE — H&P PST ADULT - OTHER CARE PROVIDERS
Dr York  [phone]; Dr Rosa Maria Tobin [phone] Dr York  [phone]; Dr [phone]

## 2025-07-16 NOTE — H&P PST ADULT - PROBLEM SELECTOR PLAN 11
Caprini score 9 as per today's assessment.  Caprini Score Greater than or =7: High risk, pharmacologic VTE prophylaxis indicated for most patients (in the absence of contraindications)

## 2025-07-16 NOTE — H&P PST ADULT - PROBLEM SELECTOR PLAN 4
Instructed to continue antihypertensive meds and take them with sips of water on day of surgery. Follow-up with PCP for management.

## 2025-07-16 NOTE — H&P PST ADULT - ASSESSMENT
This is a  79 year old  female with PMH of CAD-s/p CABG x 1, [employer]tr[employer] pacemaker implantation on 2017, atrial fibrillation on Xarelto, diabetes on Farxiga, Januvia and Repaglinide-HgA1C unknown, CKD-followed by nephrology, HTN, HLD, Hypothyroidism, GERD, allergic rhinitis, hearing deficit bilaterally-wears hearing aids, left breast cancer-s/p left mastectomy, colon cancer-s/p right colectomy who presents with incisional hernia with obstruction without gangrene. Pt for laparoscopic incarcerated incisional hernia repair possible open on 2025.  IVONNE stop bang score 5. Pt denies history of IVONNE and sleep study. Pt is at high risk for IVONNE.    CAPRINI SCORE  9    AGE RELATED RISK FACTORS                                                             [ ] Age 41-60 years                                            (1 Point)  [ ] Age: 61-74 years                                           (2 Points)                 [ X] Age= 75 years                                                (3 Points)             DISEASE RELATED RISK FACTORS                                                       [ ] Edema in the lower extremities                 (1 Point)                     [ ] Varicose veins                                               (1 Point)                                 [ X] BMI > 25 Kg/m2                                            (1 Point)                                  [ ] Serious infection (ie PNA)                            (1 Point)                     [ ] Lung disease ( COPD, Emphysema)            (1 Point)                                                                          [ ] Acute myocardial infarction                         (1 Point)                  [ ] Congestive heart failure (in the previous month)  (1 Point)         [ ] Inflammatory bowel disease                            (1 Point)                  [ ] Central venous access, PICC or Port               (2 points)       (within the last month)                                                                [ ] Stroke (in the previous month)                        (5 Points)    X[ ] Previous or present malignancy                       (2 points)                                                                                                                                                         HEMATOLOGY RELATED FACTORS                                                         [ ] Prior episodes of VTE                                     (3 Points)                     [ ] Positive family history for VTE                      (3 Points)                  [ ] Prothrombin 79899 A                                     (3 Points)                     [ ] Factor V Leiden                                                (3 Points)                        [ ] Lupus anticoagulants                                      (3 Points)                                                           [ ] Anticardiolipin antibodies                              (3 Points)                                                       [ ] High homocysteine in the blood                   (3 Points)                                             [ ] Other congenital or acquired thrombophilia      (3 Points)                                                [ ] Heparin induced thrombocytopenia                  (3 Points)                                        MOBILITY RELATED FACTORS  [ ] Bed rest                                                         (1 Point)  [ ] Plaster cast                                                    (2 points)  [ ] Bed bound for more than 72 hours           (2 Points)    GENDER SPECIFIC FACTORS  [ ] Pregnancy or had a baby within the last month   (1 Point)  [ ] Post-partum < 6 weeks                                   (1 Point)  [ ] Hormonal therapy  or oral contraception   (1 Point)  [ ] History of pregnancy complications              (1 point)  [ ] Unexplained or recurrent              (1 Point)    OTHER RISK FACTORS                                           (1 Point)  [ X] BMI >40, smoking, diabetes requiring insulin, chemotherapy  blood transfusions and length of surgery over 2 hours    SURGERY RELATED RISK FACTORS  [ ]  Section within the last month     (1 Point)  [ ] Minor surgery                                                  (1 Point)  [ ] Arthroscopic surgery                                       (2 Points)  [X ] Planned major surgery lasting more            (2 Points)      than 45 minutes     [ ] Elective hip or knee joint replacement       (5 points)       surgery                                                TRAUMA RELATED RISK FACTORS  [ ] Fracture of the hip, pelvis, or leg                       (5 Points)  [ ] Spinal cord injury resulting in paralysis             (5 points)       (in the previous month)    [ ] Paralysis  (less than 1 month)                             (5 Points)  [ ] Multiple Trauma within 1 month                        (5 Points)    Total Score [  9  ]    Caprini Score 0-2: Low Risk, mechanical prophylaxis (ie:compression devices)  Caprini Score 3-6: Moderate Risk , pharmacologic VTE prophylaxis is indicated for most patients (in the absence of contraindications)  Caprini Score Greater than or =7: High risk, pharmocologic VTE prophylaxis indicated for most patients (in the absence of contraindications)

## 2025-07-16 NOTE — H&P PST ADULT - PROBLEM SELECTOR PLAN 2
CAD-s/p CABG x 1, Medtronic pacemaker implantation on 7/17/2017, atrial fibrillation on Xarelto.  Pacemaker to get interrogated tomorrow on 7/17/25.  Advised to follow cardiologist's advice regarding Xarelto management perioperatively.

## 2025-07-16 NOTE — H&P PST ADULT - PROBLEM SELECTOR PLAN 5
Pt with CKD-followed by nephrology.  Pt to follow-up with provider for management.  Nephrotoxic agents to be avoided perioperatively.

## 2025-07-16 NOTE — H&P PST ADULT - PROBLEM SELECTOR PLAN 6
Instructed to continue levothyroxine and take it with sips of water on day of surgery. Follow-up with provider for management.

## 2025-07-16 NOTE — H&P PST ADULT - NS MD [employer] INPLANTS MED DEV
Pacemaker Medtronic  implanted 7/17/2027 Serial Number [id number] Model Number [id number]; CABGx 1;/Lens implant/Pacemaker/Vascular stents/Clips

## 2025-07-17 PROCEDURE — G0463: CPT

## 2025-07-23 ENCOUNTER — APPOINTMENT (OUTPATIENT)
Dept: SURGERY | Facility: HOSPITAL | Age: 80
End: 2025-07-23

## (undated) DEVICE — GOWN SLEEVES

## (undated) DEVICE — MASK OXYGEN PANORAMIC

## (undated) DEVICE — DRSG CURITY GAUZE SPONGE 4 X 4" 12-PLY

## (undated) DEVICE — SUCTION YANKAUER OPEN TIP NO VENT CURVE

## (undated) DEVICE — DRSG TELFA 3 X 8

## (undated) DEVICE — SUCTION POOLE TIP

## (undated) DEVICE — SYR LUER LOK 10CC

## (undated) DEVICE — DRAPE INSTRUMENT POUCH

## (undated) DEVICE — LUBRICATING JELLY ONESHOT 1.25OZ

## (undated) DEVICE — Device

## (undated) DEVICE — TUBING STRYKER PNEUMOSURE HI FLOW INSUFFLATOR

## (undated) DEVICE — TUBING CANNULA SALTER LABS NASAL ADULT 7FT

## (undated) DEVICE — SENSOR O2 FINGER ADULT 24/CA

## (undated) DEVICE — RADIAL JAW 4 240CM WITH NDL

## (undated) DEVICE — BITE BLOCK MOUTHPCW/STRAP

## (undated) DEVICE — SUT SOFSILK 2-0 18" TIES

## (undated) DEVICE — SOLIDIFIER ISOLYZER 2000 CC

## (undated) DEVICE — APPLICATOR VISTASEAL LAP DUAL 35CM RIGID

## (undated) DEVICE — STAPLER SKIN VISI-STAT 35 WIDE

## (undated) DEVICE — BIOPSY FORCEP RADIAL JAW 4 STANDARD WITH NEEDLE

## (undated) DEVICE — ASSEMBLY BULB AND BLADR STER DISP

## (undated) DEVICE — TUBE RECTAL 24FR

## (undated) DEVICE — ELCTR EDGE BOVIE INSULATED BLADE TIP 6.5"

## (undated) DEVICE — DRSG GAUZE 4X4"

## (undated) DEVICE — SUT POLYSORB 2-0 36" GS-21 UNDYED

## (undated) DEVICE — FOR-ESU VALLEYLAB T7E15009DX: Type: DURABLE MEDICAL EQUIPMENT

## (undated) DEVICE — SUT POLYSORB 0 30" GU-46

## (undated) DEVICE — SUT MAXON 0 36" T-12

## (undated) DEVICE — DRSG 4X4

## (undated) DEVICE — PACK GENERAL LAPAROSCOPY

## (undated) DEVICE — SOL INJ NS 0.9% 500ML 1-PORT

## (undated) DEVICE — DRAIN BLAKE #19

## (undated) DEVICE — RETRIEVER ROTH NET PLATINUM-UNIVERSAL

## (undated) DEVICE — NDL INJ SCLERO INTERJECT 23G

## (undated) DEVICE — STAPLER COVIDIEN ENDO GIA STANDARD HANDLE

## (undated) DEVICE — SUT MAXON 2-0 27" T-5

## (undated) DEVICE — TUBING IV SET GRAVITY 3Y 100" MACRO

## (undated) DEVICE — TIP SCISSOR ENDO CUT

## (undated) DEVICE — FORMALIN CUPS 10% BUFFERED

## (undated) DEVICE — WRAP COMPRESSION CALF MED

## (undated) DEVICE — FOLEY TRAY 16FR 5CC LF UMETER CLOSED

## (undated) DEVICE — STAPLER COVIDIEN ENDO GIA XL HANDLE

## (undated) DEVICE — DRSG TEGADERM 2.5X3"

## (undated) DEVICE — CLAMP BX HOT RAD JAW 3

## (undated) DEVICE — SUT POLYSORB 2-0 30" V-20 UNDYED

## (undated) DEVICE — SNARE LOOP POLY DISP 30MM LOOP

## (undated) DEVICE — SUT MAXON 1 60" T-60

## (undated) DEVICE — SUT SOFSILK 3-0 18" V-20

## (undated) DEVICE — GLV 8 PROTEXIS

## (undated) DEVICE — BLADE SURGICAL #15 CARBON

## (undated) DEVICE — TUBING STRYKEFLOW II SUCTION / IRRIGATOR

## (undated) DEVICE — POSITIONER MATTRESS PAD CONVOLUTED FOAM

## (undated) DEVICE — SYR LUER LOK 50CC

## (undated) DEVICE — SUT BIOSYN 4-0 18" P-12

## (undated) DEVICE — PACK MINOR NO DRAPE

## (undated) DEVICE — SPONGE LAP PAD W RING 18X18"

## (undated) DEVICE — LIGASURE MARYLAND JAW LAPAROSCOPIC SEALER 37CM

## (undated) DEVICE — DRSG AQUACEL 3.5X6"

## (undated) DEVICE — TUBING MEDI-VAC W MAXIGRIP CONNECTORS 1/4"X6'

## (undated) DEVICE — SOL IRR BAG NS 0.9% 3000ML

## (undated) DEVICE — SYR ASEPTO

## (undated) DEVICE — FORCEP RADIAL JAW 4 LG CAPACITY 2.4MM 2.8MM 160CM YELLOW DISP

## (undated) DEVICE — LIGASURE MARYLAND JAW LAPAROSCOPIC SEALER 5MM-44CM

## (undated) DEVICE — DRAPE TOWEL BLUE 17" X 24"

## (undated) DEVICE — VALVE ENDO SURESEAL II 0-5FR

## (undated) DEVICE — TROCAR ETHICON 12MM XCEL BLADELESS

## (undated) DEVICE — GLV 7.5 PROTEXIS NEUTHERA

## (undated) DEVICE — DRAPE HALF SHEET 40X57"

## (undated) DEVICE — DRAPE LAPAROTOMY W POUCHES

## (undated) DEVICE — SUT POLYSORB 0 18" TIES UNDYED

## (undated) DEVICE — CONTAINER FORMALIN 10% 20ML

## (undated) DEVICE — TROCAR APPLIED MEDICAL BLUNT TIP 12X100MM

## (undated) DEVICE — DRSG TEGADERM 4X4.75"

## (undated) DEVICE — LUBE JELLY FOILPACK 36GM STERILE

## (undated) DEVICE — NDL HYPO SAFE 22G X 1"

## (undated) DEVICE — CATH ELCTR GLIDE PRB 7FR

## (undated) DEVICE — KIT ENDOSCOPIC HOOK TRIANGLE EGR-D 30 DEG 5-6IN

## (undated) DEVICE — BITE BLOCK SCOPE SAVER 20X27MM ADULT GREEN

## (undated) DEVICE — DRAPE LEGGINGS 6" CUFF 28X43"

## (undated) DEVICE — NDL INSUFFLATION SURGINEEDLE 120MM

## (undated) DEVICE — D HELP - CLEARVIEW CLEARIFY SYSTEM

## (undated) DEVICE — TROCAR ETHICON ENDOPATH XCEL BLADELESS 5MM-100MM

## (undated) DEVICE — PRECISION CUT SCISSOR MICROLINE MINI ENDOCUT DISP

## (undated) DEVICE — SENSOR O2 FINGER ADULT

## (undated) DEVICE — SINGLE BASIN STERILE

## (undated) DEVICE — SUT SOFSILK 0 30" TIES

## (undated) DEVICE — KIT ENDO PROCEDURE CUST W/VLV